# Patient Record
Sex: FEMALE | Race: WHITE | Employment: UNEMPLOYED | URBAN - METROPOLITAN AREA
[De-identification: names, ages, dates, MRNs, and addresses within clinical notes are randomized per-mention and may not be internally consistent; named-entity substitution may affect disease eponyms.]

---

## 2017-04-14 ENCOUNTER — ANESTHESIA EVENT (OUTPATIENT)
Dept: INTERVENTIONAL RADIOLOGY/VASCULAR | Age: 57
DRG: 004 | End: 2017-04-14
Payer: MEDICARE

## 2017-04-14 ENCOUNTER — ANESTHESIA EVENT (OUTPATIENT)
Dept: OPERATING ROOM | Age: 57
DRG: 004 | End: 2017-04-14
Payer: MEDICARE

## 2017-04-14 ENCOUNTER — APPOINTMENT (OUTPATIENT)
Dept: INTERVENTIONAL RADIOLOGY/VASCULAR | Age: 57
DRG: 004 | End: 2017-04-14
Attending: NEUROLOGICAL SURGERY
Payer: MEDICARE

## 2017-04-14 ENCOUNTER — HOSPITAL ENCOUNTER (INPATIENT)
Age: 57
LOS: 24 days | Discharge: NURSING, REHAB OR LONG-TERM CARE | DRG: 004 | End: 2017-05-08
Attending: NEUROLOGICAL SURGERY | Admitting: NEUROLOGICAL SURGERY
Payer: MEDICARE

## 2017-04-14 ENCOUNTER — APPOINTMENT (OUTPATIENT)
Dept: GENERAL RADIOLOGY | Age: 57
DRG: 004 | End: 2017-04-14
Attending: NEUROLOGICAL SURGERY
Payer: MEDICARE

## 2017-04-14 ENCOUNTER — APPOINTMENT (OUTPATIENT)
Dept: CT IMAGING | Age: 57
DRG: 004 | End: 2017-04-14
Attending: NEUROLOGICAL SURGERY
Payer: MEDICARE

## 2017-04-14 ENCOUNTER — ANESTHESIA (OUTPATIENT)
Dept: OPERATING ROOM | Age: 57
DRG: 004 | End: 2017-04-14
Payer: MEDICARE

## 2017-04-14 ENCOUNTER — ANESTHESIA (OUTPATIENT)
Dept: INTERVENTIONAL RADIOLOGY/VASCULAR | Age: 57
DRG: 004 | End: 2017-04-14
Payer: MEDICARE

## 2017-04-14 VITALS — OXYGEN SATURATION: 100 % | TEMPERATURE: 93.7 F | DIASTOLIC BLOOD PRESSURE: 62 MMHG | SYSTOLIC BLOOD PRESSURE: 100 MMHG

## 2017-04-14 DIAGNOSIS — I60.2: Primary | ICD-10-CM

## 2017-04-14 PROBLEM — I60.9 SAH (SUBARACHNOID HEMORRHAGE) (HCC): Status: ACTIVE | Noted: 2017-04-14

## 2017-04-14 PROBLEM — J96.00 ACUTE RESPIRATORY FAILURE WITHOUT HYPERCAPNIA (HCC): Status: ACTIVE | Noted: 2017-04-14

## 2017-04-14 LAB
ABSOLUTE EOS #: 0 K/UL (ref 0–0.4)
ABSOLUTE LYMPH #: 1.2 K/UL (ref 1–4.8)
ABSOLUTE MONO #: 1 K/UL (ref 0.1–1.2)
ANION GAP SERPL CALCULATED.3IONS-SCNC: 18 MMOL/L (ref 9–17)
BASOPHILS # BLD: 1 % (ref 0–2)
BASOPHILS ABSOLUTE: 0.1 K/UL (ref 0–0.2)
BUN BLDV-MCNC: 8 MG/DL (ref 6–20)
BUN/CREAT BLD: ABNORMAL (ref 9–20)
CALCIUM SERPL-MCNC: 7.8 MG/DL (ref 8.6–10.4)
CHLORIDE BLD-SCNC: 101 MMOL/L (ref 98–107)
CO2: 20 MMOL/L (ref 20–31)
COLLAGEN ADENOSINE-5'-DIPHOSPHATE (ADP) TIME: 63 SEC (ref 67–112)
COLLAGEN EPINEPHRINE TIME: 104 SEC (ref 85–172)
CREAT SERPL-MCNC: 0.4 MG/DL (ref 0.5–0.9)
DIFFERENTIAL TYPE: ABNORMAL
EOSINOPHILS RELATIVE PERCENT: 0 % (ref 1–4)
FIO2: 4
FIO2: 40
GFR AFRICAN AMERICAN: >60 ML/MIN
GFR NON-AFRICAN AMERICAN: >60 ML/MIN
GFR SERPL CREATININE-BSD FRML MDRD: ABNORMAL ML/MIN/{1.73_M2}
GFR SERPL CREATININE-BSD FRML MDRD: ABNORMAL ML/MIN/{1.73_M2}
GLUCOSE BLD-MCNC: 218 MG/DL (ref 70–99)
HCT VFR BLD CALC: 36.3 % (ref 36–46)
HCT VFR BLD CALC: 42.4 % (ref 36–46)
HEMOGLOBIN: 12.4 G/DL (ref 12–16)
HEMOGLOBIN: 14.2 G/DL (ref 12–16)
INR BLD: 1
LYMPHOCYTES # BLD: 7 % (ref 24–44)
MCH RBC QN AUTO: 31.8 PG (ref 26–34)
MCH RBC QN AUTO: 31.9 PG (ref 26–34)
MCHC RBC AUTO-ENTMCNC: 33.5 G/DL (ref 31–37)
MCHC RBC AUTO-ENTMCNC: 34.3 G/DL (ref 31–37)
MCV RBC AUTO: 92.9 FL (ref 80–100)
MCV RBC AUTO: 95.2 FL (ref 80–100)
MONOCYTES # BLD: 6 % (ref 2–11)
MRSA, DNA, NASAL: NORMAL
NEGATIVE BASE EXCESS, ART: 3 (ref 0–2)
NEGATIVE BASE EXCESS, ART: 4 (ref 0–2)
O2 DEVICE/FLOW/%: ABNORMAL
O2 DEVICE/FLOW/%: ABNORMAL
PARTIAL THROMBOPLASTIN TIME: 20.8 SEC (ref 21.3–31.3)
PATIENT TEMP: ABNORMAL
PATIENT TEMP: ABNORMAL
PDW BLD-RTO: 13.8 % (ref 12.5–15.4)
PDW BLD-RTO: 14.2 % (ref 12.5–15.4)
PLATELET # BLD: 227 K/UL (ref 140–450)
PLATELET # BLD: 317 K/UL (ref 140–450)
PLATELET ESTIMATE: ABNORMAL
PLATELET FUNCTION INTERP: ABNORMAL
PMV BLD AUTO: 7 FL (ref 6–12)
PMV BLD AUTO: 7.4 FL (ref 6–12)
POC HCO3: 22.7 MMOL/L (ref 22–27)
POC HCO3: 24.1 MMOL/L (ref 22–27)
POC O2 SATURATION: 96 %
POC O2 SATURATION: 97 %
POC PCO2 TEMP: ABNORMAL MM HG
POC PCO2 TEMP: ABNORMAL MM HG
POC PCO2: 46 MM HG (ref 32–45)
POC PCO2: 52 MM HG (ref 32–45)
POC PH TEMP: ABNORMAL
POC PH TEMP: ABNORMAL
POC PH: 7.28 (ref 7.35–7.45)
POC PH: 7.3 (ref 7.35–7.45)
POC PO2 TEMP: ABNORMAL MM HG
POC PO2 TEMP: ABNORMAL MM HG
POC PO2: 100 MM HG (ref 75–95)
POC PO2: 95 MM HG (ref 75–95)
POSITIVE BASE EXCESS, ART: ABNORMAL (ref 0–2)
POSITIVE BASE EXCESS, ART: ABNORMAL (ref 0–2)
POTASSIUM SERPL-SCNC: 3.7 MMOL/L (ref 3.7–5.3)
PROTHROMBIN TIME: 10.7 SEC (ref 9.4–12.6)
RBC # BLD: 3.91 M/UL (ref 4–5.2)
RBC # BLD: 4.45 M/UL (ref 4–5.2)
RBC # BLD: ABNORMAL 10*6/UL
SEG NEUTROPHILS: 86 % (ref 36–66)
SEGMENTED NEUTROPHILS ABSOLUTE COUNT: 15.2 K/UL (ref 1.8–7.7)
SODIUM BLD-SCNC: 139 MMOL/L (ref 135–144)
SPECIMEN DESCRIPTION: NORMAL
TCO2 (CALC), ART: 24 MM HG (ref 23–28)
TCO2 (CALC), ART: 26 MM HG (ref 23–28)
WBC # BLD: 10.2 K/UL (ref 3.5–11)
WBC # BLD: 17.5 K/UL (ref 3.5–11)
WBC # BLD: ABNORMAL 10*3/UL

## 2017-04-14 PROCEDURE — 82803 BLOOD GASES ANY COMBINATION: CPT

## 2017-04-14 PROCEDURE — 2500000003 HC RX 250 WO HCPCS: Performed by: EMERGENCY MEDICINE

## 2017-04-14 PROCEDURE — B41FYZZ FLUOROSCOPY OF RIGHT LOWER EXTREMITY ARTERIES USING OTHER CONTRAST: ICD-10-PCS | Performed by: PSYCHIATRY & NEUROLOGY

## 2017-04-14 PROCEDURE — 6360000002 HC RX W HCPCS: Performed by: EMERGENCY MEDICINE

## 2017-04-14 PROCEDURE — 94770 HC ETCO2 MONITOR DAILY: CPT

## 2017-04-14 PROCEDURE — 80048 BASIC METABOLIC PNL TOTAL CA: CPT

## 2017-04-14 PROCEDURE — 2580000003 HC RX 258: Performed by: EMERGENCY MEDICINE

## 2017-04-14 PROCEDURE — B314YZZ FLUOROSCOPY OF LEFT COMMON CAROTID ARTERY USING OTHER CONTRAST: ICD-10-PCS | Performed by: PSYCHIATRY & NEUROLOGY

## 2017-04-14 PROCEDURE — 2580000003 HC RX 258

## 2017-04-14 PROCEDURE — 36224 PLACE CATH CAROTD ART: CPT | Performed by: PSYCHIATRY & NEUROLOGY

## 2017-04-14 PROCEDURE — 75894 X-RAYS TRANSCATH THERAPY: CPT | Performed by: PSYCHIATRY & NEUROLOGY

## 2017-04-14 PROCEDURE — 2500000003 HC RX 250 WO HCPCS: Performed by: NURSE ANESTHETIST, CERTIFIED REGISTERED

## 2017-04-14 PROCEDURE — 61624 TCAT PERM OCCLS/EMBOLJ CNS: CPT | Performed by: PSYCHIATRY & NEUROLOGY

## 2017-04-14 PROCEDURE — 36415 COLL VENOUS BLD VENIPUNCTURE: CPT

## 2017-04-14 PROCEDURE — 6360000002 HC RX W HCPCS

## 2017-04-14 PROCEDURE — 6360000004 HC RX CONTRAST MEDICATION: Performed by: NEUROLOGICAL SURGERY

## 2017-04-14 PROCEDURE — 85025 COMPLETE CBC W/AUTO DIFF WBC: CPT

## 2017-04-14 PROCEDURE — 3700000001 HC ADD 15 MINUTES (ANESTHESIA)

## 2017-04-14 PROCEDURE — 94762 N-INVAS EAR/PLS OXIMTRY CONT: CPT

## 2017-04-14 PROCEDURE — C1887 CATHETER, GUIDING: HCPCS

## 2017-04-14 PROCEDURE — 85027 COMPLETE CBC AUTOMATED: CPT

## 2017-04-14 PROCEDURE — 83735 ASSAY OF MAGNESIUM: CPT

## 2017-04-14 PROCEDURE — 2500000003 HC RX 250 WO HCPCS

## 2017-04-14 PROCEDURE — 84100 ASSAY OF PHOSPHORUS: CPT

## 2017-04-14 PROCEDURE — B317YZZ FLUOROSCOPY OF LEFT INTERNAL CAROTID ARTERY USING OTHER CONTRAST: ICD-10-PCS | Performed by: PSYCHIATRY & NEUROLOGY

## 2017-04-14 PROCEDURE — 2000000003 HC NEURO ICU R&B

## 2017-04-14 PROCEDURE — 76377 3D RENDER W/INTRP POSTPROCES: CPT | Performed by: PSYCHIATRY & NEUROLOGY

## 2017-04-14 PROCEDURE — 80076 HEPATIC FUNCTION PANEL: CPT

## 2017-04-14 PROCEDURE — 6370000000 HC RX 637 (ALT 250 FOR IP): Performed by: EMERGENCY MEDICINE

## 2017-04-14 PROCEDURE — A4216 STERILE WATER/SALINE, 10 ML: HCPCS

## 2017-04-14 PROCEDURE — 5A1955Z RESPIRATORY VENTILATION, GREATER THAN 96 CONSECUTIVE HOURS: ICD-10-PCS | Performed by: INTERNAL MEDICINE

## 2017-04-14 PROCEDURE — 2580000003 HC RX 258: Performed by: PSYCHIATRY & NEUROLOGY

## 2017-04-14 PROCEDURE — 87086 URINE CULTURE/COLONY COUNT: CPT

## 2017-04-14 PROCEDURE — 85576 BLOOD PLATELET AGGREGATION: CPT

## 2017-04-14 PROCEDURE — 70498 CT ANGIOGRAPHY NECK: CPT

## 2017-04-14 PROCEDURE — 94002 VENT MGMT INPAT INIT DAY: CPT

## 2017-04-14 PROCEDURE — 85610 PROTHROMBIN TIME: CPT

## 2017-04-14 PROCEDURE — 99291 CRITICAL CARE FIRST HOUR: CPT | Performed by: PSYCHIATRY & NEUROLOGY

## 2017-04-14 PROCEDURE — 87641 MR-STAPH DNA AMP PROBE: CPT

## 2017-04-14 PROCEDURE — 75898 FOLLOW-UP ANGIOGRAPHY: CPT | Performed by: PSYCHIATRY & NEUROLOGY

## 2017-04-14 PROCEDURE — 03VG3DZ RESTRICTION OF INTRACRANIAL ARTERY WITH INTRALUMINAL DEVICE, PERCUTANEOUS APPROACH: ICD-10-PCS | Performed by: PSYCHIATRY & NEUROLOGY

## 2017-04-14 PROCEDURE — 3700000000 HC ANESTHESIA ATTENDED CARE

## 2017-04-14 PROCEDURE — 93005 ELECTROCARDIOGRAM TRACING: CPT

## 2017-04-14 PROCEDURE — 36228 PLACE CATH INTRACRANIAL ART: CPT | Performed by: PSYCHIATRY & NEUROLOGY

## 2017-04-14 PROCEDURE — 85730 THROMBOPLASTIN TIME PARTIAL: CPT

## 2017-04-14 PROCEDURE — 36600 WITHDRAWAL OF ARTERIAL BLOOD: CPT

## 2017-04-14 PROCEDURE — 70496 CT ANGIOGRAPHY HEAD: CPT

## 2017-04-14 PROCEDURE — 71010 XR CHEST PORTABLE: CPT

## 2017-04-14 PROCEDURE — 6360000002 HC RX W HCPCS: Performed by: NURSE ANESTHETIST, CERTIFIED REGISTERED

## 2017-04-14 RX ORDER — ACETAMINOPHEN 325 MG/1
650 TABLET ORAL EVERY 4 HOURS PRN
Status: DISCONTINUED | OUTPATIENT
Start: 2017-04-14 | End: 2017-05-02

## 2017-04-14 RX ORDER — SODIUM CHLORIDE 9 MG/ML
INJECTION, SOLUTION INTRAVENOUS CONTINUOUS
Status: DISCONTINUED | OUTPATIENT
Start: 2017-04-14 | End: 2017-04-15

## 2017-04-14 RX ORDER — ROCURONIUM BROMIDE 10 MG/ML
INJECTION, SOLUTION INTRAVENOUS PRN
Status: DISCONTINUED | OUTPATIENT
Start: 2017-04-14 | End: 2017-04-14 | Stop reason: SDUPTHER

## 2017-04-14 RX ORDER — FENTANYL CITRATE 50 UG/ML
50 INJECTION, SOLUTION INTRAMUSCULAR; INTRAVENOUS
Status: DISCONTINUED | OUTPATIENT
Start: 2017-04-14 | End: 2017-04-30

## 2017-04-14 RX ORDER — CALCIUM CARBONATE 500(1250)
500 TABLET ORAL DAILY
COMMUNITY
End: 2017-06-01 | Stop reason: ALTCHOICE

## 2017-04-14 RX ORDER — ONDANSETRON 2 MG/ML
4 INJECTION INTRAMUSCULAR; INTRAVENOUS EVERY 6 HOURS PRN
Status: DISCONTINUED | OUTPATIENT
Start: 2017-04-14 | End: 2017-05-08 | Stop reason: HOSPADM

## 2017-04-14 RX ORDER — PREDNISONE 1 MG/1
5 TABLET ORAL 2 TIMES DAILY
Status: ON HOLD | COMMUNITY
End: 2017-05-08 | Stop reason: HOSPADM

## 2017-04-14 RX ORDER — FENTANYL CITRATE 50 UG/ML
25 INJECTION, SOLUTION INTRAMUSCULAR; INTRAVENOUS
Status: DISCONTINUED | OUTPATIENT
Start: 2017-04-14 | End: 2017-04-30

## 2017-04-14 RX ORDER — IODIXANOL 320 MG/ML
200 INJECTION, SOLUTION INTRAVASCULAR
Status: COMPLETED | OUTPATIENT
Start: 2017-04-14 | End: 2017-04-14

## 2017-04-14 RX ORDER — FENTANYL CITRATE 50 UG/ML
INJECTION, SOLUTION INTRAMUSCULAR; INTRAVENOUS
Status: COMPLETED
Start: 2017-04-14 | End: 2017-04-14

## 2017-04-14 RX ORDER — FENTANYL CITRATE 50 UG/ML
INJECTION, SOLUTION INTRAMUSCULAR; INTRAVENOUS PRN
Status: DISCONTINUED | OUTPATIENT
Start: 2017-04-14 | End: 2017-04-14 | Stop reason: SDUPTHER

## 2017-04-14 RX ORDER — SODIUM CHLORIDE 9 MG/ML
INJECTION, SOLUTION INTRAVENOUS CONTINUOUS
Status: DISCONTINUED | OUTPATIENT
Start: 2017-04-14 | End: 2017-04-17

## 2017-04-14 RX ORDER — PROPOFOL 10 MG/ML
10 INJECTION, EMULSION INTRAVENOUS ONCE
Status: DISCONTINUED | OUTPATIENT
Start: 2017-04-14 | End: 2017-04-14

## 2017-04-14 RX ORDER — FERROUS SULFATE 325(65) MG
325 TABLET ORAL
COMMUNITY

## 2017-04-14 RX ORDER — PROPOFOL 10 MG/ML
10 INJECTION, EMULSION INTRAVENOUS
Status: DISCONTINUED | OUTPATIENT
Start: 2017-04-14 | End: 2017-04-17

## 2017-04-14 RX ORDER — FOLIC ACID 1 MG/1
1 TABLET ORAL DAILY
COMMUNITY
End: 2017-07-03 | Stop reason: ALTCHOICE

## 2017-04-14 RX ORDER — MELOXICAM 15 MG/1
15 TABLET ORAL DAILY
Status: ON HOLD | COMMUNITY
End: 2017-05-08 | Stop reason: HOSPADM

## 2017-04-14 RX ORDER — SODIUM CHLORIDE 0.9 % (FLUSH) 0.9 %
10 SYRINGE (ML) INJECTION PRN
Status: DISCONTINUED | OUTPATIENT
Start: 2017-04-14 | End: 2017-04-24

## 2017-04-14 RX ORDER — HEPARIN SODIUM 1000 [USP'U]/ML
INJECTION, SOLUTION INTRAVENOUS; SUBCUTANEOUS PRN
Status: DISCONTINUED | OUTPATIENT
Start: 2017-04-14 | End: 2017-04-14 | Stop reason: SDUPTHER

## 2017-04-14 RX ORDER — FAMOTIDINE 20 MG/1
20 TABLET, FILM COATED ORAL DAILY
Status: DISCONTINUED | OUTPATIENT
Start: 2017-04-15 | End: 2017-04-15

## 2017-04-14 RX ORDER — CLINDAMYCIN PHOSPHATE 150 MG/ML
INJECTION, SOLUTION INTRAVENOUS PRN
Status: DISCONTINUED | OUTPATIENT
Start: 2017-04-14 | End: 2017-04-14 | Stop reason: SDUPTHER

## 2017-04-14 RX ORDER — SODIUM CHLORIDE 0.9 % (FLUSH) 0.9 %
10 SYRINGE (ML) INJECTION EVERY 12 HOURS SCHEDULED
Status: DISCONTINUED | OUTPATIENT
Start: 2017-04-14 | End: 2017-05-08 | Stop reason: HOSPADM

## 2017-04-14 RX ORDER — PROMETHAZINE HYDROCHLORIDE 25 MG/ML
12.5 INJECTION, SOLUTION INTRAMUSCULAR; INTRAVENOUS ONCE
Status: COMPLETED | OUTPATIENT
Start: 2017-04-15 | End: 2017-04-14

## 2017-04-14 RX ORDER — ASCORBIC ACID 500 MG
500 TABLET ORAL DAILY
COMMUNITY
End: 2017-07-03 | Stop reason: ALTCHOICE

## 2017-04-14 RX ORDER — ALBUTEROL SULFATE 2.5 MG/3ML
2.5 SOLUTION RESPIRATORY (INHALATION) EVERY 4 HOURS PRN
Status: DISCONTINUED | OUTPATIENT
Start: 2017-04-14 | End: 2017-04-15

## 2017-04-14 RX ORDER — CHLORAL HYDRATE 500 MG
1000 CAPSULE ORAL DAILY
COMMUNITY
End: 2017-06-01 | Stop reason: ALTCHOICE

## 2017-04-14 RX ORDER — LEFLUNOMIDE 20 MG/1
20 TABLET ORAL DAILY
Status: ON HOLD | COMMUNITY
End: 2017-05-08 | Stop reason: HOSPADM

## 2017-04-14 RX ORDER — DIPHENHYDRAMINE HYDROCHLORIDE 50 MG/ML
25 INJECTION INTRAMUSCULAR; INTRAVENOUS ONCE
Status: DISCONTINUED | OUTPATIENT
Start: 2017-04-14 | End: 2017-04-14

## 2017-04-14 RX ORDER — SIMVASTATIN 40 MG
80 TABLET ORAL NIGHTLY
Status: COMPLETED | OUTPATIENT
Start: 2017-04-14 | End: 2017-05-05

## 2017-04-14 RX ORDER — CHLORHEXIDINE GLUCONATE 0.12 MG/ML
15 RINSE ORAL 2 TIMES DAILY
Status: DISCONTINUED | OUTPATIENT
Start: 2017-04-14 | End: 2017-04-14

## 2017-04-14 RX ORDER — PROPOFOL 10 MG/ML
INJECTION, EMULSION INTRAVENOUS
Status: COMPLETED
Start: 2017-04-14 | End: 2017-04-14

## 2017-04-14 RX ORDER — M-VIT,TX,IRON,MINS/CALC/FOLIC 27MG-0.4MG
1 TABLET ORAL DAILY
COMMUNITY

## 2017-04-14 RX ADMIN — FENTANYL CITRATE 50 MCG: 50 INJECTION, SOLUTION INTRAMUSCULAR; INTRAVENOUS at 23:53

## 2017-04-14 RX ADMIN — SODIUM CHLORIDE: 9 INJECTION, SOLUTION INTRAVENOUS at 19:54

## 2017-04-14 RX ADMIN — SODIUM CHLORIDE: 9 INJECTION, SOLUTION INTRAVENOUS at 16:51

## 2017-04-14 RX ADMIN — NIMODIPINE 60 MG: 60 SOLUTION ORAL at 14:43

## 2017-04-14 RX ADMIN — NIMODIPINE 60 MG: 60 SOLUTION ORAL at 19:55

## 2017-04-14 RX ADMIN — HEPARIN SODIUM 2000 UNITS: 1000 INJECTION, SOLUTION INTRAVENOUS; SUBCUTANEOUS at 17:15

## 2017-04-14 RX ADMIN — ROCURONIUM BROMIDE 30 MG: 10 INJECTION INTRAVENOUS at 16:50

## 2017-04-14 RX ADMIN — FENTANYL CITRATE 100 MCG: 50 INJECTION INTRAMUSCULAR; INTRAVENOUS at 17:26

## 2017-04-14 RX ADMIN — CLINDAMYCIN PHOSPHATE 900 MG: 150 INJECTION, SOLUTION INTRAMUSCULAR; INTRAVENOUS at 16:15

## 2017-04-14 RX ADMIN — SIMVASTATIN 80 MG: 40 TABLET, FILM COATED ORAL at 20:57

## 2017-04-14 RX ADMIN — NIMODIPINE 60 MG: 60 SOLUTION ORAL at 20:57

## 2017-04-14 RX ADMIN — LEVETIRACETAM 1000 MG: 500 INJECTION, SOLUTION, CONCENTRATE INTRAVENOUS at 21:05

## 2017-04-14 RX ADMIN — PROPOFOL 30 MCG/KG/MIN: 10 INJECTION, EMULSION INTRAVENOUS at 14:44

## 2017-04-14 RX ADMIN — FENTANYL CITRATE 50 MCG: 50 INJECTION INTRAMUSCULAR; INTRAVENOUS at 23:53

## 2017-04-14 RX ADMIN — ROCURONIUM BROMIDE 50 MG: 10 INJECTION INTRAVENOUS at 15:50

## 2017-04-14 RX ADMIN — IOVERSOL 90 ML: 741 INJECTION INTRA-ARTERIAL; INTRAVENOUS at 14:10

## 2017-04-14 RX ADMIN — PROPOFOL 50 MCG/KG/MIN: 10 INJECTION, EMULSION INTRAVENOUS at 20:32

## 2017-04-14 RX ADMIN — PROMETHAZINE HYDROCHLORIDE 12.5 MG: 25 INJECTION, SOLUTION INTRAMUSCULAR; INTRAVENOUS at 23:59

## 2017-04-14 RX ADMIN — ROCURONIUM BROMIDE 20 MG: 10 INJECTION INTRAVENOUS at 17:52

## 2017-04-14 RX ADMIN — IODIXANOL 135 ML: 320 INJECTION, SOLUTION INTRAVASCULAR at 18:31

## 2017-04-14 RX ADMIN — SODIUM CHLORIDE: 9 INJECTION, SOLUTION INTRAVENOUS at 14:45

## 2017-04-14 RX ADMIN — NICARDIPINE HYDROCHLORIDE 7.5 MG/HR: 0.1 INJECTION, SOLUTION INTRAVENOUS at 14:00

## 2017-04-14 ASSESSMENT — PULMONARY FUNCTION TESTS
PIF_VALUE: 17
PIF_VALUE: 21
PIF_VALUE: 13
PIF_VALUE: 10
PIF_VALUE: 12
PIF_VALUE: 17
PIF_VALUE: 19

## 2017-04-14 ASSESSMENT — PAIN DESCRIPTION - LOCATION: LOCATION: OTHER (COMMENT)

## 2017-04-14 ASSESSMENT — PAIN SCALES - WONG BAKER
WONGBAKER_NUMERICALRESPONSE: 2
WONGBAKER_NUMERICALRESPONSE: 8

## 2017-04-15 ENCOUNTER — APPOINTMENT (OUTPATIENT)
Dept: CT IMAGING | Age: 57
DRG: 004 | End: 2017-04-15
Attending: NEUROLOGICAL SURGERY
Payer: MEDICARE

## 2017-04-15 LAB
ALBUMIN SERPL-MCNC: 3.7 G/DL (ref 3.5–5.2)
ALBUMIN/GLOBULIN RATIO: 1.4 (ref 1–2.5)
ALP BLD-CCNC: 87 U/L (ref 35–104)
ALT SERPL-CCNC: 29 U/L (ref 5–33)
ANION GAP SERPL CALCULATED.3IONS-SCNC: 14 MMOL/L (ref 9–17)
AST SERPL-CCNC: 44 U/L
BILIRUB SERPL-MCNC: 0.66 MG/DL (ref 0.3–1.2)
BILIRUBIN DIRECT: 0.15 MG/DL
BILIRUBIN, INDIRECT: 0.51 MG/DL (ref 0–1)
BUN BLDV-MCNC: 5 MG/DL (ref 6–20)
BUN/CREAT BLD: ABNORMAL (ref 9–20)
CALCIUM IONIZED: 1.05 MMOL/L (ref 1.13–1.33)
CALCIUM SERPL-MCNC: 7.8 MG/DL (ref 8.6–10.4)
CHLORIDE BLD-SCNC: 103 MMOL/L (ref 98–107)
CO2: 22 MMOL/L (ref 20–31)
CREAT SERPL-MCNC: 0.38 MG/DL (ref 0.5–0.9)
CULTURE: NO GROWTH
CULTURE: NORMAL
FIO2: 30
GFR AFRICAN AMERICAN: >60 ML/MIN
GFR NON-AFRICAN AMERICAN: >60 ML/MIN
GFR SERPL CREATININE-BSD FRML MDRD: ABNORMAL ML/MIN/{1.73_M2}
GFR SERPL CREATININE-BSD FRML MDRD: ABNORMAL ML/MIN/{1.73_M2}
GLOBULIN: ABNORMAL G/DL (ref 1.5–3.8)
GLUCOSE BLD-MCNC: 134 MG/DL (ref 70–99)
Lab: NORMAL
MAGNESIUM: 1.4 MG/DL (ref 1.6–2.6)
MAGNESIUM: 2 MG/DL (ref 1.6–2.6)
NEGATIVE BASE EXCESS, ART: ABNORMAL (ref 0–2)
O2 DEVICE/FLOW/%: ABNORMAL
PATIENT TEMP: ABNORMAL
PHOSPHORUS: 2.7 MG/DL (ref 2.6–4.5)
POC HCO3: 24.6 MMOL/L (ref 22–27)
POC O2 SATURATION: 98 %
POC PCO2 TEMP: ABNORMAL MM HG
POC PCO2: 38 MM HG (ref 32–45)
POC PH TEMP: ABNORMAL
POC PH: 7.43 (ref 7.35–7.45)
POC PO2 TEMP: ABNORMAL MM HG
POC PO2: 107 MM HG (ref 75–95)
POSITIVE BASE EXCESS, ART: 0 (ref 0–2)
POTASSIUM SERPL-SCNC: 3.6 MMOL/L (ref 3.7–5.3)
SODIUM BLD-SCNC: 139 MMOL/L (ref 135–144)
SPECIMEN DESCRIPTION: NORMAL
STATUS: NORMAL
TCO2 (CALC), ART: 26 MM HG (ref 23–28)
TOTAL PROTEIN: 6.4 G/DL (ref 6.4–8.3)

## 2017-04-15 PROCEDURE — 94762 N-INVAS EAR/PLS OXIMTRY CONT: CPT

## 2017-04-15 PROCEDURE — 94003 VENT MGMT INPAT SUBQ DAY: CPT

## 2017-04-15 PROCEDURE — 93306 TTE W/DOPPLER COMPLETE: CPT

## 2017-04-15 PROCEDURE — 94770 HC ETCO2 MONITOR DAILY: CPT

## 2017-04-15 PROCEDURE — 99024 POSTOP FOLLOW-UP VISIT: CPT | Performed by: PSYCHIATRY & NEUROLOGY

## 2017-04-15 PROCEDURE — 82803 BLOOD GASES ANY COMBINATION: CPT

## 2017-04-15 PROCEDURE — 82330 ASSAY OF CALCIUM: CPT

## 2017-04-15 PROCEDURE — 70450 CT HEAD/BRAIN W/O DYE: CPT

## 2017-04-15 PROCEDURE — S0028 INJECTION, FAMOTIDINE, 20 MG: HCPCS | Performed by: EMERGENCY MEDICINE

## 2017-04-15 PROCEDURE — 2580000003 HC RX 258: Performed by: PSYCHIATRY & NEUROLOGY

## 2017-04-15 PROCEDURE — 99253 IP/OBS CNSLTJ NEW/EST LOW 45: CPT

## 2017-04-15 PROCEDURE — 6370000000 HC RX 637 (ALT 250 FOR IP): Performed by: EMERGENCY MEDICINE

## 2017-04-15 PROCEDURE — 99221 1ST HOSP IP/OBS SF/LOW 40: CPT | Performed by: NEUROLOGICAL SURGERY

## 2017-04-15 PROCEDURE — 99291 CRITICAL CARE FIRST HOUR: CPT | Performed by: INTERNAL MEDICINE

## 2017-04-15 PROCEDURE — 2580000003 HC RX 258: Performed by: EMERGENCY MEDICINE

## 2017-04-15 PROCEDURE — 93886 INTRACRANIAL COMPLETE STUDY: CPT

## 2017-04-15 PROCEDURE — 6360000002 HC RX W HCPCS: Performed by: EMERGENCY MEDICINE

## 2017-04-15 PROCEDURE — 83735 ASSAY OF MAGNESIUM: CPT

## 2017-04-15 PROCEDURE — 2500000003 HC RX 250 WO HCPCS: Performed by: EMERGENCY MEDICINE

## 2017-04-15 PROCEDURE — 2000000003 HC NEURO ICU R&B

## 2017-04-15 RX ORDER — MAGNESIUM SULFATE 1 G/100ML
1 INJECTION INTRAVENOUS PRN
Status: DISCONTINUED | OUTPATIENT
Start: 2017-04-15 | End: 2017-04-26

## 2017-04-15 RX ORDER — POTASSIUM CHLORIDE 20 MEQ/1
40 TABLET, EXTENDED RELEASE ORAL PRN
Status: DISCONTINUED | OUTPATIENT
Start: 2017-04-15 | End: 2017-05-08 | Stop reason: HOSPADM

## 2017-04-15 RX ORDER — POTASSIUM CHLORIDE 20MEQ/15ML
40 LIQUID (ML) ORAL PRN
Status: DISCONTINUED | OUTPATIENT
Start: 2017-04-15 | End: 2017-05-08 | Stop reason: HOSPADM

## 2017-04-15 RX ORDER — MAGNESIUM SULFATE 1 G/100ML
1 INJECTION INTRAVENOUS ONCE
Status: COMPLETED | OUTPATIENT
Start: 2017-04-15 | End: 2017-04-15

## 2017-04-15 RX ORDER — POTASSIUM CHLORIDE 7.45 MG/ML
10 INJECTION INTRAVENOUS PRN
Status: DISCONTINUED | OUTPATIENT
Start: 2017-04-15 | End: 2017-05-08 | Stop reason: HOSPADM

## 2017-04-15 RX ADMIN — FAMOTIDINE 20 MG: 10 INJECTION, SOLUTION INTRAVENOUS at 09:03

## 2017-04-15 RX ADMIN — NIMODIPINE 60 MG: 60 SOLUTION ORAL at 20:16

## 2017-04-15 RX ADMIN — LEVETIRACETAM 1000 MG: 500 INJECTION, SOLUTION, CONCENTRATE INTRAVENOUS at 09:03

## 2017-04-15 RX ADMIN — PROPOFOL 50 MCG/KG/MIN: 10 INJECTION, EMULSION INTRAVENOUS at 06:40

## 2017-04-15 RX ADMIN — NIMODIPINE 60 MG: 60 SOLUTION ORAL at 09:03

## 2017-04-15 RX ADMIN — ONDANSETRON 4 MG: 2 INJECTION, SOLUTION INTRAMUSCULAR; INTRAVENOUS at 09:19

## 2017-04-15 RX ADMIN — FENTANYL CITRATE 50 MCG: 50 INJECTION INTRAMUSCULAR; INTRAVENOUS at 19:51

## 2017-04-15 RX ADMIN — MAGNESIUM SULFATE IN DEXTROSE 1 G: 10 INJECTION, SOLUTION INTRAVENOUS at 03:45

## 2017-04-15 RX ADMIN — FENTANYL CITRATE 50 MCG: 50 INJECTION INTRAMUSCULAR; INTRAVENOUS at 06:07

## 2017-04-15 RX ADMIN — NIMODIPINE 60 MG: 60 SOLUTION ORAL at 16:40

## 2017-04-15 RX ADMIN — PROPOFOL 50 MCG/KG/MIN: 10 INJECTION, EMULSION INTRAVENOUS at 00:53

## 2017-04-15 RX ADMIN — ONDANSETRON 4 MG: 2 INJECTION, SOLUTION INTRAMUSCULAR; INTRAVENOUS at 03:24

## 2017-04-15 RX ADMIN — NIMODIPINE 60 MG: 60 SOLUTION ORAL at 04:44

## 2017-04-15 RX ADMIN — ONDANSETRON 4 MG: 2 INJECTION, SOLUTION INTRAMUSCULAR; INTRAVENOUS at 16:40

## 2017-04-15 RX ADMIN — NIMODIPINE 60 MG: 60 SOLUTION ORAL at 00:51

## 2017-04-15 RX ADMIN — CALCIUM GLUCONATE 1 G: 94 INJECTION, SOLUTION INTRAVENOUS at 20:47

## 2017-04-15 RX ADMIN — NIMODIPINE 60 MG: 60 SOLUTION ORAL at 11:53

## 2017-04-15 RX ADMIN — LEVETIRACETAM 1000 MG: 500 INJECTION, SOLUTION, CONCENTRATE INTRAVENOUS at 20:16

## 2017-04-15 RX ADMIN — FAMOTIDINE 20 MG: 10 INJECTION, SOLUTION INTRAVENOUS at 20:26

## 2017-04-15 RX ADMIN — SIMVASTATIN 80 MG: 40 TABLET, FILM COATED ORAL at 20:16

## 2017-04-15 RX ADMIN — PROPOFOL 50 MCG/KG/MIN: 10 INJECTION, EMULSION INTRAVENOUS at 16:40

## 2017-04-15 RX ADMIN — SODIUM CHLORIDE: 9 INJECTION, SOLUTION INTRAVENOUS at 00:53

## 2017-04-15 RX ADMIN — Medication 10 ML: at 20:16

## 2017-04-15 ASSESSMENT — PULMONARY FUNCTION TESTS
PIF_VALUE: 17
PIF_VALUE: 16
PIF_VALUE: 11
PIF_VALUE: 13
PIF_VALUE: 7
PIF_VALUE: 17
PIF_VALUE: 21
PIF_VALUE: 16

## 2017-04-15 ASSESSMENT — PAIN SCALES - WONG BAKER: WONGBAKER_NUMERICALRESPONSE: 0

## 2017-04-15 ASSESSMENT — PAIN DESCRIPTION - DESCRIPTORS: DESCRIPTORS: OTHER (COMMENT)

## 2017-04-16 ENCOUNTER — ANESTHESIA (OUTPATIENT)
Dept: OPERATING ROOM | Age: 57
DRG: 004 | End: 2017-04-16
Payer: MEDICARE

## 2017-04-16 ENCOUNTER — APPOINTMENT (OUTPATIENT)
Dept: GENERAL RADIOLOGY | Age: 57
DRG: 004 | End: 2017-04-16
Attending: NEUROLOGICAL SURGERY
Payer: MEDICARE

## 2017-04-16 ENCOUNTER — ANESTHESIA EVENT (OUTPATIENT)
Dept: OPERATING ROOM | Age: 57
DRG: 004 | End: 2017-04-16
Payer: MEDICARE

## 2017-04-16 ENCOUNTER — APPOINTMENT (OUTPATIENT)
Dept: CT IMAGING | Age: 57
DRG: 004 | End: 2017-04-16
Attending: NEUROLOGICAL SURGERY
Payer: MEDICARE

## 2017-04-16 VITALS — SYSTOLIC BLOOD PRESSURE: 88 MMHG | TEMPERATURE: 98.2 F | OXYGEN SATURATION: 98 % | DIASTOLIC BLOOD PRESSURE: 38 MMHG

## 2017-04-16 LAB
ABSOLUTE EOS #: 0 K/UL (ref 0–0.4)
ABSOLUTE LYMPH #: 1 K/UL (ref 1–4.8)
ABSOLUTE MONO #: 1.2 K/UL (ref 0.1–1.2)
ANION GAP SERPL CALCULATED.3IONS-SCNC: 14 MMOL/L (ref 9–17)
APPEARANCE CSF: ABNORMAL
APPEARANCE CSF: ABNORMAL
BASOPHILS # BLD: 0 % (ref 0–2)
BASOPHILS ABSOLUTE: 0 K/UL (ref 0–0.2)
BUN BLDV-MCNC: 4 MG/DL (ref 6–20)
BUN/CREAT BLD: ABNORMAL (ref 9–20)
CALCIUM IONIZED: 1.16 MMOL/L (ref 1.13–1.33)
CALCIUM SERPL-MCNC: 8.5 MG/DL (ref 8.6–10.4)
CHLORIDE BLD-SCNC: 107 MMOL/L (ref 98–107)
CO2: 25 MMOL/L (ref 20–31)
CREAT SERPL-MCNC: 0.37 MG/DL (ref 0.5–0.9)
CULTURE: NORMAL
DIFFERENTIAL TYPE: ABNORMAL
DIRECT EXAM: NORMAL
EOSINOPHILS RELATIVE PERCENT: 0 % (ref 1–4)
FIO2: 30
GFR AFRICAN AMERICAN: >60 ML/MIN
GFR NON-AFRICAN AMERICAN: >60 ML/MIN
GFR SERPL CREATININE-BSD FRML MDRD: ABNORMAL ML/MIN/{1.73_M2}
GFR SERPL CREATININE-BSD FRML MDRD: ABNORMAL ML/MIN/{1.73_M2}
GLUCOSE BLD-MCNC: 113 MG/DL (ref 70–99)
GLUCOSE, CSF: 75 MG/DL (ref 40–70)
GLUCOSE, CSF: 86 MG/DL (ref 40–70)
HCT VFR BLD CALC: 34.7 % (ref 36–46)
HEMOGLOBIN: 11.7 G/DL (ref 12–16)
LYMPHOCYTES # BLD: 10 % (ref 24–44)
Lab: NORMAL
Lab: NORMAL
MAGNESIUM: 2 MG/DL (ref 1.6–2.6)
MCH RBC QN AUTO: 32 PG (ref 26–34)
MCHC RBC AUTO-ENTMCNC: 33.8 G/DL (ref 31–37)
MCV RBC AUTO: 94.4 FL (ref 80–100)
MONOCYTES # BLD: 12 % (ref 2–11)
NEGATIVE BASE EXCESS, ART: ABNORMAL (ref 0–2)
O2 DEVICE/FLOW/%: ABNORMAL
PATIENT TEMP: 99.1
PDW BLD-RTO: 13.8 % (ref 12.5–15.4)
PLATELET # BLD: 204 K/UL (ref 140–450)
PLATELET ESTIMATE: ABNORMAL
PMV BLD AUTO: 7.5 FL (ref 6–12)
POC HCO3: 27.1 MMOL/L (ref 22–27)
POC O2 SATURATION: 97 %
POC PCO2 TEMP: ABNORMAL MM HG
POC PCO2: 47 MM HG (ref 32–45)
POC PH TEMP: ABNORMAL
POC PH: 7.37 (ref 7.35–7.45)
POC PO2 TEMP: ABNORMAL MM HG
POC PO2: 99 MM HG (ref 75–95)
POSITIVE BASE EXCESS, ART: 2 (ref 0–2)
POTASSIUM SERPL-SCNC: 3.4 MMOL/L (ref 3.7–5.3)
PROTEIN CSF: 25.3 MG/DL (ref 15–45)
PROTEIN CSF: 39.8 MG/DL (ref 15–45)
RBC # BLD: 3.67 M/UL (ref 4–5.2)
RBC # BLD: ABNORMAL 10*6/UL
RBC CSF: ABNORMAL /MM3
RBC CSF: ABNORMAL /MM3
SEG NEUTROPHILS: 78 % (ref 36–66)
SEGMENTED NEUTROPHILS ABSOLUTE COUNT: 8 K/UL (ref 1.8–7.7)
SODIUM BLD-SCNC: 146 MMOL/L (ref 135–144)
SPECIMEN DESCRIPTION: NORMAL
SPECIMEN DESCRIPTION: NORMAL
STATUS: NORMAL
STATUS: NORMAL
SUPERNAT COLOR CSF: ABNORMAL
SUPERNAT COLOR CSF: ABNORMAL
TCO2 (CALC), ART: 29 MM HG (ref 23–28)
TUBE NUMBER CSF: 1
TUBE NUMBER CSF: ABNORMAL
VOLUME CSF: 1
VOLUME CSF: 3.5
WBC # BLD: 10.2 K/UL (ref 3.5–11)
WBC # BLD: ABNORMAL 10*3/UL
WBC CSF: 2 /MM3
WBC CSF: 590 /MM3
XANTHOCHROMIA: ABNORMAL
XANTHOCHROMIA: PRESENT

## 2017-04-16 PROCEDURE — 87070 CULTURE OTHR SPECIMN AEROBIC: CPT

## 2017-04-16 PROCEDURE — S0028 INJECTION, FAMOTIDINE, 20 MG: HCPCS | Performed by: EMERGENCY MEDICINE

## 2017-04-16 PROCEDURE — 2580000003 HC RX 258: Performed by: NEUROLOGICAL SURGERY

## 2017-04-16 PROCEDURE — 84157 ASSAY OF PROTEIN OTHER: CPT

## 2017-04-16 PROCEDURE — C1729 CATH, DRAINAGE: HCPCS | Performed by: NEUROLOGICAL SURGERY

## 2017-04-16 PROCEDURE — 2500000003 HC RX 250 WO HCPCS: Performed by: NEUROLOGICAL SURGERY

## 2017-04-16 PROCEDURE — 36600 WITHDRAWAL OF ARTERIAL BLOOD: CPT

## 2017-04-16 PROCEDURE — 6370000000 HC RX 637 (ALT 250 FOR IP): Performed by: NEUROLOGICAL SURGERY

## 2017-04-16 PROCEDURE — 80048 BASIC METABOLIC PNL TOTAL CA: CPT

## 2017-04-16 PROCEDURE — 87205 SMEAR GRAM STAIN: CPT

## 2017-04-16 PROCEDURE — 87075 CULTR BACTERIA EXCEPT BLOOD: CPT

## 2017-04-16 PROCEDURE — 6370000000 HC RX 637 (ALT 250 FOR IP): Performed by: EMERGENCY MEDICINE

## 2017-04-16 PROCEDURE — 2580000003 HC RX 258: Performed by: PSYCHIATRY & NEUROLOGY

## 2017-04-16 PROCEDURE — 82945 GLUCOSE OTHER FLUID: CPT

## 2017-04-16 PROCEDURE — 6360000002 HC RX W HCPCS: Performed by: EMERGENCY MEDICINE

## 2017-04-16 PROCEDURE — 99231 SBSQ HOSP IP/OBS SF/LOW 25: CPT

## 2017-04-16 PROCEDURE — 70450 CT HEAD/BRAIN W/O DYE: CPT

## 2017-04-16 PROCEDURE — 82803 BLOOD GASES ANY COMBINATION: CPT

## 2017-04-16 PROCEDURE — 2580000003 HC RX 258: Performed by: EMERGENCY MEDICINE

## 2017-04-16 PROCEDURE — 89051 BODY FLUID CELL COUNT: CPT

## 2017-04-16 PROCEDURE — 93886 INTRACRANIAL COMPLETE STUDY: CPT

## 2017-04-16 PROCEDURE — 95822 EEG COMA OR SLEEP ONLY: CPT

## 2017-04-16 PROCEDURE — 83735 ASSAY OF MAGNESIUM: CPT

## 2017-04-16 PROCEDURE — 94770 HC ETCO2 MONITOR DAILY: CPT

## 2017-04-16 PROCEDURE — 3700000001 HC ADD 15 MINUTES (ANESTHESIA): Performed by: NEUROLOGICAL SURGERY

## 2017-04-16 PROCEDURE — 71010 XR CHEST PORTABLE: CPT

## 2017-04-16 PROCEDURE — 82330 ASSAY OF CALCIUM: CPT

## 2017-04-16 PROCEDURE — 95819 EEG AWAKE AND ASLEEP: CPT

## 2017-04-16 PROCEDURE — 61210 BURR HOLE IMPLT VENTR CATH: CPT | Performed by: NEUROLOGICAL SURGERY

## 2017-04-16 PROCEDURE — 85025 COMPLETE CBC W/AUTO DIFF WBC: CPT

## 2017-04-16 PROCEDURE — 87015 SPECIMEN INFECT AGNT CONCNTJ: CPT

## 2017-04-16 PROCEDURE — 89050 BODY FLUID CELL COUNT: CPT

## 2017-04-16 PROCEDURE — 2000000003 HC NEURO ICU R&B

## 2017-04-16 PROCEDURE — 2500000003 HC RX 250 WO HCPCS: Performed by: EMERGENCY MEDICINE

## 2017-04-16 PROCEDURE — 6360000002 HC RX W HCPCS: Performed by: ANESTHESIOLOGY

## 2017-04-16 PROCEDURE — 6360000002 HC RX W HCPCS: Performed by: NURSE ANESTHETIST, CERTIFIED REGISTERED

## 2017-04-16 PROCEDURE — 94003 VENT MGMT INPAT SUBQ DAY: CPT

## 2017-04-16 PROCEDURE — 2580000003 HC RX 258: Performed by: NURSE ANESTHETIST, CERTIFIED REGISTERED

## 2017-04-16 PROCEDURE — 3600000003 HC SURGERY LEVEL 3 BASE: Performed by: NEUROLOGICAL SURGERY

## 2017-04-16 PROCEDURE — 99291 CRITICAL CARE FIRST HOUR: CPT | Performed by: INTERNAL MEDICINE

## 2017-04-16 PROCEDURE — 009600Z DRAINAGE OF CEREBRAL VENTRICLE WITH DRAINAGE DEVICE, OPEN APPROACH: ICD-10-PCS | Performed by: NEUROLOGICAL SURGERY

## 2017-04-16 PROCEDURE — 94762 N-INVAS EAR/PLS OXIMTRY CONT: CPT

## 2017-04-16 PROCEDURE — 36415 COLL VENOUS BLD VENIPUNCTURE: CPT

## 2017-04-16 PROCEDURE — 3700000000 HC ANESTHESIA ATTENDED CARE: Performed by: NEUROLOGICAL SURGERY

## 2017-04-16 PROCEDURE — 2500000003 HC RX 250 WO HCPCS: Performed by: NURSE ANESTHETIST, CERTIFIED REGISTERED

## 2017-04-16 PROCEDURE — 6360000002 HC RX W HCPCS: Performed by: NEUROLOGICAL SURGERY

## 2017-04-16 PROCEDURE — 3600000013 HC SURGERY LEVEL 3 ADDTL 15MIN: Performed by: NEUROLOGICAL SURGERY

## 2017-04-16 RX ORDER — OXYCODONE HYDROCHLORIDE AND ACETAMINOPHEN 5; 325 MG/1; MG/1
1 TABLET ORAL EVERY 4 HOURS PRN
Status: DISCONTINUED | OUTPATIENT
Start: 2017-04-16 | End: 2017-04-30

## 2017-04-16 RX ORDER — SODIUM CHLORIDE 0.9 % (FLUSH) 0.9 %
10 SYRINGE (ML) INJECTION PRN
Status: DISCONTINUED | OUTPATIENT
Start: 2017-04-16 | End: 2017-05-08 | Stop reason: HOSPADM

## 2017-04-16 RX ORDER — BISACODYL 10 MG
10 SUPPOSITORY, RECTAL RECTAL DAILY PRN
Status: DISCONTINUED | OUTPATIENT
Start: 2017-04-16 | End: 2017-05-08 | Stop reason: HOSPADM

## 2017-04-16 RX ORDER — SODIUM CHLORIDE 9 MG/ML
INJECTION, SOLUTION INTRAVENOUS CONTINUOUS PRN
Status: DISCONTINUED | OUTPATIENT
Start: 2017-04-16 | End: 2017-04-16 | Stop reason: SDUPTHER

## 2017-04-16 RX ORDER — ROCURONIUM BROMIDE 10 MG/ML
INJECTION, SOLUTION INTRAVENOUS PRN
Status: DISCONTINUED | OUTPATIENT
Start: 2017-04-16 | End: 2017-04-16 | Stop reason: SDUPTHER

## 2017-04-16 RX ORDER — LIDOCAINE HYDROCHLORIDE AND EPINEPHRINE 10; 10 MG/ML; UG/ML
INJECTION, SOLUTION INFILTRATION; PERINEURAL PRN
Status: DISCONTINUED | OUTPATIENT
Start: 2017-04-16 | End: 2017-04-16 | Stop reason: HOSPADM

## 2017-04-16 RX ORDER — ACETAMINOPHEN 325 MG/1
650 TABLET ORAL EVERY 4 HOURS PRN
Status: DISCONTINUED | OUTPATIENT
Start: 2017-04-16 | End: 2017-05-08 | Stop reason: HOSPADM

## 2017-04-16 RX ORDER — DOCUSATE SODIUM 100 MG/1
100 CAPSULE, LIQUID FILLED ORAL 2 TIMES DAILY
Status: DISCONTINUED | OUTPATIENT
Start: 2017-04-16 | End: 2017-04-16

## 2017-04-16 RX ORDER — SODIUM CHLORIDE 9 MG/ML
INJECTION, SOLUTION INTRAVENOUS CONTINUOUS
Status: DISCONTINUED | OUTPATIENT
Start: 2017-04-16 | End: 2017-04-17

## 2017-04-16 RX ORDER — SODIUM CHLORIDE 0.9 % (FLUSH) 0.9 %
10 SYRINGE (ML) INJECTION EVERY 12 HOURS SCHEDULED
Status: DISCONTINUED | OUTPATIENT
Start: 2017-04-16 | End: 2017-05-08 | Stop reason: HOSPADM

## 2017-04-16 RX ORDER — OXYCODONE HYDROCHLORIDE AND ACETAMINOPHEN 5; 325 MG/1; MG/1
2 TABLET ORAL EVERY 4 HOURS PRN
Status: DISCONTINUED | OUTPATIENT
Start: 2017-04-16 | End: 2017-04-30

## 2017-04-16 RX ORDER — CEFAZOLIN SODIUM 1 G/3ML
INJECTION, POWDER, FOR SOLUTION INTRAMUSCULAR; INTRAVENOUS PRN
Status: DISCONTINUED | OUTPATIENT
Start: 2017-04-16 | End: 2017-04-16 | Stop reason: SDUPTHER

## 2017-04-16 RX ORDER — GINSENG 100 MG
CAPSULE ORAL PRN
Status: DISCONTINUED | OUTPATIENT
Start: 2017-04-16 | End: 2017-04-16 | Stop reason: HOSPADM

## 2017-04-16 RX ORDER — FENTANYL CITRATE 50 UG/ML
INJECTION, SOLUTION INTRAMUSCULAR; INTRAVENOUS PRN
Status: DISCONTINUED | OUTPATIENT
Start: 2017-04-16 | End: 2017-04-16 | Stop reason: SDUPTHER

## 2017-04-16 RX ADMIN — NIMODIPINE 60 MG: 60 SOLUTION ORAL at 08:34

## 2017-04-16 RX ADMIN — PROPOFOL 50 MCG/KG/MIN: 10 INJECTION, EMULSION INTRAVENOUS at 23:54

## 2017-04-16 RX ADMIN — HYDROCORTISONE SODIUM SUCCINATE 100 MG: 100 INJECTION, POWDER, FOR SOLUTION INTRAMUSCULAR; INTRAVENOUS at 10:47

## 2017-04-16 RX ADMIN — LEVETIRACETAM 1000 MG: 500 INJECTION, SOLUTION, CONCENTRATE INTRAVENOUS at 08:14

## 2017-04-16 RX ADMIN — SODIUM CHLORIDE: 9 INJECTION, SOLUTION INTRAVENOUS at 00:23

## 2017-04-16 RX ADMIN — SODIUM CHLORIDE: 9 INJECTION, SOLUTION INTRAVENOUS at 08:05

## 2017-04-16 RX ADMIN — ROCURONIUM BROMIDE 40 MG: 10 INJECTION INTRAVENOUS at 10:19

## 2017-04-16 RX ADMIN — PROPOFOL 50 MCG/KG/MIN: 10 INJECTION, EMULSION INTRAVENOUS at 02:54

## 2017-04-16 RX ADMIN — FENTANYL CITRATE 50 MCG: 50 INJECTION INTRAMUSCULAR; INTRAVENOUS at 12:19

## 2017-04-16 RX ADMIN — HYDROMORPHONE HYDROCHLORIDE 0.5 MG: 1 INJECTION, SOLUTION INTRAMUSCULAR; INTRAVENOUS; SUBCUTANEOUS at 12:02

## 2017-04-16 RX ADMIN — FENTANYL CITRATE 50 MCG: 50 INJECTION INTRAMUSCULAR; INTRAVENOUS at 04:54

## 2017-04-16 RX ADMIN — SODIUM CHLORIDE: 9 INJECTION, SOLUTION INTRAVENOUS at 10:17

## 2017-04-16 RX ADMIN — Medication 10 ML: at 21:36

## 2017-04-16 RX ADMIN — NIMODIPINE 60 MG: 60 SOLUTION ORAL at 04:03

## 2017-04-16 RX ADMIN — FENTANYL CITRATE 100 MCG: 50 INJECTION INTRAMUSCULAR; INTRAVENOUS at 10:19

## 2017-04-16 RX ADMIN — PROPOFOL 50 MCG/KG/MIN: 10 INJECTION, EMULSION INTRAVENOUS at 07:24

## 2017-04-16 RX ADMIN — NIMODIPINE 60 MG: 60 SOLUTION ORAL at 16:22

## 2017-04-16 RX ADMIN — POTASSIUM CHLORIDE 40 MEQ: 40 SOLUTION ORAL at 16:22

## 2017-04-16 RX ADMIN — LEVETIRACETAM 1000 MG: 500 INJECTION, SOLUTION, CONCENTRATE INTRAVENOUS at 20:50

## 2017-04-16 RX ADMIN — CEFAZOLIN SODIUM 2000 MG: 1 INJECTION, POWDER, FOR SOLUTION INTRAMUSCULAR; INTRAVENOUS at 10:32

## 2017-04-16 RX ADMIN — SIMVASTATIN 80 MG: 40 TABLET, FILM COATED ORAL at 20:50

## 2017-04-16 RX ADMIN — NIMODIPINE 60 MG: 60 SOLUTION ORAL at 00:20

## 2017-04-16 RX ADMIN — NIMODIPINE 60 MG: 60 SOLUTION ORAL at 23:58

## 2017-04-16 RX ADMIN — NIMODIPINE 60 MG: 60 SOLUTION ORAL at 20:50

## 2017-04-16 RX ADMIN — FAMOTIDINE 20 MG: 10 INJECTION, SOLUTION INTRAVENOUS at 09:04

## 2017-04-16 RX ADMIN — Medication 2 G: at 18:47

## 2017-04-16 RX ADMIN — DOCUSATE SODIUM 100 MG: 50 LIQUID ORAL at 23:55

## 2017-04-16 RX ADMIN — PROPOFOL 50 MCG/KG/MIN: 10 INJECTION, EMULSION INTRAVENOUS at 18:57

## 2017-04-16 ASSESSMENT — PULMONARY FUNCTION TESTS
PIF_VALUE: 13
PIF_VALUE: 17
PIF_VALUE: 17
PIF_VALUE: 14
PIF_VALUE: 16
PIF_VALUE: 12
PIF_VALUE: 15
PIF_VALUE: 15
PIF_VALUE: 17

## 2017-04-16 ASSESSMENT — PAIN SCALES - WONG BAKER
WONGBAKER_NUMERICALRESPONSE: 4
WONGBAKER_NUMERICALRESPONSE: 4

## 2017-04-17 ENCOUNTER — APPOINTMENT (OUTPATIENT)
Dept: CT IMAGING | Age: 57
DRG: 004 | End: 2017-04-17
Attending: NEUROLOGICAL SURGERY
Payer: MEDICARE

## 2017-04-17 LAB
ABSOLUTE EOS #: 0 K/UL (ref 0–0.4)
ABSOLUTE LYMPH #: 1.4 K/UL (ref 1–4.8)
ABSOLUTE MONO #: 1.1 K/UL (ref 0.1–1.2)
ANION GAP SERPL CALCULATED.3IONS-SCNC: 10 MMOL/L (ref 9–17)
BANDS, CSF: NORMAL %
BASO CSF: NORMAL %
BASOPHILS # BLD: 0 % (ref 0–2)
BASOPHILS ABSOLUTE: 0 K/UL (ref 0–0.2)
BLAST CSF: NORMAL %
BUN BLDV-MCNC: 7 MG/DL (ref 6–20)
BUN/CREAT BLD: ABNORMAL (ref 9–20)
CALCIUM SERPL-MCNC: 7.9 MG/DL (ref 8.6–10.4)
CHLORIDE BLD-SCNC: 109 MMOL/L (ref 98–107)
CO2: 25 MMOL/L (ref 20–31)
CREAT SERPL-MCNC: 0.37 MG/DL (ref 0.5–0.9)
DIFFERENTIAL TYPE: ABNORMAL
EOS CSF: NORMAL %
EOSINOPHILS RELATIVE PERCENT: 1 % (ref 1–4)
FIO2: 30
FLUID DIFF COMMENT: NORMAL
GFR AFRICAN AMERICAN: >60 ML/MIN
GFR NON-AFRICAN AMERICAN: >60 ML/MIN
GFR SERPL CREATININE-BSD FRML MDRD: ABNORMAL ML/MIN/{1.73_M2}
GFR SERPL CREATININE-BSD FRML MDRD: ABNORMAL ML/MIN/{1.73_M2}
GLUCOSE BLD-MCNC: 95 MG/DL (ref 70–99)
HCT VFR BLD CALC: 33.5 % (ref 36–46)
HEMOGLOBIN: 11.1 G/DL (ref 12–16)
LYMPHOCYTES # BLD: 16 % (ref 24–44)
LYMPHS CSF: 56 %
MCH RBC QN AUTO: 31.5 PG (ref 26–34)
MCHC RBC AUTO-ENTMCNC: 33 G/DL (ref 31–37)
MCV RBC AUTO: 95.6 FL (ref 80–100)
METAYELO CSF: NORMAL %
MONO/MACROPHAGE CSF (MANUAL): NORMAL %
MONOCYTES # BLD: 12 % (ref 2–11)
MYELOCYTE CSF: NORMAL %
NEGATIVE BASE EXCESS, ART: ABNORMAL (ref 0–2)
NEUTROPHILS, CSF: 26 %
O2 DEVICE/FLOW/%: ABNORMAL
OTHER CELLS FLUID: NORMAL %
PATIENT TEMP: 37.3
PDW BLD-RTO: 14.4 % (ref 12.5–15.4)
PLATELET # BLD: 219 K/UL (ref 140–450)
PLATELET ESTIMATE: ABNORMAL
PMV BLD AUTO: 7.3 FL (ref 6–12)
POC HCO3: 28.2 MMOL/L (ref 22–27)
POC O2 SATURATION: 98 %
POC PCO2 TEMP: ABNORMAL MM HG
POC PCO2: 44 MM HG (ref 32–45)
POC PH TEMP: ABNORMAL
POC PH: 7.42 (ref 7.35–7.45)
POC PO2 TEMP: ABNORMAL MM HG
POC PO2: 101 MM HG (ref 75–95)
POSITIVE BASE EXCESS, ART: 4 (ref 0–2)
POTASSIUM SERPL-SCNC: 3.3 MMOL/L (ref 3.7–5.3)
RBC # BLD: 3.51 M/UL (ref 4–5.2)
RBC # BLD: ABNORMAL 10*6/UL
SEG NEUTROPHILS: 71 % (ref 36–66)
SEGMENTED NEUTROPHILS ABSOLUTE COUNT: 6.3 K/UL (ref 1.8–7.7)
SODIUM BLD-SCNC: 144 MMOL/L (ref 135–144)
TCO2 (CALC), ART: 29 MM HG (ref 23–28)
WBC # BLD: 8.8 K/UL (ref 3.5–11)
WBC # BLD: ABNORMAL 10*3/UL

## 2017-04-17 PROCEDURE — 6360000002 HC RX W HCPCS: Performed by: EMERGENCY MEDICINE

## 2017-04-17 PROCEDURE — 2580000003 HC RX 258: Performed by: EMERGENCY MEDICINE

## 2017-04-17 PROCEDURE — C1751 CATH, INF, PER/CENT/MIDLINE: HCPCS

## 2017-04-17 PROCEDURE — 6370000000 HC RX 637 (ALT 250 FOR IP): Performed by: EMERGENCY MEDICINE

## 2017-04-17 PROCEDURE — 97162 PT EVAL MOD COMPLEX 30 MIN: CPT

## 2017-04-17 PROCEDURE — 82803 BLOOD GASES ANY COMBINATION: CPT

## 2017-04-17 PROCEDURE — 85025 COMPLETE CBC W/AUTO DIFF WBC: CPT

## 2017-04-17 PROCEDURE — 2500000003 HC RX 250 WO HCPCS: Performed by: EMERGENCY MEDICINE

## 2017-04-17 PROCEDURE — 70496 CT ANGIOGRAPHY HEAD: CPT

## 2017-04-17 PROCEDURE — 97110 THERAPEUTIC EXERCISES: CPT

## 2017-04-17 PROCEDURE — 94762 N-INVAS EAR/PLS OXIMTRY CONT: CPT

## 2017-04-17 PROCEDURE — 0042T CT BRAIN PERFUSION: CPT

## 2017-04-17 PROCEDURE — 2000000003 HC NEURO ICU R&B

## 2017-04-17 PROCEDURE — 6370000000 HC RX 637 (ALT 250 FOR IP): Performed by: NEUROLOGICAL SURGERY

## 2017-04-17 PROCEDURE — 6360000004 HC RX CONTRAST MEDICATION: Performed by: NEUROLOGICAL SURGERY

## 2017-04-17 PROCEDURE — 70450 CT HEAD/BRAIN W/O DYE: CPT

## 2017-04-17 PROCEDURE — S0028 INJECTION, FAMOTIDINE, 20 MG: HCPCS | Performed by: EMERGENCY MEDICINE

## 2017-04-17 PROCEDURE — G8978 MOBILITY CURRENT STATUS: HCPCS

## 2017-04-17 PROCEDURE — 2580000003 HC RX 258: Performed by: NEUROLOGICAL SURGERY

## 2017-04-17 PROCEDURE — 6370000000 HC RX 637 (ALT 250 FOR IP): Performed by: PSYCHIATRY & NEUROLOGY

## 2017-04-17 PROCEDURE — 99233 SBSQ HOSP IP/OBS HIGH 50: CPT | Performed by: PSYCHIATRY & NEUROLOGY

## 2017-04-17 PROCEDURE — 36569 INSJ PICC 5 YR+ W/O IMAGING: CPT

## 2017-04-17 PROCEDURE — 36415 COLL VENOUS BLD VENIPUNCTURE: CPT

## 2017-04-17 PROCEDURE — G8979 MOBILITY GOAL STATUS: HCPCS

## 2017-04-17 PROCEDURE — 94003 VENT MGMT INPAT SUBQ DAY: CPT

## 2017-04-17 PROCEDURE — 76937 US GUIDE VASCULAR ACCESS: CPT

## 2017-04-17 PROCEDURE — 93886 INTRACRANIAL COMPLETE STUDY: CPT

## 2017-04-17 PROCEDURE — 6360000002 HC RX W HCPCS: Performed by: NEUROLOGICAL SURGERY

## 2017-04-17 PROCEDURE — 99291 CRITICAL CARE FIRST HOUR: CPT | Performed by: INTERNAL MEDICINE

## 2017-04-17 PROCEDURE — 6360000002 HC RX W HCPCS

## 2017-04-17 PROCEDURE — 80048 BASIC METABOLIC PNL TOTAL CA: CPT

## 2017-04-17 PROCEDURE — 94770 HC ETCO2 MONITOR DAILY: CPT

## 2017-04-17 PROCEDURE — 36620 INSERTION CATHETER ARTERY: CPT

## 2017-04-17 RX ORDER — MIDAZOLAM HYDROCHLORIDE 1 MG/ML
INJECTION INTRAMUSCULAR; INTRAVENOUS
Status: COMPLETED
Start: 2017-04-17 | End: 2017-04-17

## 2017-04-17 RX ORDER — 0.9 % SODIUM CHLORIDE 0.9 %
500 INTRAVENOUS SOLUTION INTRAVENOUS ONCE
Status: COMPLETED | OUTPATIENT
Start: 2017-04-17 | End: 2017-04-17

## 2017-04-17 RX ORDER — MIDAZOLAM HYDROCHLORIDE 1 MG/ML
2 INJECTION INTRAMUSCULAR; INTRAVENOUS ONCE
Status: COMPLETED | OUTPATIENT
Start: 2017-04-17 | End: 2017-04-17

## 2017-04-17 RX ORDER — ASPIRIN 81 MG/1
81 TABLET, CHEWABLE ORAL DAILY
Status: DISCONTINUED | OUTPATIENT
Start: 2017-04-17 | End: 2017-05-08 | Stop reason: HOSPADM

## 2017-04-17 RX ADMIN — FAMOTIDINE 20 MG: 10 INJECTION, SOLUTION INTRAVENOUS at 09:14

## 2017-04-17 RX ADMIN — PHENYLEPHRINE HYDROCHLORIDE 100 MCG/MIN: 10 INJECTION INTRAVENOUS at 15:07

## 2017-04-17 RX ADMIN — Medication 10 ML: at 09:26

## 2017-04-17 RX ADMIN — FENTANYL CITRATE 50 MCG: 50 INJECTION INTRAMUSCULAR; INTRAVENOUS at 21:35

## 2017-04-17 RX ADMIN — MAGNESIUM SULFATE HEPTAHYDRATE: 500 INJECTION, SOLUTION INTRAMUSCULAR; INTRAVENOUS at 15:06

## 2017-04-17 RX ADMIN — MIDAZOLAM HYDROCHLORIDE 2 MG: 1 INJECTION, SOLUTION INTRAMUSCULAR; INTRAVENOUS at 13:16

## 2017-04-17 RX ADMIN — SODIUM CHLORIDE 500 ML: 9 INJECTION, SOLUTION INTRAVENOUS at 12:14

## 2017-04-17 RX ADMIN — PROPOFOL 50 MCG/KG/MIN: 10 INJECTION, EMULSION INTRAVENOUS at 03:36

## 2017-04-17 RX ADMIN — NIMODIPINE 60 MG: 60 SOLUTION ORAL at 13:13

## 2017-04-17 RX ADMIN — NIMODIPINE 60 MG: 60 SOLUTION ORAL at 03:51

## 2017-04-17 RX ADMIN — FENTANYL CITRATE 50 MCG: 50 INJECTION INTRAMUSCULAR; INTRAVENOUS at 00:07

## 2017-04-17 RX ADMIN — SODIUM CHLORIDE: 9 INJECTION, SOLUTION INTRAVENOUS at 12:13

## 2017-04-17 RX ADMIN — Medication 10 ML: at 09:30

## 2017-04-17 RX ADMIN — HYDROMORPHONE HYDROCHLORIDE 0.5 MG: 1 INJECTION, SOLUTION INTRAMUSCULAR; INTRAVENOUS; SUBCUTANEOUS at 04:05

## 2017-04-17 RX ADMIN — ASPIRIN 81 MG: 81 TABLET, CHEWABLE ORAL at 22:33

## 2017-04-17 RX ADMIN — DOCUSATE SODIUM 100 MG: 50 LIQUID ORAL at 09:14

## 2017-04-17 RX ADMIN — MIDAZOLAM HYDROCHLORIDE 2 MG: 1 INJECTION INTRAMUSCULAR; INTRAVENOUS at 13:16

## 2017-04-17 RX ADMIN — IOVERSOL 40 ML: 741 INJECTION INTRA-ARTERIAL; INTRAVENOUS at 10:07

## 2017-04-17 RX ADMIN — IOVERSOL 90 ML: 741 INJECTION INTRA-ARTERIAL; INTRAVENOUS at 10:07

## 2017-04-17 RX ADMIN — NIMODIPINE 60 MG: 60 SOLUTION ORAL at 09:14

## 2017-04-17 RX ADMIN — SIMVASTATIN 80 MG: 40 TABLET, FILM COATED ORAL at 22:33

## 2017-04-17 RX ADMIN — FAMOTIDINE 20 MG: 10 INJECTION, SOLUTION INTRAVENOUS at 00:00

## 2017-04-17 RX ADMIN — MAGNESIUM SULFATE HEPTAHYDRATE: 500 INJECTION, SOLUTION INTRAMUSCULAR; INTRAVENOUS at 22:28

## 2017-04-17 RX ADMIN — NIMODIPINE 60 MG: 60 SOLUTION ORAL at 20:30

## 2017-04-17 RX ADMIN — NIMODIPINE 60 MG: 60 SOLUTION ORAL at 23:57

## 2017-04-17 RX ADMIN — NIMODIPINE 60 MG: 60 SOLUTION ORAL at 17:13

## 2017-04-17 RX ADMIN — LEVETIRACETAM 1000 MG: 500 INJECTION, SOLUTION, CONCENTRATE INTRAVENOUS at 21:24

## 2017-04-17 RX ADMIN — FAMOTIDINE 20 MG: 10 INJECTION, SOLUTION INTRAVENOUS at 22:33

## 2017-04-17 RX ADMIN — POTASSIUM CHLORIDE 40 MEQ: 40 SOLUTION ORAL at 13:14

## 2017-04-17 RX ADMIN — Medication 4 MG/HR: at 15:08

## 2017-04-17 RX ADMIN — LEVETIRACETAM 1000 MG: 500 INJECTION, SOLUTION, CONCENTRATE INTRAVENOUS at 09:14

## 2017-04-17 RX ADMIN — DOCUSATE SODIUM 100 MG: 50 LIQUID ORAL at 22:33

## 2017-04-17 RX ADMIN — Medication 10 ML: at 22:32

## 2017-04-17 RX ADMIN — Medication 2 G: at 02:18

## 2017-04-17 ASSESSMENT — PULMONARY FUNCTION TESTS
PIF_VALUE: 15
PIF_VALUE: 14
PIF_VALUE: 15
PIF_VALUE: 14
PIF_VALUE: 17
PIF_VALUE: 15
PIF_VALUE: 14
PIF_VALUE: 14
PIF_VALUE: 17
PIF_VALUE: 19
PIF_VALUE: 1
PIF_VALUE: 15
PIF_VALUE: 14

## 2017-04-18 ENCOUNTER — APPOINTMENT (OUTPATIENT)
Dept: CT IMAGING | Age: 57
DRG: 004 | End: 2017-04-18
Attending: NEUROLOGICAL SURGERY
Payer: MEDICARE

## 2017-04-18 LAB
ANION GAP SERPL CALCULATED.3IONS-SCNC: 11 MMOL/L (ref 9–17)
BUN BLDV-MCNC: 4 MG/DL (ref 6–20)
BUN/CREAT BLD: ABNORMAL (ref 9–20)
CALCIUM SERPL-MCNC: 7.7 MG/DL (ref 8.6–10.4)
CHLORIDE BLD-SCNC: 102 MMOL/L (ref 98–107)
CO2: 26 MMOL/L (ref 20–31)
CREAT SERPL-MCNC: 0.2 MG/DL (ref 0.5–0.9)
FIO2: 30
GFR AFRICAN AMERICAN: >60 ML/MIN
GFR NON-AFRICAN AMERICAN: >60 ML/MIN
GFR SERPL CREATININE-BSD FRML MDRD: ABNORMAL ML/MIN/{1.73_M2}
GFR SERPL CREATININE-BSD FRML MDRD: ABNORMAL ML/MIN/{1.73_M2}
GLUCOSE BLD-MCNC: 101 MG/DL (ref 70–99)
MAGNESIUM: 2.5 MG/DL (ref 1.6–2.6)
NEGATIVE BASE EXCESS, ART: ABNORMAL (ref 0–2)
O2 DEVICE/FLOW/%: ABNORMAL
PATIENT TEMP: 37.4
POC HCO3: 31.3 MMOL/L (ref 22–27)
POC O2 SATURATION: 99 %
POC PCO2 TEMP: ABNORMAL MM HG
POC PCO2: 41 MM HG (ref 32–45)
POC PH TEMP: ABNORMAL
POC PH: 7.49 (ref 7.35–7.45)
POC PO2 TEMP: ABNORMAL MM HG
POC PO2: 117 MM HG (ref 75–95)
POSITIVE BASE EXCESS, ART: 8 (ref 0–2)
POTASSIUM SERPL-SCNC: 3.3 MMOL/L (ref 3.7–5.3)
SODIUM BLD-SCNC: 139 MMOL/L (ref 135–144)
TCO2 (CALC), ART: 33 MM HG (ref 23–28)

## 2017-04-18 PROCEDURE — 70450 CT HEAD/BRAIN W/O DYE: CPT

## 2017-04-18 PROCEDURE — 80048 BASIC METABOLIC PNL TOTAL CA: CPT

## 2017-04-18 PROCEDURE — 82803 BLOOD GASES ANY COMBINATION: CPT

## 2017-04-18 PROCEDURE — 6360000002 HC RX W HCPCS: Performed by: EMERGENCY MEDICINE

## 2017-04-18 PROCEDURE — 2500000003 HC RX 250 WO HCPCS: Performed by: EMERGENCY MEDICINE

## 2017-04-18 PROCEDURE — 6370000000 HC RX 637 (ALT 250 FOR IP): Performed by: EMERGENCY MEDICINE

## 2017-04-18 PROCEDURE — 94770 HC ETCO2 MONITOR DAILY: CPT

## 2017-04-18 PROCEDURE — 99211 OFF/OP EST MAY X REQ PHY/QHP: CPT

## 2017-04-18 PROCEDURE — 99232 SBSQ HOSP IP/OBS MODERATE 35: CPT | Performed by: PSYCHIATRY & NEUROLOGY

## 2017-04-18 PROCEDURE — 99291 CRITICAL CARE FIRST HOUR: CPT | Performed by: INTERNAL MEDICINE

## 2017-04-18 PROCEDURE — 2580000003 HC RX 258: Performed by: EMERGENCY MEDICINE

## 2017-04-18 PROCEDURE — 99291 CRITICAL CARE FIRST HOUR: CPT | Performed by: PSYCHIATRY & NEUROLOGY

## 2017-04-18 PROCEDURE — 83735 ASSAY OF MAGNESIUM: CPT

## 2017-04-18 PROCEDURE — 2580000003 HC RX 258: Performed by: NEUROLOGICAL SURGERY

## 2017-04-18 PROCEDURE — 93886 INTRACRANIAL COMPLETE STUDY: CPT

## 2017-04-18 PROCEDURE — 97110 THERAPEUTIC EXERCISES: CPT

## 2017-04-18 PROCEDURE — 2000000003 HC NEURO ICU R&B

## 2017-04-18 PROCEDURE — 94003 VENT MGMT INPAT SUBQ DAY: CPT

## 2017-04-18 PROCEDURE — S0028 INJECTION, FAMOTIDINE, 20 MG: HCPCS | Performed by: EMERGENCY MEDICINE

## 2017-04-18 PROCEDURE — 6370000000 HC RX 637 (ALT 250 FOR IP): Performed by: NEUROLOGICAL SURGERY

## 2017-04-18 PROCEDURE — 6370000000 HC RX 637 (ALT 250 FOR IP): Performed by: PSYCHIATRY & NEUROLOGY

## 2017-04-18 PROCEDURE — 94762 N-INVAS EAR/PLS OXIMTRY CONT: CPT

## 2017-04-18 RX ADMIN — SIMVASTATIN 80 MG: 40 TABLET, FILM COATED ORAL at 20:32

## 2017-04-18 RX ADMIN — FAMOTIDINE 20 MG: 10 INJECTION, SOLUTION INTRAVENOUS at 20:33

## 2017-04-18 RX ADMIN — ONDANSETRON 4 MG: 2 INJECTION, SOLUTION INTRAMUSCULAR; INTRAVENOUS at 20:50

## 2017-04-18 RX ADMIN — Medication 10 ML: at 20:33

## 2017-04-18 RX ADMIN — NIMODIPINE 60 MG: 60 SOLUTION ORAL at 12:04

## 2017-04-18 RX ADMIN — LEVETIRACETAM 1000 MG: 500 INJECTION, SOLUTION, CONCENTRATE INTRAVENOUS at 12:04

## 2017-04-18 RX ADMIN — ASPIRIN 81 MG: 81 TABLET, CHEWABLE ORAL at 09:01

## 2017-04-18 RX ADMIN — FAMOTIDINE 20 MG: 10 INJECTION, SOLUTION INTRAVENOUS at 09:01

## 2017-04-18 RX ADMIN — FENTANYL CITRATE 50 MCG: 50 INJECTION INTRAMUSCULAR; INTRAVENOUS at 20:50

## 2017-04-18 RX ADMIN — POTASSIUM CHLORIDE 40 MEQ: 40 SOLUTION ORAL at 05:04

## 2017-04-18 RX ADMIN — LEVETIRACETAM 1000 MG: 500 INJECTION, SOLUTION, CONCENTRATE INTRAVENOUS at 20:44

## 2017-04-18 RX ADMIN — MAGNESIUM SULFATE HEPTAHYDRATE: 500 INJECTION, SOLUTION INTRAMUSCULAR; INTRAVENOUS at 20:00

## 2017-04-18 RX ADMIN — NIMODIPINE 60 MG: 60 SOLUTION ORAL at 20:33

## 2017-04-18 RX ADMIN — NIMODIPINE 60 MG: 60 SOLUTION ORAL at 09:01

## 2017-04-18 RX ADMIN — PHENYLEPHRINE HYDROCHLORIDE 250 MCG/MIN: 10 INJECTION INTRAVENOUS at 22:46

## 2017-04-18 RX ADMIN — NIMODIPINE 60 MG: 60 SOLUTION ORAL at 17:00

## 2017-04-18 RX ADMIN — Medication 5 MG/HR: at 04:21

## 2017-04-18 RX ADMIN — NIMODIPINE 60 MG: 60 SOLUTION ORAL at 04:23

## 2017-04-18 RX ADMIN — MAGNESIUM SULFATE HEPTAHYDRATE: 500 INJECTION, SOLUTION INTRAMUSCULAR; INTRAVENOUS at 05:30

## 2017-04-18 RX ADMIN — ACETAMINOPHEN 650 MG: 325 TABLET ORAL at 21:10

## 2017-04-18 RX ADMIN — Medication 10 ML: at 09:02

## 2017-04-18 RX ADMIN — PHENYLEPHRINE HYDROCHLORIDE 40 MCG/MIN: 10 INJECTION INTRAVENOUS at 03:30

## 2017-04-18 ASSESSMENT — PULMONARY FUNCTION TESTS
PIF_VALUE: 17
PIF_VALUE: 17
PIF_VALUE: 15
PIF_VALUE: 17
PIF_VALUE: 18
PIF_VALUE: 17
PIF_VALUE: 17
PIF_VALUE: 16
PIF_VALUE: 16

## 2017-04-19 ENCOUNTER — APPOINTMENT (OUTPATIENT)
Dept: CT IMAGING | Age: 57
DRG: 004 | End: 2017-04-19
Attending: NEUROLOGICAL SURGERY
Payer: MEDICARE

## 2017-04-19 LAB
ANION GAP SERPL CALCULATED.3IONS-SCNC: 13 MMOL/L (ref 9–17)
BUN BLDV-MCNC: 8 MG/DL (ref 6–20)
BUN/CREAT BLD: ABNORMAL (ref 9–20)
CALCIUM SERPL-MCNC: 7.7 MG/DL (ref 8.6–10.4)
CHLORIDE BLD-SCNC: 104 MMOL/L (ref 98–107)
CO2: 26 MMOL/L (ref 20–31)
CREAT SERPL-MCNC: 0.24 MG/DL (ref 0.5–0.9)
EKG ATRIAL RATE: 120 BPM
EKG P AXIS: 44 DEGREES
EKG P-R INTERVAL: 136 MS
EKG Q-T INTERVAL: 342 MS
EKG QRS DURATION: 78 MS
EKG QTC CALCULATION (BAZETT): 483 MS
EKG R AXIS: 47 DEGREES
EKG T AXIS: 71 DEGREES
EKG VENTRICULAR RATE: 120 BPM
FIO2: 30
GFR AFRICAN AMERICAN: >60 ML/MIN
GFR NON-AFRICAN AMERICAN: >60 ML/MIN
GFR SERPL CREATININE-BSD FRML MDRD: ABNORMAL ML/MIN/{1.73_M2}
GFR SERPL CREATININE-BSD FRML MDRD: ABNORMAL ML/MIN/{1.73_M2}
GLUCOSE BLD-MCNC: 136 MG/DL (ref 65–105)
GLUCOSE BLD-MCNC: 177 MG/DL (ref 65–105)
GLUCOSE BLD-MCNC: 186 MG/DL (ref 70–99)
MAGNESIUM: 2.5 MG/DL (ref 1.6–2.6)
MAGNESIUM: 2.5 MG/DL (ref 1.6–2.6)
NEGATIVE BASE EXCESS, ART: ABNORMAL (ref 0–2)
O2 DEVICE/FLOW/%: ABNORMAL
PATIENT TEMP: 37.9
POC HCO3: 32.2 MMOL/L (ref 22–27)
POC O2 SATURATION: 99 %
POC PCO2 TEMP: ABNORMAL MM HG
POC PCO2: 43 MM HG (ref 32–45)
POC PH TEMP: ABNORMAL
POC PH: 7.49 (ref 7.35–7.45)
POC PO2 TEMP: ABNORMAL MM HG
POC PO2: 109 MM HG (ref 75–95)
POSITIVE BASE EXCESS, ART: 9 (ref 0–2)
POTASSIUM SERPL-SCNC: 3.4 MMOL/L (ref 3.7–5.3)
SODIUM BLD-SCNC: 143 MMOL/L (ref 135–144)
TCO2 (CALC), ART: 33 MM HG (ref 23–28)

## 2017-04-19 PROCEDURE — 2580000003 HC RX 258: Performed by: EMERGENCY MEDICINE

## 2017-04-19 PROCEDURE — 6370000000 HC RX 637 (ALT 250 FOR IP): Performed by: EMERGENCY MEDICINE

## 2017-04-19 PROCEDURE — 6360000002 HC RX W HCPCS: Performed by: EMERGENCY MEDICINE

## 2017-04-19 PROCEDURE — 83735 ASSAY OF MAGNESIUM: CPT

## 2017-04-19 PROCEDURE — 6360000004 HC RX CONTRAST MEDICATION: Performed by: NEUROLOGICAL SURGERY

## 2017-04-19 PROCEDURE — 82803 BLOOD GASES ANY COMBINATION: CPT

## 2017-04-19 PROCEDURE — 2500000003 HC RX 250 WO HCPCS: Performed by: EMERGENCY MEDICINE

## 2017-04-19 PROCEDURE — 99291 CRITICAL CARE FIRST HOUR: CPT | Performed by: INTERNAL MEDICINE

## 2017-04-19 PROCEDURE — 87040 BLOOD CULTURE FOR BACTERIA: CPT

## 2017-04-19 PROCEDURE — 87086 URINE CULTURE/COLONY COUNT: CPT

## 2017-04-19 PROCEDURE — 87205 SMEAR GRAM STAIN: CPT

## 2017-04-19 PROCEDURE — 93886 INTRACRANIAL COMPLETE STUDY: CPT

## 2017-04-19 PROCEDURE — 82947 ASSAY GLUCOSE BLOOD QUANT: CPT

## 2017-04-19 PROCEDURE — 99291 CRITICAL CARE FIRST HOUR: CPT | Performed by: PSYCHIATRY & NEUROLOGY

## 2017-04-19 PROCEDURE — 94770 HC ETCO2 MONITOR DAILY: CPT

## 2017-04-19 PROCEDURE — 87186 SC STD MICRODIL/AGAR DIL: CPT

## 2017-04-19 PROCEDURE — 6370000000 HC RX 637 (ALT 250 FOR IP): Performed by: NEUROLOGICAL SURGERY

## 2017-04-19 PROCEDURE — 70496 CT ANGIOGRAPHY HEAD: CPT

## 2017-04-19 PROCEDURE — 2580000003 HC RX 258: Performed by: NEUROLOGICAL SURGERY

## 2017-04-19 PROCEDURE — 6360000002 HC RX W HCPCS: Performed by: NEUROLOGICAL SURGERY

## 2017-04-19 PROCEDURE — 99233 SBSQ HOSP IP/OBS HIGH 50: CPT | Performed by: PSYCHIATRY & NEUROLOGY

## 2017-04-19 PROCEDURE — 6370000000 HC RX 637 (ALT 250 FOR IP): Performed by: PSYCHIATRY & NEUROLOGY

## 2017-04-19 PROCEDURE — 31500 INSERT EMERGENCY AIRWAY: CPT

## 2017-04-19 PROCEDURE — 87088 URINE BACTERIA CULTURE: CPT

## 2017-04-19 PROCEDURE — 2000000003 HC NEURO ICU R&B

## 2017-04-19 PROCEDURE — 94762 N-INVAS EAR/PLS OXIMTRY CONT: CPT

## 2017-04-19 PROCEDURE — 94003 VENT MGMT INPAT SUBQ DAY: CPT

## 2017-04-19 PROCEDURE — 36415 COLL VENOUS BLD VENIPUNCTURE: CPT

## 2017-04-19 PROCEDURE — S0028 INJECTION, FAMOTIDINE, 20 MG: HCPCS | Performed by: EMERGENCY MEDICINE

## 2017-04-19 PROCEDURE — 87070 CULTURE OTHR SPECIMN AEROBIC: CPT

## 2017-04-19 PROCEDURE — 80048 BASIC METABOLIC PNL TOTAL CA: CPT

## 2017-04-19 RX ORDER — LIDOCAINE HYDROCHLORIDE 40 MG/ML
4 INJECTION, SOLUTION RETROBULBAR; TOPICAL
Status: DISCONTINUED | OUTPATIENT
Start: 2017-04-19 | End: 2017-05-08 | Stop reason: HOSPADM

## 2017-04-19 RX ORDER — DEXTROSE MONOHYDRATE 50 MG/ML
100 INJECTION, SOLUTION INTRAVENOUS PRN
Status: DISCONTINUED | OUTPATIENT
Start: 2017-04-19 | End: 2017-05-08 | Stop reason: HOSPADM

## 2017-04-19 RX ORDER — NICOTINE POLACRILEX 4 MG
15 LOZENGE BUCCAL PRN
Status: DISCONTINUED | OUTPATIENT
Start: 2017-04-19 | End: 2017-05-08 | Stop reason: HOSPADM

## 2017-04-19 RX ORDER — DEXTROSE MONOHYDRATE 25 G/50ML
12.5 INJECTION, SOLUTION INTRAVENOUS PRN
Status: DISCONTINUED | OUTPATIENT
Start: 2017-04-19 | End: 2017-05-08 | Stop reason: HOSPADM

## 2017-04-19 RX ORDER — MAGNESIUM SULFATE 1 G/100ML
1 INJECTION INTRAVENOUS PRN
Status: DISCONTINUED | OUTPATIENT
Start: 2017-04-19 | End: 2017-04-20

## 2017-04-19 RX ORDER — SODIUM CHLORIDE 9 MG/ML
INJECTION, SOLUTION INTRAVENOUS CONTINUOUS
Status: DISCONTINUED | OUTPATIENT
Start: 2017-04-19 | End: 2017-05-05

## 2017-04-19 RX ADMIN — NIMODIPINE 30 MG: 60 SOLUTION ORAL at 11:59

## 2017-04-19 RX ADMIN — SODIUM CHLORIDE: 9 INJECTION, SOLUTION INTRAVENOUS at 00:00

## 2017-04-19 RX ADMIN — HYDROMORPHONE HYDROCHLORIDE 0.5 MG: 1 INJECTION, SOLUTION INTRAMUSCULAR; INTRAVENOUS; SUBCUTANEOUS at 02:30

## 2017-04-19 RX ADMIN — ACETAMINOPHEN 650 MG: 325 TABLET ORAL at 08:47

## 2017-04-19 RX ADMIN — Medication 10 MCG/MIN: at 18:34

## 2017-04-19 RX ADMIN — SIMVASTATIN 80 MG: 40 TABLET, FILM COATED ORAL at 20:33

## 2017-04-19 RX ADMIN — LEVETIRACETAM 1000 MG: 500 INJECTION, SOLUTION, CONCENTRATE INTRAVENOUS at 08:48

## 2017-04-19 RX ADMIN — Medication 5 MG/HR: at 18:42

## 2017-04-19 RX ADMIN — NIMODIPINE 30 MG: 60 SOLUTION ORAL at 18:36

## 2017-04-19 RX ADMIN — ASPIRIN 81 MG: 81 TABLET, CHEWABLE ORAL at 08:48

## 2017-04-19 RX ADMIN — NIMODIPINE 30 MG: 60 SOLUTION ORAL at 22:29

## 2017-04-19 RX ADMIN — OXYCODONE HYDROCHLORIDE AND ACETAMINOPHEN 2 TABLET: 5; 325 TABLET ORAL at 20:33

## 2017-04-19 RX ADMIN — PHENYLEPHRINE HYDROCHLORIDE 200 MCG/MIN: 10 INJECTION INTRAVENOUS at 02:28

## 2017-04-19 RX ADMIN — ACETAMINOPHEN 650 MG: 325 TABLET ORAL at 21:12

## 2017-04-19 RX ADMIN — NIMODIPINE 60 MG: 60 SOLUTION ORAL at 00:30

## 2017-04-19 RX ADMIN — NIMODIPINE 30 MG: 60 SOLUTION ORAL at 20:42

## 2017-04-19 RX ADMIN — NIMODIPINE 60 MG: 60 SOLUTION ORAL at 04:15

## 2017-04-19 RX ADMIN — MAGNESIUM SULFATE HEPTAHYDRATE: 500 INJECTION, SOLUTION INTRAMUSCULAR; INTRAVENOUS at 09:13

## 2017-04-19 RX ADMIN — MAGNESIUM SULFATE HEPTAHYDRATE: 500 INJECTION, SOLUTION INTRAMUSCULAR; INTRAVENOUS at 02:28

## 2017-04-19 RX ADMIN — IOVERSOL 90 ML: 741 INJECTION INTRA-ARTERIAL; INTRAVENOUS at 17:23

## 2017-04-19 RX ADMIN — Medication 10 ML: at 20:35

## 2017-04-19 RX ADMIN — ACETAMINOPHEN 650 MG: 325 TABLET ORAL at 17:10

## 2017-04-19 RX ADMIN — MAGNESIUM SULFATE IN DEXTROSE 1 G: 10 INJECTION, SOLUTION INTRAVENOUS at 13:01

## 2017-04-19 RX ADMIN — NIMODIPINE 30 MG: 60 SOLUTION ORAL at 16:05

## 2017-04-19 RX ADMIN — INSULIN LISPRO 1 UNITS: 100 INJECTION, SOLUTION INTRAVENOUS; SUBCUTANEOUS at 11:59

## 2017-04-19 RX ADMIN — NIMODIPINE 60 MG: 60 SOLUTION ORAL at 08:14

## 2017-04-19 RX ADMIN — CEFEPIME 2 G: 2 INJECTION, POWDER, FOR SOLUTION INTRAVENOUS at 22:01

## 2017-04-19 RX ADMIN — Medication 5 MG/HR: at 02:33

## 2017-04-19 RX ADMIN — ENOXAPARIN SODIUM 30 MG: 100 INJECTION SUBCUTANEOUS at 20:32

## 2017-04-19 RX ADMIN — Medication 8 MCG/MIN: at 00:53

## 2017-04-19 RX ADMIN — LEVETIRACETAM 1000 MG: 500 INJECTION, SOLUTION, CONCENTRATE INTRAVENOUS at 21:02

## 2017-04-19 RX ADMIN — FAMOTIDINE 20 MG: 10 INJECTION, SOLUTION INTRAVENOUS at 08:47

## 2017-04-19 RX ADMIN — FAMOTIDINE 20 MG: 10 INJECTION, SOLUTION INTRAVENOUS at 20:34

## 2017-04-19 RX ADMIN — NIMODIPINE 30 MG: 60 SOLUTION ORAL at 14:05

## 2017-04-19 RX ADMIN — MAGNESIUM SULFATE HEPTAHYDRATE: 500 INJECTION, SOLUTION INTRAMUSCULAR; INTRAVENOUS at 18:10

## 2017-04-19 ASSESSMENT — PAIN SCALES - GENERAL
PAINLEVEL_OUTOF10: 0
PAINLEVEL_OUTOF10: 8
PAINLEVEL_OUTOF10: 8

## 2017-04-19 ASSESSMENT — PULMONARY FUNCTION TESTS
PIF_VALUE: 14
PIF_VALUE: 14
PIF_VALUE: 7
PIF_VALUE: 11
PIF_VALUE: 13
PIF_VALUE: 11
PIF_VALUE: 14
PIF_VALUE: 14
PIF_VALUE: 18
PIF_VALUE: 14
PIF_VALUE: 11

## 2017-04-19 ASSESSMENT — PAIN DESCRIPTION - LOCATION: LOCATION: GENERALIZED

## 2017-04-19 ASSESSMENT — PAIN DESCRIPTION - PAIN TYPE: TYPE: ACUTE PAIN

## 2017-04-20 LAB
ABSOLUTE EOS #: 0 K/UL (ref 0–0.4)
ABSOLUTE LYMPH #: 1.67 K/UL (ref 1–4.8)
ABSOLUTE MONO #: 1.22 K/UL (ref 0.1–0.8)
ANION GAP SERPL CALCULATED.3IONS-SCNC: 12 MMOL/L (ref 9–17)
APPEARANCE CSF: ABNORMAL
BASOPHILS # BLD: 0 % (ref 0–2)
BASOPHILS ABSOLUTE: 0 K/UL (ref 0–0.2)
BUN BLDV-MCNC: 7 MG/DL (ref 6–20)
BUN/CREAT BLD: ABNORMAL (ref 9–20)
CALCIUM SERPL-MCNC: 7.5 MG/DL (ref 8.6–10.4)
CHLORIDE BLD-SCNC: 105 MMOL/L (ref 98–107)
CO2: 26 MMOL/L (ref 20–31)
CREAT SERPL-MCNC: 0.24 MG/DL (ref 0.5–0.9)
DIFFERENTIAL TYPE: ABNORMAL
EOSINOPHILS RELATIVE PERCENT: 0 % (ref 1–4)
FIO2: 30
GFR AFRICAN AMERICAN: >60 ML/MIN
GFR NON-AFRICAN AMERICAN: >60 ML/MIN
GFR SERPL CREATININE-BSD FRML MDRD: ABNORMAL ML/MIN/{1.73_M2}
GFR SERPL CREATININE-BSD FRML MDRD: ABNORMAL ML/MIN/{1.73_M2}
GLUCOSE BLD-MCNC: 133 MG/DL (ref 65–105)
GLUCOSE BLD-MCNC: 140 MG/DL (ref 65–105)
GLUCOSE BLD-MCNC: 143 MG/DL (ref 65–105)
GLUCOSE BLD-MCNC: 143 MG/DL (ref 70–99)
GLUCOSE, CSF: 93 MG/DL (ref 40–70)
HCT VFR BLD CALC: 35.1 % (ref 36–46)
HEMOGLOBIN: 12 G/DL (ref 12–16)
LYMPHOCYTES # BLD: 15 % (ref 24–44)
MAGNESIUM: 2.3 MG/DL (ref 1.6–2.6)
MAGNESIUM: 2.5 MG/DL (ref 1.6–2.6)
MAGNESIUM: 2.8 MG/DL (ref 1.6–2.6)
MCH RBC QN AUTO: 31.6 PG (ref 26–34)
MCHC RBC AUTO-ENTMCNC: 34.3 G/DL (ref 31–37)
MCV RBC AUTO: 92.2 FL (ref 80–100)
MONOCYTES # BLD: 11 % (ref 1–7)
MORPHOLOGY: NORMAL
NEGATIVE BASE EXCESS, ART: ABNORMAL (ref 0–2)
O2 DEVICE/FLOW/%: ABNORMAL
PATIENT TEMP: ABNORMAL
PDW BLD-RTO: 13.3 % (ref 12.5–15.4)
PLATELET # BLD: 268 K/UL (ref 140–450)
PLATELET ESTIMATE: ABNORMAL
PMV BLD AUTO: 6.8 FL (ref 6–12)
POC HCO3: 30.4 MMOL/L (ref 22–27)
POC O2 SATURATION: 97 %
POC PCO2 TEMP: ABNORMAL MM HG
POC PCO2: 45 MM HG (ref 32–45)
POC PH TEMP: ABNORMAL
POC PH: 7.44 (ref 7.35–7.45)
POC PO2 TEMP: ABNORMAL MM HG
POC PO2: 91 MM HG (ref 75–95)
POSITIVE BASE EXCESS, ART: 6 (ref 0–2)
POTASSIUM SERPL-SCNC: 3.4 MMOL/L (ref 3.7–5.3)
PROTEIN CSF: 45.9 MG/DL (ref 15–45)
RBC # BLD: 3.81 M/UL (ref 4–5.2)
RBC # BLD: ABNORMAL 10*6/UL
RBC CSF: ABNORMAL /MM3
SEG NEUTROPHILS: 74 % (ref 36–66)
SEGMENTED NEUTROPHILS ABSOLUTE COUNT: 8.21 K/UL (ref 1.8–7.7)
SODIUM BLD-SCNC: 143 MMOL/L (ref 135–144)
SUPERNAT COLOR CSF: ABNORMAL
TCO2 (CALC), ART: 32 MM HG (ref 23–28)
TUBE NUMBER CSF: ABNORMAL
VOLUME CSF: 4
WBC # BLD: 11.1 K/UL (ref 3.5–11)
WBC # BLD: ABNORMAL 10*3/UL
WBC CSF: 215 /MM3
XANTHOCHROMIA: PRESENT

## 2017-04-20 PROCEDURE — 6370000000 HC RX 637 (ALT 250 FOR IP): Performed by: EMERGENCY MEDICINE

## 2017-04-20 PROCEDURE — 6360000002 HC RX W HCPCS: Performed by: EMERGENCY MEDICINE

## 2017-04-20 PROCEDURE — 82947 ASSAY GLUCOSE BLOOD QUANT: CPT

## 2017-04-20 PROCEDURE — 83735 ASSAY OF MAGNESIUM: CPT

## 2017-04-20 PROCEDURE — S0028 INJECTION, FAMOTIDINE, 20 MG: HCPCS | Performed by: EMERGENCY MEDICINE

## 2017-04-20 PROCEDURE — 99291 CRITICAL CARE FIRST HOUR: CPT | Performed by: INTERNAL MEDICINE

## 2017-04-20 PROCEDURE — 84157 ASSAY OF PROTEIN OTHER: CPT

## 2017-04-20 PROCEDURE — 6360000002 HC RX W HCPCS: Performed by: NEUROLOGICAL SURGERY

## 2017-04-20 PROCEDURE — 94003 VENT MGMT INPAT SUBQ DAY: CPT

## 2017-04-20 PROCEDURE — 2500000003 HC RX 250 WO HCPCS: Performed by: EMERGENCY MEDICINE

## 2017-04-20 PROCEDURE — 94770 HC ETCO2 MONITOR DAILY: CPT

## 2017-04-20 PROCEDURE — 82803 BLOOD GASES ANY COMBINATION: CPT

## 2017-04-20 PROCEDURE — 93886 INTRACRANIAL COMPLETE STUDY: CPT

## 2017-04-20 PROCEDURE — 2580000003 HC RX 258: Performed by: EMERGENCY MEDICINE

## 2017-04-20 PROCEDURE — 6370000000 HC RX 637 (ALT 250 FOR IP): Performed by: NEUROLOGICAL SURGERY

## 2017-04-20 PROCEDURE — 80048 BASIC METABOLIC PNL TOTAL CA: CPT

## 2017-04-20 PROCEDURE — 82945 GLUCOSE OTHER FLUID: CPT

## 2017-04-20 PROCEDURE — 85025 COMPLETE CBC W/AUTO DIFF WBC: CPT

## 2017-04-20 PROCEDURE — 94762 N-INVAS EAR/PLS OXIMTRY CONT: CPT

## 2017-04-20 PROCEDURE — 89051 BODY FLUID CELL COUNT: CPT

## 2017-04-20 PROCEDURE — 99233 SBSQ HOSP IP/OBS HIGH 50: CPT | Performed by: PSYCHIATRY & NEUROLOGY

## 2017-04-20 PROCEDURE — 89220 SPUTUM SPECIMEN COLLECTION: CPT

## 2017-04-20 PROCEDURE — 2580000003 HC RX 258: Performed by: NEUROLOGICAL SURGERY

## 2017-04-20 PROCEDURE — 6370000000 HC RX 637 (ALT 250 FOR IP): Performed by: PSYCHIATRY & NEUROLOGY

## 2017-04-20 PROCEDURE — 2000000003 HC NEURO ICU R&B

## 2017-04-20 PROCEDURE — 2500000003 HC RX 250 WO HCPCS: Performed by: INTERNAL MEDICINE

## 2017-04-20 RX ADMIN — ACETAMINOPHEN 650 MG: 325 TABLET ORAL at 01:53

## 2017-04-20 RX ADMIN — FAMOTIDINE 20 MG: 10 INJECTION, SOLUTION INTRAVENOUS at 20:39

## 2017-04-20 RX ADMIN — NIMODIPINE 30 MG: 60 SOLUTION ORAL at 11:53

## 2017-04-20 RX ADMIN — Medication 5 MG/HR: at 18:26

## 2017-04-20 RX ADMIN — INSULIN LISPRO 1 UNITS: 100 INJECTION, SOLUTION INTRAVENOUS; SUBCUTANEOUS at 23:43

## 2017-04-20 RX ADMIN — FENTANYL CITRATE 50 MCG: 50 INJECTION INTRAMUSCULAR; INTRAVENOUS at 23:33

## 2017-04-20 RX ADMIN — MAGNESIUM SULFATE HEPTAHYDRATE: 500 INJECTION, SOLUTION INTRAMUSCULAR; INTRAVENOUS at 00:34

## 2017-04-20 RX ADMIN — Medication 10 ML: at 08:14

## 2017-04-20 RX ADMIN — MAGNESIUM SULFATE HEPTAHYDRATE: 500 INJECTION, SOLUTION INTRAMUSCULAR; INTRAVENOUS at 17:28

## 2017-04-20 RX ADMIN — NIMODIPINE 30 MG: 60 SOLUTION ORAL at 01:55

## 2017-04-20 RX ADMIN — NIMODIPINE 30 MG: 60 SOLUTION ORAL at 16:26

## 2017-04-20 RX ADMIN — HYDROMORPHONE HYDROCHLORIDE 0.5 MG: 1 INJECTION, SOLUTION INTRAMUSCULAR; INTRAVENOUS; SUBCUTANEOUS at 23:53

## 2017-04-20 RX ADMIN — FAMOTIDINE 20 MG: 10 INJECTION, SOLUTION INTRAVENOUS at 08:11

## 2017-04-20 RX ADMIN — POTASSIUM CHLORIDE 40 MEQ: 40 SOLUTION ORAL at 09:53

## 2017-04-20 RX ADMIN — LEVETIRACETAM 1000 MG: 500 INJECTION, SOLUTION, CONCENTRATE INTRAVENOUS at 21:22

## 2017-04-20 RX ADMIN — OXYCODONE HYDROCHLORIDE AND ACETAMINOPHEN 1 TABLET: 5; 325 TABLET ORAL at 01:53

## 2017-04-20 RX ADMIN — CEFEPIME 2 G: 2 INJECTION, POWDER, FOR SOLUTION INTRAVENOUS at 09:16

## 2017-04-20 RX ADMIN — Medication 18 MCG/MIN: at 04:12

## 2017-04-20 RX ADMIN — NIMODIPINE 30 MG: 60 SOLUTION ORAL at 23:43

## 2017-04-20 RX ADMIN — MAGNESIUM SULFATE IN DEXTROSE 1 G: 10 INJECTION, SOLUTION INTRAVENOUS at 07:55

## 2017-04-20 RX ADMIN — NIMODIPINE 30 MG: 60 SOLUTION ORAL at 08:33

## 2017-04-20 RX ADMIN — SODIUM CHLORIDE: 9 INJECTION, SOLUTION INTRAVENOUS at 04:15

## 2017-04-20 RX ADMIN — NIMODIPINE 30 MG: 60 SOLUTION ORAL at 22:13

## 2017-04-20 RX ADMIN — NIMODIPINE 30 MG: 60 SOLUTION ORAL at 14:16

## 2017-04-20 RX ADMIN — LIDOCAINE HYDROCHLORIDE 4 ML: 40 INJECTION, SOLUTION RETROBULBAR; TOPICAL at 20:18

## 2017-04-20 RX ADMIN — Medication 5 MG/HR: at 04:14

## 2017-04-20 RX ADMIN — NIMODIPINE 30 MG: 60 SOLUTION ORAL at 06:15

## 2017-04-20 RX ADMIN — MAGNESIUM SULFATE HEPTAHYDRATE: 500 INJECTION, SOLUTION INTRAMUSCULAR; INTRAVENOUS at 04:15

## 2017-04-20 RX ADMIN — NIMODIPINE 30 MG: 60 SOLUTION ORAL at 04:23

## 2017-04-20 RX ADMIN — ENOXAPARIN SODIUM 30 MG: 100 INJECTION SUBCUTANEOUS at 08:11

## 2017-04-20 RX ADMIN — LIDOCAINE HYDROCHLORIDE 4 ML: 40 INJECTION, SOLUTION RETROBULBAR; TOPICAL at 02:00

## 2017-04-20 RX ADMIN — ENOXAPARIN SODIUM 30 MG: 100 INJECTION SUBCUTANEOUS at 20:39

## 2017-04-20 RX ADMIN — HYDROMORPHONE HYDROCHLORIDE 0.5 MG: 1 INJECTION, SOLUTION INTRAMUSCULAR; INTRAVENOUS; SUBCUTANEOUS at 10:30

## 2017-04-20 RX ADMIN — FENTANYL CITRATE 25 MCG: 50 INJECTION INTRAMUSCULAR; INTRAVENOUS at 08:19

## 2017-04-20 RX ADMIN — ACETAMINOPHEN 650 MG: 325 TABLET ORAL at 23:53

## 2017-04-20 RX ADMIN — OXYCODONE HYDROCHLORIDE AND ACETAMINOPHEN 1 TABLET: 5; 325 TABLET ORAL at 09:53

## 2017-04-20 RX ADMIN — NIMODIPINE 30 MG: 60 SOLUTION ORAL at 17:59

## 2017-04-20 RX ADMIN — LEVETIRACETAM 1000 MG: 500 INJECTION, SOLUTION, CONCENTRATE INTRAVENOUS at 09:16

## 2017-04-20 RX ADMIN — ACETAMINOPHEN 650 MG: 325 TABLET ORAL at 17:17

## 2017-04-20 RX ADMIN — SIMVASTATIN 80 MG: 40 TABLET, FILM COATED ORAL at 20:40

## 2017-04-20 RX ADMIN — MAGNESIUM SULFATE IN DEXTROSE 1 G: 10 INJECTION, SOLUTION INTRAVENOUS at 23:11

## 2017-04-20 RX ADMIN — CEFEPIME 2 G: 2 INJECTION, POWDER, FOR SOLUTION INTRAVENOUS at 20:40

## 2017-04-20 RX ADMIN — DOCUSATE SODIUM 100 MG: 50 LIQUID ORAL at 08:10

## 2017-04-20 RX ADMIN — NIMODIPINE 30 MG: 60 SOLUTION ORAL at 10:10

## 2017-04-20 RX ADMIN — ASPIRIN 81 MG: 81 TABLET, CHEWABLE ORAL at 08:10

## 2017-04-20 RX ADMIN — MAGNESIUM SULFATE HEPTAHYDRATE: 500 INJECTION, SOLUTION INTRAMUSCULAR; INTRAVENOUS at 10:46

## 2017-04-20 RX ADMIN — NIMODIPINE 30 MG: 60 SOLUTION ORAL at 20:39

## 2017-04-20 RX ADMIN — NIMODIPINE 30 MG: 60 SOLUTION ORAL at 00:16

## 2017-04-20 RX ADMIN — HYDROMORPHONE HYDROCHLORIDE 0.5 MG: 1 INJECTION, SOLUTION INTRAMUSCULAR; INTRAVENOUS; SUBCUTANEOUS at 16:11

## 2017-04-20 RX ADMIN — Medication 12 MCG/MIN: at 21:28

## 2017-04-20 ASSESSMENT — PAIN SCALES - GENERAL
PAINLEVEL_OUTOF10: 0
PAINLEVEL_OUTOF10: 4
PAINLEVEL_OUTOF10: 0
PAINLEVEL_OUTOF10: 0
PAINLEVEL_OUTOF10: 8
PAINLEVEL_OUTOF10: 9
PAINLEVEL_OUTOF10: 6
PAINLEVEL_OUTOF10: 4
PAINLEVEL_OUTOF10: 0

## 2017-04-20 ASSESSMENT — PULMONARY FUNCTION TESTS
PIF_VALUE: 10
PIF_VALUE: 53
PIF_VALUE: 13
PIF_VALUE: 9
PIF_VALUE: 16
PIF_VALUE: 11
PIF_VALUE: 18
PIF_VALUE: 23
PIF_VALUE: 11
PIF_VALUE: 17
PIF_VALUE: 9
PIF_VALUE: 9
PIF_VALUE: 10
PIF_VALUE: 13
PIF_VALUE: 28
PIF_VALUE: 16
PIF_VALUE: 9
PIF_VALUE: 10
PIF_VALUE: 10
PIF_VALUE: 16
PIF_VALUE: 7
PIF_VALUE: 8

## 2017-04-20 ASSESSMENT — PAIN DESCRIPTION - LOCATION: LOCATION: GENERALIZED

## 2017-04-21 ENCOUNTER — APPOINTMENT (OUTPATIENT)
Dept: GENERAL RADIOLOGY | Age: 57
DRG: 004 | End: 2017-04-21
Attending: NEUROLOGICAL SURGERY
Payer: MEDICARE

## 2017-04-21 ENCOUNTER — APPOINTMENT (OUTPATIENT)
Dept: INTERVENTIONAL RADIOLOGY/VASCULAR | Age: 57
DRG: 004 | End: 2017-04-21
Attending: NEUROLOGICAL SURGERY
Payer: MEDICARE

## 2017-04-21 LAB
ABSOLUTE EOS #: 0 K/UL (ref 0–0.4)
ABSOLUTE LYMPH #: 1.2 K/UL (ref 1–4.8)
ABSOLUTE MONO #: 0.9 K/UL (ref 0.1–1.2)
ANION GAP SERPL CALCULATED.3IONS-SCNC: 13 MMOL/L (ref 9–17)
APPEARANCE CSF: ABNORMAL
BANDS, CSF: NORMAL %
BANDS, CSF: NORMAL %
BASO CSF: NORMAL %
BASO CSF: NORMAL %
BASOPHILS # BLD: 1 % (ref 0–2)
BASOPHILS ABSOLUTE: 0.1 K/UL (ref 0–0.2)
BLAST CSF: NORMAL %
BLAST CSF: NORMAL %
BUN BLDV-MCNC: 5 MG/DL (ref 6–20)
BUN/CREAT BLD: ABNORMAL (ref 9–20)
CALCIUM SERPL-MCNC: 7.6 MG/DL (ref 8.6–10.4)
CHLORIDE BLD-SCNC: 97 MMOL/L (ref 98–107)
CO2: 26 MMOL/L (ref 20–31)
CREAT SERPL-MCNC: 0.24 MG/DL (ref 0.5–0.9)
CULTURE: ABNORMAL
CULTURE: NORMAL
CULTURE: NORMAL
DIFFERENTIAL TYPE: ABNORMAL
DIRECT EXAM: ABNORMAL
DIRECT EXAM: NORMAL
EOS CSF: NORMAL %
EOS CSF: NORMAL %
EOSINOPHILS RELATIVE PERCENT: 0 % (ref 1–4)
FIO2: 30
FLUID DIFF COMMENT: NORMAL
FLUID DIFF COMMENT: NORMAL
GFR AFRICAN AMERICAN: >60 ML/MIN
GFR NON-AFRICAN AMERICAN: >60 ML/MIN
GFR SERPL CREATININE-BSD FRML MDRD: ABNORMAL ML/MIN/{1.73_M2}
GFR SERPL CREATININE-BSD FRML MDRD: ABNORMAL ML/MIN/{1.73_M2}
GLUCOSE BLD-MCNC: 127 MG/DL (ref 65–105)
GLUCOSE BLD-MCNC: 137 MG/DL (ref 65–105)
GLUCOSE BLD-MCNC: 139 MG/DL (ref 70–99)
GLUCOSE BLD-MCNC: 144 MG/DL (ref 65–105)
GLUCOSE BLD-MCNC: 152 MG/DL (ref 65–105)
GLUCOSE BLD-MCNC: 165 MG/DL (ref 65–105)
HCT VFR BLD CALC: 35.1 % (ref 36–46)
HEMOGLOBIN: 12.2 G/DL (ref 12–16)
LYMPHOCYTES # BLD: 12 % (ref 24–44)
LYMPHS CSF: 7 %
LYMPHS CSF: 7 %
Lab: ABNORMAL
Lab: ABNORMAL
Lab: NORMAL
MAGNESIUM: 2.6 MG/DL (ref 1.6–2.6)
MAGNESIUM: 2.6 MG/DL (ref 1.6–2.6)
MCH RBC QN AUTO: 31.8 PG (ref 26–34)
MCHC RBC AUTO-ENTMCNC: 34.7 G/DL (ref 31–37)
MCV RBC AUTO: 91.5 FL (ref 80–100)
METAYELO CSF: NORMAL %
METAYELO CSF: NORMAL %
MONO/MACROPHAGE CSF (MANUAL): NORMAL %
MONO/MACROPHAGE CSF (MANUAL): NORMAL %
MONOCYTES # BLD: 9 % (ref 2–11)
MYELOCYTE CSF: NORMAL %
MYELOCYTE CSF: NORMAL %
NEGATIVE BASE EXCESS, ART: ABNORMAL (ref 0–2)
NEUTROPHILS, CSF: 85 %
NEUTROPHILS, CSF: 90 %
O2 DEVICE/FLOW/%: ABNORMAL
ORGANISM: ABNORMAL
ORGANISM: ABNORMAL
OTHER CELLS FLUID: NORMAL %
OTHER CELLS FLUID: NORMAL %
PATIENT TEMP: ABNORMAL
PDW BLD-RTO: 12.8 % (ref 12.5–15.4)
PLATELET # BLD: 267 K/UL (ref 140–450)
PLATELET ESTIMATE: ABNORMAL
PMV BLD AUTO: 6.9 FL (ref 6–12)
POC HCO3: 31 MMOL/L (ref 22–27)
POC O2 SATURATION: 99 %
POC PCO2 TEMP: ABNORMAL MM HG
POC PCO2: 42 MM HG (ref 32–45)
POC PH TEMP: ABNORMAL
POC PH: 7.48 (ref 7.35–7.45)
POC PO2 TEMP: ABNORMAL MM HG
POC PO2: 110 MM HG (ref 75–95)
POSITIVE BASE EXCESS, ART: 7 (ref 0–2)
POTASSIUM SERPL-SCNC: 3.2 MMOL/L (ref 3.7–5.3)
RBC # BLD: 3.83 M/UL (ref 4–5.2)
RBC # BLD: ABNORMAL 10*6/UL
RBC CSF: ABNORMAL /MM3
SEG NEUTROPHILS: 78 % (ref 36–66)
SEGMENTED NEUTROPHILS ABSOLUTE COUNT: 7.6 K/UL (ref 1.8–7.7)
SODIUM BLD-SCNC: 136 MMOL/L (ref 135–144)
SPECIMEN DESCRIPTION: ABNORMAL
SPECIMEN DESCRIPTION: ABNORMAL
SPECIMEN DESCRIPTION: NORMAL
STATUS: ABNORMAL
STATUS: ABNORMAL
STATUS: NORMAL
SUPERNAT COLOR CSF: ABNORMAL
TCO2 (CALC), ART: 32 MM HG (ref 23–28)
TUBE NUMBER CSF: ABNORMAL
VOLUME CSF: 3
WBC # BLD: 9.7 K/UL (ref 3.5–11)
WBC # BLD: ABNORMAL 10*3/UL
WBC CSF: 187 /MM3
XANTHOCHROMIA: PRESENT

## 2017-04-21 PROCEDURE — 61650 EVASC PRLNG ADMN RX AGNT 1ST: CPT | Performed by: PSYCHIATRY & NEUROLOGY

## 2017-04-21 PROCEDURE — 94762 N-INVAS EAR/PLS OXIMTRY CONT: CPT

## 2017-04-21 PROCEDURE — 6370000000 HC RX 637 (ALT 250 FOR IP): Performed by: EMERGENCY MEDICINE

## 2017-04-21 PROCEDURE — 94003 VENT MGMT INPAT SUBQ DAY: CPT

## 2017-04-21 PROCEDURE — 6360000002 HC RX W HCPCS: Performed by: EMERGENCY MEDICINE

## 2017-04-21 PROCEDURE — 6360000002 HC RX W HCPCS

## 2017-04-21 PROCEDURE — 99291 CRITICAL CARE FIRST HOUR: CPT | Performed by: INTERNAL MEDICINE

## 2017-04-21 PROCEDURE — 80048 BASIC METABOLIC PNL TOTAL CA: CPT

## 2017-04-21 PROCEDURE — 6360000002 HC RX W HCPCS: Performed by: STUDENT IN AN ORGANIZED HEALTH CARE EDUCATION/TRAINING PROGRAM

## 2017-04-21 PROCEDURE — 6370000000 HC RX 637 (ALT 250 FOR IP): Performed by: PSYCHIATRY & NEUROLOGY

## 2017-04-21 PROCEDURE — B31GYZZ FLUOROSCOPY OF BILATERAL VERTEBRAL ARTERIES USING OTHER CONTRAST: ICD-10-PCS | Performed by: PSYCHIATRY & NEUROLOGY

## 2017-04-21 PROCEDURE — 36600 WITHDRAWAL OF ARTERIAL BLOOD: CPT

## 2017-04-21 PROCEDURE — 6360000002 HC RX W HCPCS: Performed by: PSYCHIATRY & NEUROLOGY

## 2017-04-21 PROCEDURE — 2000000003 HC NEURO ICU R&B

## 2017-04-21 PROCEDURE — 82947 ASSAY GLUCOSE BLOOD QUANT: CPT

## 2017-04-21 PROCEDURE — B315YZZ FLUOROSCOPY OF BILATERAL COMMON CAROTID ARTERIES USING OTHER CONTRAST: ICD-10-PCS | Performed by: PSYCHIATRY & NEUROLOGY

## 2017-04-21 PROCEDURE — 61651 EVASC PRLNG ADMN RX AGNT ADD: CPT | Performed by: PSYCHIATRY & NEUROLOGY

## 2017-04-21 PROCEDURE — 99024 POSTOP FOLLOW-UP VISIT: CPT | Performed by: NEUROLOGICAL SURGERY

## 2017-04-21 PROCEDURE — 6360000002 HC RX W HCPCS: Performed by: NEUROLOGICAL SURGERY

## 2017-04-21 PROCEDURE — S0028 INJECTION, FAMOTIDINE, 20 MG: HCPCS | Performed by: EMERGENCY MEDICINE

## 2017-04-21 PROCEDURE — 6360000004 HC RX CONTRAST MEDICATION: Performed by: NEUROLOGICAL SURGERY

## 2017-04-21 PROCEDURE — 36226 PLACE CATH VERTEBRAL ART: CPT | Performed by: PSYCHIATRY & NEUROLOGY

## 2017-04-21 PROCEDURE — 83735 ASSAY OF MAGNESIUM: CPT

## 2017-04-21 PROCEDURE — 85025 COMPLETE CBC W/AUTO DIFF WBC: CPT

## 2017-04-21 PROCEDURE — 36415 COLL VENOUS BLD VENIPUNCTURE: CPT

## 2017-04-21 PROCEDURE — 82803 BLOOD GASES ANY COMBINATION: CPT

## 2017-04-21 PROCEDURE — 99153 MOD SED SAME PHYS/QHP EA: CPT | Performed by: PSYCHIATRY & NEUROLOGY

## 2017-04-21 PROCEDURE — P9046 ALBUMIN (HUMAN), 25%, 20 ML: HCPCS | Performed by: PSYCHIATRY & NEUROLOGY

## 2017-04-21 PROCEDURE — 2500000003 HC RX 250 WO HCPCS: Performed by: EMERGENCY MEDICINE

## 2017-04-21 PROCEDURE — 2580000003 HC RX 258: Performed by: NEUROLOGICAL SURGERY

## 2017-04-21 PROCEDURE — 89051 BODY FLUID CELL COUNT: CPT

## 2017-04-21 PROCEDURE — 94640 AIRWAY INHALATION TREATMENT: CPT

## 2017-04-21 PROCEDURE — 2580000003 HC RX 258: Performed by: EMERGENCY MEDICINE

## 2017-04-21 PROCEDURE — B318YZZ FLUOROSCOPY OF BILATERAL INTERNAL CAROTID ARTERIES USING OTHER CONTRAST: ICD-10-PCS | Performed by: PSYCHIATRY & NEUROLOGY

## 2017-04-21 PROCEDURE — 2500000003 HC RX 250 WO HCPCS: Performed by: INTERNAL MEDICINE

## 2017-04-21 PROCEDURE — C1894 INTRO/SHEATH, NON-LASER: HCPCS

## 2017-04-21 PROCEDURE — 36620 INSERTION CATHETER ARTERY: CPT

## 2017-04-21 PROCEDURE — 94770 HC ETCO2 MONITOR DAILY: CPT

## 2017-04-21 PROCEDURE — 71010 XR CHEST PORTABLE: CPT

## 2017-04-21 PROCEDURE — 99152 MOD SED SAME PHYS/QHP 5/>YRS: CPT | Performed by: PSYCHIATRY & NEUROLOGY

## 2017-04-21 RX ORDER — MAGNESIUM SULFATE 1 G/100ML
1 INJECTION INTRAVENOUS ONCE
Status: COMPLETED | OUTPATIENT
Start: 2017-04-21 | End: 2017-04-21

## 2017-04-21 RX ORDER — CIPROFLOXACIN 2 MG/ML
400 INJECTION, SOLUTION INTRAVENOUS EVERY 12 HOURS
Status: COMPLETED | OUTPATIENT
Start: 2017-04-21 | End: 2017-04-28

## 2017-04-21 RX ORDER — FENTANYL CITRATE 50 UG/ML
INJECTION, SOLUTION INTRAMUSCULAR; INTRAVENOUS
Status: COMPLETED | OUTPATIENT
Start: 2017-04-21 | End: 2017-04-21

## 2017-04-21 RX ORDER — ACETAMINOPHEN 325 MG/1
650 TABLET ORAL EVERY 4 HOURS PRN
Status: CANCELLED | OUTPATIENT
Start: 2017-04-21

## 2017-04-21 RX ORDER — ALBUMIN (HUMAN) 12.5 G/50ML
25 SOLUTION INTRAVENOUS ONCE
Status: COMPLETED | OUTPATIENT
Start: 2017-04-21 | End: 2017-04-21

## 2017-04-21 RX ORDER — VECURONIUM BROMIDE 1 MG/ML
INJECTION, POWDER, LYOPHILIZED, FOR SOLUTION INTRAVENOUS
Status: DISCONTINUED
Start: 2017-04-21 | End: 2017-04-21 | Stop reason: WASHOUT

## 2017-04-21 RX ORDER — IODIXANOL 270 MG/ML
60 INJECTION, SOLUTION INTRAVASCULAR
Status: COMPLETED | OUTPATIENT
Start: 2017-04-21 | End: 2017-04-21

## 2017-04-21 RX ADMIN — NIMODIPINE 30 MG: 60 SOLUTION ORAL at 02:19

## 2017-04-21 RX ADMIN — MAGNESIUM SULFATE HEPTAHYDRATE: 500 INJECTION, SOLUTION INTRAMUSCULAR; INTRAVENOUS at 01:21

## 2017-04-21 RX ADMIN — NIMODIPINE 30 MG: 60 SOLUTION ORAL at 23:58

## 2017-04-21 RX ADMIN — MAGNESIUM SULFATE HEPTAHYDRATE: 500 INJECTION, SOLUTION INTRAMUSCULAR; INTRAVENOUS at 07:20

## 2017-04-21 RX ADMIN — NIMODIPINE 30 MG: 60 SOLUTION ORAL at 08:33

## 2017-04-21 RX ADMIN — ALBUMIN (HUMAN) 25 G: 0.25 INJECTION, SOLUTION INTRAVENOUS at 11:49

## 2017-04-21 RX ADMIN — ENOXAPARIN SODIUM 30 MG: 100 INJECTION SUBCUTANEOUS at 21:00

## 2017-04-21 RX ADMIN — MAGNESIUM SULFATE IN DEXTROSE 1 G: 10 INJECTION, SOLUTION INTRAVENOUS at 15:34

## 2017-04-21 RX ADMIN — NIMODIPINE 30 MG: 60 SOLUTION ORAL at 22:13

## 2017-04-21 RX ADMIN — CEFEPIME 2 G: 2 INJECTION, POWDER, FOR SOLUTION INTRAVENOUS at 13:14

## 2017-04-21 RX ADMIN — POTASSIUM CHLORIDE 40 MEQ: 40 SOLUTION ORAL at 08:33

## 2017-04-21 RX ADMIN — ACETAMINOPHEN 650 MG: 325 TABLET ORAL at 16:30

## 2017-04-21 RX ADMIN — FAMOTIDINE 20 MG: 10 INJECTION, SOLUTION INTRAVENOUS at 21:00

## 2017-04-21 RX ADMIN — MAGNESIUM SULFATE HEPTAHYDRATE: 500 INJECTION, SOLUTION INTRAMUSCULAR; INTRAVENOUS at 21:09

## 2017-04-21 RX ADMIN — MAGNESIUM SULFATE HEPTAHYDRATE: 500 INJECTION, SOLUTION INTRAMUSCULAR; INTRAVENOUS at 14:00

## 2017-04-21 RX ADMIN — FAMOTIDINE 20 MG: 10 INJECTION, SOLUTION INTRAVENOUS at 08:33

## 2017-04-21 RX ADMIN — Medication 18 MCG/MIN: at 17:58

## 2017-04-21 RX ADMIN — Medication 10 ML: at 08:33

## 2017-04-21 RX ADMIN — NIMODIPINE 30 MG: 60 SOLUTION ORAL at 20:04

## 2017-04-21 RX ADMIN — CIPROFLOXACIN 400 MG: 2 INJECTION, SOLUTION INTRAVENOUS at 17:54

## 2017-04-21 RX ADMIN — SIMVASTATIN 80 MG: 40 TABLET, FILM COATED ORAL at 21:01

## 2017-04-21 RX ADMIN — FENTANYL CITRATE: 50 INJECTION INTRAMUSCULAR; INTRAVENOUS at 12:26

## 2017-04-21 RX ADMIN — FENTANYL CITRATE 25 MCG: 50 INJECTION INTRAMUSCULAR; INTRAVENOUS at 09:48

## 2017-04-21 RX ADMIN — LIDOCAINE HYDROCHLORIDE 2 ML: 40 INJECTION, SOLUTION RETROBULBAR; TOPICAL at 12:33

## 2017-04-21 RX ADMIN — LIDOCAINE HYDROCHLORIDE 4 ML: 40 INJECTION, SOLUTION RETROBULBAR; TOPICAL at 09:30

## 2017-04-21 RX ADMIN — ASPIRIN 81 MG: 81 TABLET, CHEWABLE ORAL at 14:10

## 2017-04-21 RX ADMIN — IODIXANOL 60 ML: 270 INJECTION, SOLUTION INTRAVASCULAR at 10:58

## 2017-04-21 RX ADMIN — LEVETIRACETAM 1000 MG: 500 INJECTION, SOLUTION, CONCENTRATE INTRAVENOUS at 08:33

## 2017-04-21 RX ADMIN — ONDANSETRON 4 MG: 2 INJECTION, SOLUTION INTRAMUSCULAR; INTRAVENOUS at 01:50

## 2017-04-21 RX ADMIN — MAGNESIUM SULFATE IN DEXTROSE 1 G: 10 INJECTION, SOLUTION INTRAVENOUS at 00:16

## 2017-04-21 RX ADMIN — ACETAMINOPHEN 650 MG: 325 TABLET ORAL at 22:53

## 2017-04-21 RX ADMIN — INSULIN LISPRO 1 UNITS: 100 INJECTION, SOLUTION INTRAVENOUS; SUBCUTANEOUS at 18:24

## 2017-04-21 RX ADMIN — NIMODIPINE 30 MG: 60 SOLUTION ORAL at 06:15

## 2017-04-21 RX ADMIN — NIMODIPINE 30 MG: 60 SOLUTION ORAL at 18:02

## 2017-04-21 RX ADMIN — MAGNESIUM SULFATE IN DEXTROSE 1 G: 10 INJECTION, SOLUTION INTRAVENOUS at 01:22

## 2017-04-21 RX ADMIN — NIMODIPINE 30 MG: 60 SOLUTION ORAL at 12:26

## 2017-04-21 RX ADMIN — LIDOCAINE HYDROCHLORIDE 2.5 ML: 40 INJECTION, SOLUTION RETROBULBAR; TOPICAL at 14:29

## 2017-04-21 RX ADMIN — FENTANYL CITRATE 50 MCG: 50 INJECTION INTRAMUSCULAR; INTRAVENOUS at 20:03

## 2017-04-21 RX ADMIN — LEVETIRACETAM 1000 MG: 500 INJECTION, SOLUTION, CONCENTRATE INTRAVENOUS at 21:00

## 2017-04-21 RX ADMIN — NIMODIPINE 30 MG: 60 SOLUTION ORAL at 16:29

## 2017-04-21 RX ADMIN — NIMODIPINE 30 MG: 60 SOLUTION ORAL at 14:10

## 2017-04-21 RX ADMIN — FENTANYL CITRATE 50 MCG: 50 INJECTION INTRAMUSCULAR; INTRAVENOUS at 16:20

## 2017-04-21 RX ADMIN — LIDOCAINE HYDROCHLORIDE 4 ML: 40 INJECTION, SOLUTION RETROBULBAR; TOPICAL at 01:34

## 2017-04-21 RX ADMIN — NIMODIPINE 30 MG: 60 SOLUTION ORAL at 04:13

## 2017-04-21 ASSESSMENT — PULMONARY FUNCTION TESTS
PIF_VALUE: 15
PIF_VALUE: 18
PIF_VALUE: 16
PIF_VALUE: 12
PIF_VALUE: 20
PIF_VALUE: 12
PIF_VALUE: 16
PIF_VALUE: 12
PIF_VALUE: 15
PIF_VALUE: 13
PIF_VALUE: 22
PIF_VALUE: 16

## 2017-04-21 ASSESSMENT — PAIN SCALES - GENERAL
PAINLEVEL_OUTOF10: 0

## 2017-04-22 ENCOUNTER — APPOINTMENT (OUTPATIENT)
Dept: CT IMAGING | Age: 57
DRG: 004 | End: 2017-04-22
Attending: NEUROLOGICAL SURGERY
Payer: MEDICARE

## 2017-04-22 LAB
ABSOLUTE EOS #: 0 K/UL (ref 0–0.4)
ABSOLUTE LYMPH #: 0.9 K/UL (ref 1–4.8)
ABSOLUTE MONO #: 1 K/UL (ref 0.1–1.2)
ANION GAP SERPL CALCULATED.3IONS-SCNC: 13 MMOL/L (ref 9–17)
BASOPHILS # BLD: 2 % (ref 0–2)
BASOPHILS ABSOLUTE: 0.1 K/UL (ref 0–0.2)
BUN BLDV-MCNC: 6 MG/DL (ref 6–20)
BUN/CREAT BLD: ABNORMAL (ref 9–20)
CALCIUM SERPL-MCNC: 8 MG/DL (ref 8.6–10.4)
CHLORIDE BLD-SCNC: 101 MMOL/L (ref 98–107)
CO2: 27 MMOL/L (ref 20–31)
CREAT SERPL-MCNC: 0.26 MG/DL (ref 0.5–0.9)
CULTURE: ABNORMAL
CULTURE: ABNORMAL
CULTURE: NORMAL
CULTURE: NORMAL
DIFFERENTIAL TYPE: ABNORMAL
DIRECT EXAM: ABNORMAL
DIRECT EXAM: NORMAL
EOSINOPHILS RELATIVE PERCENT: 0 % (ref 1–4)
FIO2: 30
GFR AFRICAN AMERICAN: >60 ML/MIN
GFR NON-AFRICAN AMERICAN: >60 ML/MIN
GFR SERPL CREATININE-BSD FRML MDRD: ABNORMAL ML/MIN/{1.73_M2}
GFR SERPL CREATININE-BSD FRML MDRD: ABNORMAL ML/MIN/{1.73_M2}
GLUCOSE BLD-MCNC: 157 MG/DL (ref 65–105)
GLUCOSE BLD-MCNC: 160 MG/DL (ref 65–105)
GLUCOSE BLD-MCNC: 167 MG/DL (ref 70–99)
GLUCOSE BLD-MCNC: 175 MG/DL (ref 65–105)
HCT VFR BLD CALC: 33.6 % (ref 36–46)
HEMOGLOBIN: 11.7 G/DL (ref 12–16)
LYMPHOCYTES # BLD: 10 % (ref 24–44)
Lab: ABNORMAL
Lab: NORMAL
MAGNESIUM: 2.2 MG/DL (ref 1.6–2.6)
MAGNESIUM: 2.4 MG/DL (ref 1.6–2.6)
MCH RBC QN AUTO: 32.1 PG (ref 26–34)
MCHC RBC AUTO-ENTMCNC: 34.9 G/DL (ref 31–37)
MCV RBC AUTO: 92 FL (ref 80–100)
MONOCYTES # BLD: 11 % (ref 2–11)
NEGATIVE BASE EXCESS, ART: ABNORMAL (ref 0–2)
O2 DEVICE/FLOW/%: ABNORMAL
PATIENT TEMP: 38.5
PDW BLD-RTO: 13.4 % (ref 12.5–15.4)
PLATELET # BLD: 305 K/UL (ref 140–450)
PLATELET ESTIMATE: ABNORMAL
PMV BLD AUTO: 6.5 FL (ref 6–12)
POC HCO3: 27.5 MMOL/L (ref 22–27)
POC O2 SATURATION: 98 %
POC PCO2 TEMP: ABNORMAL MM HG
POC PCO2: 36 MM HG (ref 32–45)
POC PH TEMP: ABNORMAL
POC PH: 7.49 (ref 7.35–7.45)
POC PO2 TEMP: ABNORMAL MM HG
POC PO2: 95 MM HG (ref 75–95)
POSITIVE BASE EXCESS, ART: 4 (ref 0–2)
POTASSIUM SERPL-SCNC: 3 MMOL/L (ref 3.7–5.3)
RBC # BLD: 3.65 M/UL (ref 4–5.2)
RBC # BLD: ABNORMAL 10*6/UL
SEG NEUTROPHILS: 77 % (ref 36–66)
SEGMENTED NEUTROPHILS ABSOLUTE COUNT: 7.1 K/UL (ref 1.8–7.7)
SODIUM BLD-SCNC: 141 MMOL/L (ref 135–144)
SPECIMEN DESCRIPTION: ABNORMAL
SPECIMEN DESCRIPTION: NORMAL
STATUS: ABNORMAL
STATUS: NORMAL
TCO2 (CALC), ART: 29 MM HG (ref 23–28)
WBC # BLD: 9.2 K/UL (ref 3.5–11)
WBC # BLD: ABNORMAL 10*3/UL

## 2017-04-22 PROCEDURE — 2500000003 HC RX 250 WO HCPCS: Performed by: EMERGENCY MEDICINE

## 2017-04-22 PROCEDURE — 36415 COLL VENOUS BLD VENIPUNCTURE: CPT

## 2017-04-22 PROCEDURE — S0028 INJECTION, FAMOTIDINE, 20 MG: HCPCS | Performed by: EMERGENCY MEDICINE

## 2017-04-22 PROCEDURE — 6360000002 HC RX W HCPCS: Performed by: EMERGENCY MEDICINE

## 2017-04-22 PROCEDURE — 87015 SPECIMEN INFECT AGNT CONCNTJ: CPT

## 2017-04-22 PROCEDURE — 80048 BASIC METABOLIC PNL TOTAL CA: CPT

## 2017-04-22 PROCEDURE — 85025 COMPLETE CBC W/AUTO DIFF WBC: CPT

## 2017-04-22 PROCEDURE — 94762 N-INVAS EAR/PLS OXIMTRY CONT: CPT

## 2017-04-22 PROCEDURE — 6370000000 HC RX 637 (ALT 250 FOR IP): Performed by: PSYCHIATRY & NEUROLOGY

## 2017-04-22 PROCEDURE — 87070 CULTURE OTHR SPECIMN AEROBIC: CPT

## 2017-04-22 PROCEDURE — 6370000000 HC RX 637 (ALT 250 FOR IP): Performed by: EMERGENCY MEDICINE

## 2017-04-22 PROCEDURE — 82947 ASSAY GLUCOSE BLOOD QUANT: CPT

## 2017-04-22 PROCEDURE — 36600 WITHDRAWAL OF ARTERIAL BLOOD: CPT

## 2017-04-22 PROCEDURE — 82803 BLOOD GASES ANY COMBINATION: CPT

## 2017-04-22 PROCEDURE — 87205 SMEAR GRAM STAIN: CPT

## 2017-04-22 PROCEDURE — 6360000002 HC RX W HCPCS: Performed by: NEUROLOGICAL SURGERY

## 2017-04-22 PROCEDURE — 83735 ASSAY OF MAGNESIUM: CPT

## 2017-04-22 PROCEDURE — 2000000003 HC NEURO ICU R&B

## 2017-04-22 PROCEDURE — 99291 CRITICAL CARE FIRST HOUR: CPT | Performed by: PSYCHIATRY & NEUROLOGY

## 2017-04-22 PROCEDURE — 93886 INTRACRANIAL COMPLETE STUDY: CPT

## 2017-04-22 PROCEDURE — 2580000003 HC RX 258: Performed by: EMERGENCY MEDICINE

## 2017-04-22 PROCEDURE — 99291 CRITICAL CARE FIRST HOUR: CPT | Performed by: INTERNAL MEDICINE

## 2017-04-22 PROCEDURE — 99232 SBSQ HOSP IP/OBS MODERATE 35: CPT | Performed by: PSYCHIATRY & NEUROLOGY

## 2017-04-22 PROCEDURE — 6360000002 HC RX W HCPCS: Performed by: STUDENT IN AN ORGANIZED HEALTH CARE EDUCATION/TRAINING PROGRAM

## 2017-04-22 PROCEDURE — 94681 O2 UPTK CO2 OUTP % O2 XTRC: CPT

## 2017-04-22 PROCEDURE — 99232 SBSQ HOSP IP/OBS MODERATE 35: CPT | Performed by: NEUROLOGICAL SURGERY

## 2017-04-22 PROCEDURE — 6370000000 HC RX 637 (ALT 250 FOR IP): Performed by: NEUROLOGICAL SURGERY

## 2017-04-22 PROCEDURE — 2580000003 HC RX 258: Performed by: NEUROLOGICAL SURGERY

## 2017-04-22 PROCEDURE — 70450 CT HEAD/BRAIN W/O DYE: CPT

## 2017-04-22 PROCEDURE — 94003 VENT MGMT INPAT SUBQ DAY: CPT

## 2017-04-22 RX ORDER — ACETAMINOPHEN 325 MG/1
TABLET ORAL
Status: DISPENSED
Start: 2017-04-22 | End: 2017-04-22

## 2017-04-22 RX ADMIN — SIMVASTATIN 80 MG: 40 TABLET, FILM COATED ORAL at 20:10

## 2017-04-22 RX ADMIN — NIMODIPINE 30 MG: 60 SOLUTION ORAL at 10:03

## 2017-04-22 RX ADMIN — ONDANSETRON 4 MG: 2 INJECTION, SOLUTION INTRAMUSCULAR; INTRAVENOUS at 16:32

## 2017-04-22 RX ADMIN — NIMODIPINE 30 MG: 60 SOLUTION ORAL at 03:57

## 2017-04-22 RX ADMIN — Medication 10 ML: at 10:03

## 2017-04-22 RX ADMIN — LEVETIRACETAM 1000 MG: 500 INJECTION, SOLUTION, CONCENTRATE INTRAVENOUS at 20:40

## 2017-04-22 RX ADMIN — CIPROFLOXACIN 400 MG: 2 INJECTION, SOLUTION INTRAVENOUS at 16:00

## 2017-04-22 RX ADMIN — MAGNESIUM SULFATE HEPTAHYDRATE: 500 INJECTION, SOLUTION INTRAMUSCULAR; INTRAVENOUS at 18:18

## 2017-04-22 RX ADMIN — Medication 10 ML: at 20:11

## 2017-04-22 RX ADMIN — Medication 10 ML: at 08:15

## 2017-04-22 RX ADMIN — FAMOTIDINE 20 MG: 10 INJECTION, SOLUTION INTRAVENOUS at 20:11

## 2017-04-22 RX ADMIN — CEFAZOLIN SODIUM 1 G: 1 INJECTION, SOLUTION INTRAVENOUS at 14:06

## 2017-04-22 RX ADMIN — ENOXAPARIN SODIUM 30 MG: 100 INJECTION SUBCUTANEOUS at 20:21

## 2017-04-22 RX ADMIN — ACETAMINOPHEN 650 MG: 325 TABLET ORAL at 04:10

## 2017-04-22 RX ADMIN — MAGNESIUM SULFATE IN DEXTROSE 1 G: 10 INJECTION, SOLUTION INTRAVENOUS at 10:03

## 2017-04-22 RX ADMIN — FAMOTIDINE 20 MG: 10 INJECTION, SOLUTION INTRAVENOUS at 08:11

## 2017-04-22 RX ADMIN — MAGNESIUM SULFATE IN DEXTROSE 1 G: 10 INJECTION, SOLUTION INTRAVENOUS at 13:04

## 2017-04-22 RX ADMIN — FENTANYL CITRATE 50 MCG: 50 INJECTION INTRAMUSCULAR; INTRAVENOUS at 10:58

## 2017-04-22 RX ADMIN — MAGNESIUM SULFATE HEPTAHYDRATE: 500 INJECTION, SOLUTION INTRAMUSCULAR; INTRAVENOUS at 03:56

## 2017-04-22 RX ADMIN — FENTANYL CITRATE 50 MCG: 50 INJECTION INTRAMUSCULAR; INTRAVENOUS at 19:33

## 2017-04-22 RX ADMIN — INSULIN LISPRO 1 UNITS: 100 INJECTION, SOLUTION INTRAVENOUS; SUBCUTANEOUS at 14:06

## 2017-04-22 RX ADMIN — ENOXAPARIN SODIUM 30 MG: 100 INJECTION SUBCUTANEOUS at 08:11

## 2017-04-22 RX ADMIN — LEVETIRACETAM 1000 MG: 500 INJECTION, SOLUTION, CONCENTRATE INTRAVENOUS at 08:11

## 2017-04-22 RX ADMIN — INSULIN LISPRO 1 UNITS: 100 INJECTION, SOLUTION INTRAVENOUS; SUBCUTANEOUS at 06:13

## 2017-04-22 RX ADMIN — NIMODIPINE 30 MG: 60 SOLUTION ORAL at 18:18

## 2017-04-22 RX ADMIN — FENTANYL CITRATE 50 MCG: 50 INJECTION INTRAMUSCULAR; INTRAVENOUS at 08:12

## 2017-04-22 RX ADMIN — NIMODIPINE 30 MG: 60 SOLUTION ORAL at 22:06

## 2017-04-22 RX ADMIN — FENTANYL CITRATE 50 MCG: 50 INJECTION INTRAMUSCULAR; INTRAVENOUS at 16:00

## 2017-04-22 RX ADMIN — NIMODIPINE 30 MG: 60 SOLUTION ORAL at 13:02

## 2017-04-22 RX ADMIN — ASPIRIN 81 MG: 81 TABLET, CHEWABLE ORAL at 10:02

## 2017-04-22 RX ADMIN — NIMODIPINE 30 MG: 60 SOLUTION ORAL at 20:11

## 2017-04-22 RX ADMIN — MAGNESIUM SULFATE IN DEXTROSE 1 G: 10 INJECTION, SOLUTION INTRAVENOUS at 14:06

## 2017-04-22 RX ADMIN — NIMODIPINE 30 MG: 60 SOLUTION ORAL at 06:10

## 2017-04-22 RX ADMIN — NIMODIPINE 30 MG: 60 SOLUTION ORAL at 14:16

## 2017-04-22 RX ADMIN — Medication 20 MCG/MIN: at 22:14

## 2017-04-22 RX ADMIN — CIPROFLOXACIN 400 MG: 2 INJECTION, SOLUTION INTRAVENOUS at 04:00

## 2017-04-22 RX ADMIN — FENTANYL CITRATE 50 MCG: 50 INJECTION INTRAMUSCULAR; INTRAVENOUS at 22:06

## 2017-04-22 RX ADMIN — CEFAZOLIN SODIUM 1 G: 1 INJECTION, SOLUTION INTRAVENOUS at 22:14

## 2017-04-22 RX ADMIN — NIMODIPINE 30 MG: 60 SOLUTION ORAL at 02:10

## 2017-04-22 RX ADMIN — NIMODIPINE 30 MG: 60 SOLUTION ORAL at 08:20

## 2017-04-22 RX ADMIN — INSULIN LISPRO 1 UNITS: 100 INJECTION, SOLUTION INTRAVENOUS; SUBCUTANEOUS at 18:34

## 2017-04-22 RX ADMIN — Medication 15 MCG/MIN: at 10:02

## 2017-04-22 RX ADMIN — ACETAMINOPHEN 650 MG: 325 TABLET ORAL at 10:00

## 2017-04-22 ASSESSMENT — PAIN SCALES - WONG BAKER
WONGBAKER_NUMERICALRESPONSE: 2

## 2017-04-22 ASSESSMENT — PULMONARY FUNCTION TESTS
PIF_VALUE: 18
PIF_VALUE: 16
PIF_VALUE: 7
PIF_VALUE: 8
PIF_VALUE: 11
PIF_VALUE: 7
PIF_VALUE: 11
PIF_VALUE: 7
PIF_VALUE: 13
PIF_VALUE: 12
PIF_VALUE: 16
PIF_VALUE: 15
PIF_VALUE: 12
PIF_VALUE: 13
PIF_VALUE: 9
PIF_VALUE: 8
PIF_VALUE: 12
PIF_VALUE: 8
PIF_VALUE: 10
PIF_VALUE: 13
PIF_VALUE: 14
PIF_VALUE: 11
PIF_VALUE: 12
PIF_VALUE: 11
PIF_VALUE: 11
PIF_VALUE: 8
PIF_VALUE: 7
PIF_VALUE: 9
PIF_VALUE: 6
PIF_VALUE: 8
PIF_VALUE: 15
PIF_VALUE: 10
PIF_VALUE: 13
PIF_VALUE: 12
PIF_VALUE: 15
PIF_VALUE: 12
PIF_VALUE: 15

## 2017-04-22 ASSESSMENT — PAIN SCALES - GENERAL
PAINLEVEL_OUTOF10: 2
PAINLEVEL_OUTOF10: 7
PAINLEVEL_OUTOF10: 2
PAINLEVEL_OUTOF10: 2
PAINLEVEL_OUTOF10: 7
PAINLEVEL_OUTOF10: 7
PAINLEVEL_OUTOF10: 8
PAINLEVEL_OUTOF10: 2
PAINLEVEL_OUTOF10: 2

## 2017-04-23 ENCOUNTER — APPOINTMENT (OUTPATIENT)
Dept: MRI IMAGING | Age: 57
DRG: 004 | End: 2017-04-23
Attending: NEUROLOGICAL SURGERY
Payer: MEDICARE

## 2017-04-23 LAB
ABSOLUTE EOS #: 0 K/UL (ref 0–0.4)
ABSOLUTE LYMPH #: 0.83 K/UL (ref 1–4.8)
ABSOLUTE MONO #: 0.83 K/UL (ref 0.1–0.8)
ANION GAP SERPL CALCULATED.3IONS-SCNC: 15 MMOL/L (ref 9–17)
BASOPHILS # BLD: 0 % (ref 0–2)
BASOPHILS ABSOLUTE: 0 K/UL (ref 0–0.2)
BUN BLDV-MCNC: 5 MG/DL (ref 6–20)
BUN/CREAT BLD: ABNORMAL (ref 9–20)
CALCIUM SERPL-MCNC: 7.7 MG/DL (ref 8.6–10.4)
CHLORIDE BLD-SCNC: 97 MMOL/L (ref 98–107)
CO2: 26 MMOL/L (ref 20–31)
CREAT SERPL-MCNC: 0.28 MG/DL (ref 0.5–0.9)
DIFFERENTIAL TYPE: ABNORMAL
EOSINOPHILS RELATIVE PERCENT: 0 % (ref 1–4)
FIO2: 30
GFR AFRICAN AMERICAN: >60 ML/MIN
GFR NON-AFRICAN AMERICAN: >60 ML/MIN
GFR SERPL CREATININE-BSD FRML MDRD: ABNORMAL ML/MIN/{1.73_M2}
GFR SERPL CREATININE-BSD FRML MDRD: ABNORMAL ML/MIN/{1.73_M2}
GLUCOSE BLD-MCNC: 182 MG/DL (ref 65–105)
GLUCOSE BLD-MCNC: 184 MG/DL (ref 65–105)
GLUCOSE BLD-MCNC: 197 MG/DL (ref 70–99)
GLUCOSE BLD-MCNC: 199 MG/DL (ref 65–105)
GLUCOSE BLD-MCNC: 218 MG/DL (ref 65–105)
HCT VFR BLD CALC: 34.8 % (ref 36–46)
HEMOGLOBIN: 11.6 G/DL (ref 12–16)
LYMPHOCYTES # BLD: 8 % (ref 24–44)
MAGNESIUM: 2.3 MG/DL (ref 1.6–2.6)
MAGNESIUM: 2.6 MG/DL (ref 1.6–2.6)
MCH RBC QN AUTO: 31.1 PG (ref 26–34)
MCHC RBC AUTO-ENTMCNC: 33.3 G/DL (ref 31–37)
MCV RBC AUTO: 93.4 FL (ref 80–100)
MONOCYTES # BLD: 8 % (ref 1–7)
MORPHOLOGY: NORMAL
NEGATIVE BASE EXCESS, ART: ABNORMAL (ref 0–2)
O2 DEVICE/FLOW/%: ABNORMAL
PATIENT TEMP: 99.9
PDW BLD-RTO: 13.4 % (ref 12.5–15.4)
PLATELET # BLD: ABNORMAL K/UL (ref 140–450)
PLATELET ESTIMATE: ABNORMAL
PMV BLD AUTO: ABNORMAL FL (ref 6–12)
POC HCO3: 29.7 MMOL/L (ref 22–27)
POC O2 SATURATION: 99 %
POC PCO2 TEMP: ABNORMAL MM HG
POC PCO2: 38 MM HG (ref 32–45)
POC PH TEMP: ABNORMAL
POC PH: 7.5 (ref 7.35–7.45)
POC PO2 TEMP: ABNORMAL MM HG
POC PO2: 117 MM HG (ref 75–95)
POSITIVE BASE EXCESS, ART: 7 (ref 0–2)
POTASSIUM SERPL-SCNC: 2.8 MMOL/L (ref 3.7–5.3)
RBC # BLD: 3.72 M/UL (ref 4–5.2)
RBC # BLD: ABNORMAL 10*6/UL
SEG NEUTROPHILS: 84 % (ref 36–66)
SEGMENTED NEUTROPHILS ABSOLUTE COUNT: 8.74 K/UL (ref 1.8–7.7)
SODIUM BLD-SCNC: 138 MMOL/L (ref 135–144)
TCO2 (CALC), ART: 31 MM HG (ref 23–28)
WBC # BLD: 10.4 K/UL (ref 3.5–11)
WBC # BLD: ABNORMAL 10*3/UL

## 2017-04-23 PROCEDURE — 93886 INTRACRANIAL COMPLETE STUDY: CPT

## 2017-04-23 PROCEDURE — 6360000002 HC RX W HCPCS: Performed by: STUDENT IN AN ORGANIZED HEALTH CARE EDUCATION/TRAINING PROGRAM

## 2017-04-23 PROCEDURE — S0028 INJECTION, FAMOTIDINE, 20 MG: HCPCS | Performed by: EMERGENCY MEDICINE

## 2017-04-23 PROCEDURE — 2500000003 HC RX 250 WO HCPCS: Performed by: EMERGENCY MEDICINE

## 2017-04-23 PROCEDURE — 6360000002 HC RX W HCPCS: Performed by: EMERGENCY MEDICINE

## 2017-04-23 PROCEDURE — 36415 COLL VENOUS BLD VENIPUNCTURE: CPT

## 2017-04-23 PROCEDURE — 94762 N-INVAS EAR/PLS OXIMTRY CONT: CPT

## 2017-04-23 PROCEDURE — 87205 SMEAR GRAM STAIN: CPT

## 2017-04-23 PROCEDURE — 94003 VENT MGMT INPAT SUBQ DAY: CPT

## 2017-04-23 PROCEDURE — 85025 COMPLETE CBC W/AUTO DIFF WBC: CPT

## 2017-04-23 PROCEDURE — 2000000003 HC NEURO ICU R&B

## 2017-04-23 PROCEDURE — 6370000000 HC RX 637 (ALT 250 FOR IP): Performed by: EMERGENCY MEDICINE

## 2017-04-23 PROCEDURE — 99232 SBSQ HOSP IP/OBS MODERATE 35: CPT | Performed by: PSYCHIATRY & NEUROLOGY

## 2017-04-23 PROCEDURE — 99291 CRITICAL CARE FIRST HOUR: CPT | Performed by: INTERNAL MEDICINE

## 2017-04-23 PROCEDURE — 2500000003 HC RX 250 WO HCPCS: Performed by: INTERNAL MEDICINE

## 2017-04-23 PROCEDURE — 36600 WITHDRAWAL OF ARTERIAL BLOOD: CPT

## 2017-04-23 PROCEDURE — 2580000003 HC RX 258: Performed by: EMERGENCY MEDICINE

## 2017-04-23 PROCEDURE — 6370000000 HC RX 637 (ALT 250 FOR IP): Performed by: PSYCHIATRY & NEUROLOGY

## 2017-04-23 PROCEDURE — 80048 BASIC METABOLIC PNL TOTAL CA: CPT

## 2017-04-23 PROCEDURE — 6370000000 HC RX 637 (ALT 250 FOR IP): Performed by: NEUROLOGICAL SURGERY

## 2017-04-23 PROCEDURE — 94770 HC ETCO2 MONITOR DAILY: CPT

## 2017-04-23 PROCEDURE — 6360000002 HC RX W HCPCS: Performed by: NEUROLOGICAL SURGERY

## 2017-04-23 PROCEDURE — 82803 BLOOD GASES ANY COMBINATION: CPT

## 2017-04-23 PROCEDURE — 82947 ASSAY GLUCOSE BLOOD QUANT: CPT

## 2017-04-23 PROCEDURE — 87070 CULTURE OTHR SPECIMN AEROBIC: CPT

## 2017-04-23 PROCEDURE — 70551 MRI BRAIN STEM W/O DYE: CPT

## 2017-04-23 PROCEDURE — 2580000003 HC RX 258: Performed by: NEUROLOGICAL SURGERY

## 2017-04-23 PROCEDURE — 94640 AIRWAY INHALATION TREATMENT: CPT

## 2017-04-23 PROCEDURE — 99291 CRITICAL CARE FIRST HOUR: CPT | Performed by: PSYCHIATRY & NEUROLOGY

## 2017-04-23 PROCEDURE — 99232 SBSQ HOSP IP/OBS MODERATE 35: CPT | Performed by: NEUROLOGICAL SURGERY

## 2017-04-23 PROCEDURE — 6370000000 HC RX 637 (ALT 250 FOR IP): Performed by: INTERNAL MEDICINE

## 2017-04-23 PROCEDURE — 83735 ASSAY OF MAGNESIUM: CPT

## 2017-04-23 RX ORDER — POTASSIUM CHLORIDE 20MEQ/15ML
40 LIQUID (ML) ORAL ONCE
Status: COMPLETED | OUTPATIENT
Start: 2017-04-23 | End: 2017-04-23

## 2017-04-23 RX ADMIN — ENOXAPARIN SODIUM 30 MG: 100 INJECTION SUBCUTANEOUS at 20:23

## 2017-04-23 RX ADMIN — ACETAMINOPHEN 650 MG: 325 TABLET ORAL at 20:44

## 2017-04-23 RX ADMIN — MAGNESIUM SULFATE IN DEXTROSE 1 G: 10 INJECTION, SOLUTION INTRAVENOUS at 00:36

## 2017-04-23 RX ADMIN — MAGNESIUM SULFATE HEPTAHYDRATE: 500 INJECTION, SOLUTION INTRAMUSCULAR; INTRAVENOUS at 21:11

## 2017-04-23 RX ADMIN — NIMODIPINE 30 MG: 60 SOLUTION ORAL at 14:04

## 2017-04-23 RX ADMIN — HYDROMORPHONE HYDROCHLORIDE 0.5 MG: 1 INJECTION, SOLUTION INTRAMUSCULAR; INTRAVENOUS; SUBCUTANEOUS at 20:40

## 2017-04-23 RX ADMIN — NIMODIPINE 30 MG: 60 SOLUTION ORAL at 04:26

## 2017-04-23 RX ADMIN — MAGNESIUM SULFATE IN DEXTROSE 1 G: 10 INJECTION, SOLUTION INTRAVENOUS at 10:38

## 2017-04-23 RX ADMIN — NIMODIPINE 30 MG: 60 SOLUTION ORAL at 00:15

## 2017-04-23 RX ADMIN — POTASSIUM CHLORIDE 40 MEQ: 40 SOLUTION ORAL at 12:43

## 2017-04-23 RX ADMIN — Medication 10 ML: at 13:20

## 2017-04-23 RX ADMIN — INSULIN LISPRO 2 UNITS: 100 INJECTION, SOLUTION INTRAVENOUS; SUBCUTANEOUS at 05:55

## 2017-04-23 RX ADMIN — INSULIN LISPRO 1 UNITS: 100 INJECTION, SOLUTION INTRAVENOUS; SUBCUTANEOUS at 17:51

## 2017-04-23 RX ADMIN — INSULIN LISPRO 1 UNITS: 100 INJECTION, SOLUTION INTRAVENOUS; SUBCUTANEOUS at 00:16

## 2017-04-23 RX ADMIN — MAGNESIUM SULFATE IN DEXTROSE 1 G: 10 INJECTION, SOLUTION INTRAVENOUS at 01:37

## 2017-04-23 RX ADMIN — SODIUM CHLORIDE: 9 INJECTION, SOLUTION INTRAVENOUS at 16:37

## 2017-04-23 RX ADMIN — POTASSIUM CHLORIDE 40 MEQ: 40 SOLUTION ORAL at 08:17

## 2017-04-23 RX ADMIN — MAGNESIUM SULFATE HEPTAHYDRATE: 500 INJECTION, SOLUTION INTRAMUSCULAR; INTRAVENOUS at 06:51

## 2017-04-23 RX ADMIN — MAGNESIUM SULFATE HEPTAHYDRATE: 500 INJECTION, SOLUTION INTRAMUSCULAR; INTRAVENOUS at 14:11

## 2017-04-23 RX ADMIN — NIMODIPINE 30 MG: 60 SOLUTION ORAL at 10:38

## 2017-04-23 RX ADMIN — NIMODIPINE 30 MG: 60 SOLUTION ORAL at 02:33

## 2017-04-23 RX ADMIN — Medication 24 MCG/MIN: at 12:44

## 2017-04-23 RX ADMIN — CEFAZOLIN SODIUM 1 G: 1 INJECTION, SOLUTION INTRAVENOUS at 05:46

## 2017-04-23 RX ADMIN — INSULIN LISPRO 1 UNITS: 100 INJECTION, SOLUTION INTRAVENOUS; SUBCUTANEOUS at 12:53

## 2017-04-23 RX ADMIN — ACETAMINOPHEN 650 MG: 325 TABLET ORAL at 08:20

## 2017-04-23 RX ADMIN — CIPROFLOXACIN 400 MG: 2 INJECTION, SOLUTION INTRAVENOUS at 05:02

## 2017-04-23 RX ADMIN — ASPIRIN 81 MG: 81 TABLET, CHEWABLE ORAL at 08:19

## 2017-04-23 RX ADMIN — MAGNESIUM SULFATE HEPTAHYDRATE: 500 INJECTION, SOLUTION INTRAMUSCULAR; INTRAVENOUS at 00:40

## 2017-04-23 RX ADMIN — POTASSIUM CHLORIDE 10 MEQ: 7.46 INJECTION, SOLUTION INTRAVENOUS at 10:38

## 2017-04-23 RX ADMIN — CEFAZOLIN SODIUM 1 G: 1 INJECTION, SOLUTION INTRAVENOUS at 14:04

## 2017-04-23 RX ADMIN — NIMODIPINE 30 MG: 60 SOLUTION ORAL at 17:52

## 2017-04-23 RX ADMIN — MAGNESIUM SULFATE IN DEXTROSE 1 G: 10 INJECTION, SOLUTION INTRAVENOUS at 02:33

## 2017-04-23 RX ADMIN — MAGNESIUM SULFATE IN DEXTROSE 1 G: 10 INJECTION, SOLUTION INTRAVENOUS at 11:45

## 2017-04-23 RX ADMIN — CEFAZOLIN SODIUM 1 G: 1 INJECTION, SOLUTION INTRAVENOUS at 22:21

## 2017-04-23 RX ADMIN — LEVETIRACETAM 1000 MG: 500 INJECTION, SOLUTION, CONCENTRATE INTRAVENOUS at 08:18

## 2017-04-23 RX ADMIN — NIMODIPINE 30 MG: 60 SOLUTION ORAL at 16:26

## 2017-04-23 RX ADMIN — POTASSIUM CHLORIDE 10 MEQ: 7.46 INJECTION, SOLUTION INTRAVENOUS at 08:20

## 2017-04-23 RX ADMIN — NIMODIPINE 30 MG: 60 SOLUTION ORAL at 05:46

## 2017-04-23 RX ADMIN — FAMOTIDINE 20 MG: 10 INJECTION, SOLUTION INTRAVENOUS at 08:18

## 2017-04-23 RX ADMIN — SIMVASTATIN 80 MG: 40 TABLET, FILM COATED ORAL at 20:23

## 2017-04-23 RX ADMIN — NIMODIPINE 30 MG: 60 SOLUTION ORAL at 12:43

## 2017-04-23 RX ADMIN — NIMODIPINE 30 MG: 60 SOLUTION ORAL at 20:23

## 2017-04-23 RX ADMIN — Medication 10 ML: at 13:19

## 2017-04-23 RX ADMIN — CIPROFLOXACIN 400 MG: 2 INJECTION, SOLUTION INTRAVENOUS at 16:26

## 2017-04-23 RX ADMIN — FAMOTIDINE 20 MG: 10 INJECTION, SOLUTION INTRAVENOUS at 20:22

## 2017-04-23 RX ADMIN — NIMODIPINE 30 MG: 60 SOLUTION ORAL at 22:18

## 2017-04-23 RX ADMIN — MAGNESIUM SULFATE IN DEXTROSE 1 G: 10 INJECTION, SOLUTION INTRAVENOUS at 12:51

## 2017-04-23 RX ADMIN — LIDOCAINE HYDROCHLORIDE 4 ML: 40 INJECTION, SOLUTION RETROBULBAR; TOPICAL at 00:47

## 2017-04-23 RX ADMIN — NIMODIPINE 30 MG: 60 SOLUTION ORAL at 08:18

## 2017-04-23 RX ADMIN — LEVETIRACETAM 1000 MG: 500 INJECTION, SOLUTION, CONCENTRATE INTRAVENOUS at 20:23

## 2017-04-23 RX ADMIN — ENOXAPARIN SODIUM 30 MG: 100 INJECTION SUBCUTANEOUS at 08:18

## 2017-04-23 ASSESSMENT — PULMONARY FUNCTION TESTS
PIF_VALUE: 11
PIF_VALUE: 14
PIF_VALUE: 13
PIF_VALUE: 12
PIF_VALUE: 13
PIF_VALUE: 17
PIF_VALUE: 12
PIF_VALUE: 13
PIF_VALUE: 12
PIF_VALUE: 10
PIF_VALUE: 15
PIF_VALUE: 9
PIF_VALUE: 12
PIF_VALUE: 12
PIF_VALUE: 13
PIF_VALUE: 12
PIF_VALUE: 12
PIF_VALUE: 7

## 2017-04-23 ASSESSMENT — PAIN SCALES - GENERAL: PAINLEVEL_OUTOF10: 7

## 2017-04-24 LAB
ABSOLUTE EOS #: 0.1 K/UL (ref 0–0.4)
ABSOLUTE LYMPH #: 1.5 K/UL (ref 1–4.8)
ABSOLUTE MONO #: 1.5 K/UL (ref 0.1–1.2)
ANION GAP SERPL CALCULATED.3IONS-SCNC: 15 MMOL/L (ref 9–17)
APPEARANCE CSF: ABNORMAL
BASOPHILS # BLD: 1 %
BASOPHILS ABSOLUTE: 0.1 K/UL (ref 0–0.2)
BUN BLDV-MCNC: 7 MG/DL (ref 6–20)
BUN/CREAT BLD: ABNORMAL (ref 9–20)
CALCIUM SERPL-MCNC: 8.2 MG/DL (ref 8.6–10.4)
CHLORIDE BLD-SCNC: 97 MMOL/L (ref 98–107)
CO2: 27 MMOL/L (ref 20–31)
CREAT SERPL-MCNC: 0.29 MG/DL (ref 0.5–0.9)
DIFFERENTIAL TYPE: ABNORMAL
EOSINOPHILS RELATIVE PERCENT: 1 %
FIO2: 30
GFR AFRICAN AMERICAN: >60 ML/MIN
GFR NON-AFRICAN AMERICAN: >60 ML/MIN
GFR SERPL CREATININE-BSD FRML MDRD: ABNORMAL ML/MIN/{1.73_M2}
GFR SERPL CREATININE-BSD FRML MDRD: ABNORMAL ML/MIN/{1.73_M2}
GLUCOSE BLD-MCNC: 145 MG/DL (ref 65–105)
GLUCOSE BLD-MCNC: 182 MG/DL (ref 65–105)
GLUCOSE BLD-MCNC: 189 MG/DL (ref 70–99)
GLUCOSE BLD-MCNC: 197 MG/DL (ref 65–105)
GLUCOSE BLD-MCNC: 220 MG/DL (ref 65–105)
GLUCOSE, CSF: 107 MG/DL (ref 40–70)
HCT VFR BLD CALC: 34.3 % (ref 36–46)
HEMOGLOBIN: 11.7 G/DL (ref 12–16)
LYMPHOCYTES # BLD: 13 %
MAGNESIUM: 2.1 MG/DL (ref 1.6–2.6)
MAGNESIUM: 2.2 MG/DL (ref 1.6–2.6)
MAGNESIUM: 2.7 MG/DL (ref 1.6–2.6)
MCH RBC QN AUTO: 31.5 PG (ref 26–34)
MCHC RBC AUTO-ENTMCNC: 34 G/DL (ref 31–37)
MCV RBC AUTO: 92.7 FL (ref 80–100)
MONOCYTES # BLD: 14 %
NEGATIVE BASE EXCESS, ART: ABNORMAL (ref 0–2)
O2 DEVICE/FLOW/%: ABNORMAL
PATIENT TEMP: ABNORMAL
PDW BLD-RTO: 13.5 % (ref 12.5–15.4)
PLATELET # BLD: 336 K/UL (ref 140–450)
PLATELET ESTIMATE: ABNORMAL
PMV BLD AUTO: 6.3 FL (ref 6–12)
POC HCO3: 28.3 MMOL/L (ref 22–27)
POC O2 SATURATION: 99 %
POC PCO2 TEMP: ABNORMAL MM HG
POC PCO2: 42 MM HG (ref 32–45)
POC PH TEMP: ABNORMAL
POC PH: 7.43 (ref 7.35–7.45)
POC PO2 TEMP: ABNORMAL MM HG
POC PO2: 121 MM HG (ref 75–95)
POSITIVE BASE EXCESS, ART: 4 (ref 0–2)
POTASSIUM SERPL-SCNC: 2.9 MMOL/L (ref 3.7–5.3)
POTASSIUM SERPL-SCNC: 3.7 MMOL/L (ref 3.7–5.3)
RBC # BLD: 3.7 M/UL (ref 4–5.2)
RBC # BLD: ABNORMAL 10*6/UL
RBC CSF: 3900 /MM3
SEG NEUTROPHILS: 71 %
SEGMENTED NEUTROPHILS ABSOLUTE COUNT: 7.9 K/UL (ref 1.8–7.7)
SODIUM BLD-SCNC: 139 MMOL/L (ref 135–144)
SUPERNAT COLOR CSF: ABNORMAL
TCO2 (CALC), ART: 30 MM HG (ref 23–28)
TUBE NUMBER CSF: ABNORMAL
VOLUME CSF: 3
WBC # BLD: 11 K/UL (ref 3.5–11)
WBC # BLD: ABNORMAL 10*3/UL
WBC CSF: 4 /MM3
XANTHOCHROMIA: PRESENT

## 2017-04-24 PROCEDURE — 6360000002 HC RX W HCPCS: Performed by: EMERGENCY MEDICINE

## 2017-04-24 PROCEDURE — 89050 BODY FLUID CELL COUNT: CPT

## 2017-04-24 PROCEDURE — 83735 ASSAY OF MAGNESIUM: CPT

## 2017-04-24 PROCEDURE — 6370000000 HC RX 637 (ALT 250 FOR IP): Performed by: PSYCHIATRY & NEUROLOGY

## 2017-04-24 PROCEDURE — 6360000002 HC RX W HCPCS: Performed by: NEUROLOGICAL SURGERY

## 2017-04-24 PROCEDURE — 99291 CRITICAL CARE FIRST HOUR: CPT | Performed by: PSYCHIATRY & NEUROLOGY

## 2017-04-24 PROCEDURE — 36415 COLL VENOUS BLD VENIPUNCTURE: CPT

## 2017-04-24 PROCEDURE — 87205 SMEAR GRAM STAIN: CPT

## 2017-04-24 PROCEDURE — 6370000000 HC RX 637 (ALT 250 FOR IP): Performed by: EMERGENCY MEDICINE

## 2017-04-24 PROCEDURE — 36600 WITHDRAWAL OF ARTERIAL BLOOD: CPT

## 2017-04-24 PROCEDURE — 80048 BASIC METABOLIC PNL TOTAL CA: CPT

## 2017-04-24 PROCEDURE — 97110 THERAPEUTIC EXERCISES: CPT

## 2017-04-24 PROCEDURE — 82803 BLOOD GASES ANY COMBINATION: CPT

## 2017-04-24 PROCEDURE — 2000000003 HC NEURO ICU R&B

## 2017-04-24 PROCEDURE — 2500000003 HC RX 250 WO HCPCS: Performed by: EMERGENCY MEDICINE

## 2017-04-24 PROCEDURE — 6370000000 HC RX 637 (ALT 250 FOR IP): Performed by: NEUROLOGICAL SURGERY

## 2017-04-24 PROCEDURE — S0028 INJECTION, FAMOTIDINE, 20 MG: HCPCS | Performed by: EMERGENCY MEDICINE

## 2017-04-24 PROCEDURE — 87070 CULTURE OTHR SPECIMN AEROBIC: CPT

## 2017-04-24 PROCEDURE — 94762 N-INVAS EAR/PLS OXIMTRY CONT: CPT

## 2017-04-24 PROCEDURE — 2580000003 HC RX 258: Performed by: EMERGENCY MEDICINE

## 2017-04-24 PROCEDURE — 84132 ASSAY OF SERUM POTASSIUM: CPT

## 2017-04-24 PROCEDURE — 82945 GLUCOSE OTHER FLUID: CPT

## 2017-04-24 PROCEDURE — 87015 SPECIMEN INFECT AGNT CONCNTJ: CPT

## 2017-04-24 PROCEDURE — 82947 ASSAY GLUCOSE BLOOD QUANT: CPT

## 2017-04-24 PROCEDURE — 99291 CRITICAL CARE FIRST HOUR: CPT | Performed by: INTERNAL MEDICINE

## 2017-04-24 PROCEDURE — 93886 INTRACRANIAL COMPLETE STUDY: CPT

## 2017-04-24 PROCEDURE — 85025 COMPLETE CBC W/AUTO DIFF WBC: CPT

## 2017-04-24 PROCEDURE — 6360000002 HC RX W HCPCS: Performed by: STUDENT IN AN ORGANIZED HEALTH CARE EDUCATION/TRAINING PROGRAM

## 2017-04-24 PROCEDURE — 99232 SBSQ HOSP IP/OBS MODERATE 35: CPT | Performed by: NURSE PRACTITIONER

## 2017-04-24 RX ORDER — POTASSIUM CHLORIDE 29.8 MG/ML
20 INJECTION INTRAVENOUS PRN
Status: DISCONTINUED | OUTPATIENT
Start: 2017-04-24 | End: 2017-05-08 | Stop reason: HOSPADM

## 2017-04-24 RX ORDER — LORAZEPAM 2 MG/ML
1 INJECTION INTRAMUSCULAR
Status: COMPLETED | OUTPATIENT
Start: 2017-04-24 | End: 2017-04-24

## 2017-04-24 RX ADMIN — LORAZEPAM 1 MG: 2 INJECTION INTRAMUSCULAR; INTRAVENOUS at 00:40

## 2017-04-24 RX ADMIN — NIMODIPINE 30 MG: 60 SOLUTION ORAL at 08:01

## 2017-04-24 RX ADMIN — INSULIN LISPRO 1 UNITS: 100 INJECTION, SOLUTION INTRAVENOUS; SUBCUTANEOUS at 00:01

## 2017-04-24 RX ADMIN — POTASSIUM CHLORIDE 10 MEQ: 7.46 INJECTION, SOLUTION INTRAVENOUS at 08:25

## 2017-04-24 RX ADMIN — NIMODIPINE 30 MG: 60 SOLUTION ORAL at 22:05

## 2017-04-24 RX ADMIN — Medication 16 MCG/MIN: at 00:40

## 2017-04-24 RX ADMIN — NIMODIPINE 30 MG: 60 SOLUTION ORAL at 00:02

## 2017-04-24 RX ADMIN — LEVETIRACETAM 1000 MG: 500 INJECTION, SOLUTION, CONCENTRATE INTRAVENOUS at 08:01

## 2017-04-24 RX ADMIN — Medication 30 MCG/MIN: at 11:08

## 2017-04-24 RX ADMIN — NIMODIPINE 30 MG: 60 SOLUTION ORAL at 04:15

## 2017-04-24 RX ADMIN — CIPROFLOXACIN 400 MG: 2 INJECTION, SOLUTION INTRAVENOUS at 16:26

## 2017-04-24 RX ADMIN — MAGNESIUM SULFATE HEPTAHYDRATE: 500 INJECTION, SOLUTION INTRAMUSCULAR; INTRAVENOUS at 18:19

## 2017-04-24 RX ADMIN — HYDROMORPHONE HYDROCHLORIDE 0.5 MG: 1 INJECTION, SOLUTION INTRAMUSCULAR; INTRAVENOUS; SUBCUTANEOUS at 23:07

## 2017-04-24 RX ADMIN — SODIUM CHLORIDE: 9 INJECTION, SOLUTION INTRAVENOUS at 11:08

## 2017-04-24 RX ADMIN — NIMODIPINE 30 MG: 60 SOLUTION ORAL at 09:57

## 2017-04-24 RX ADMIN — NIMODIPINE 30 MG: 60 SOLUTION ORAL at 16:26

## 2017-04-24 RX ADMIN — INSULIN LISPRO 2 UNITS: 100 INJECTION, SOLUTION INTRAVENOUS; SUBCUTANEOUS at 12:25

## 2017-04-24 RX ADMIN — MAGNESIUM SULFATE IN DEXTROSE 1 G: 10 INJECTION, SOLUTION INTRAVENOUS at 08:53

## 2017-04-24 RX ADMIN — HYDROMORPHONE HYDROCHLORIDE 0.5 MG: 1 INJECTION, SOLUTION INTRAMUSCULAR; INTRAVENOUS; SUBCUTANEOUS at 03:02

## 2017-04-24 RX ADMIN — ACETAMINOPHEN 650 MG: 325 TABLET ORAL at 09:57

## 2017-04-24 RX ADMIN — ASPIRIN 81 MG: 81 TABLET, CHEWABLE ORAL at 08:00

## 2017-04-24 RX ADMIN — FENTANYL CITRATE 50 MCG: 50 INJECTION INTRAMUSCULAR; INTRAVENOUS at 08:38

## 2017-04-24 RX ADMIN — INSULIN LISPRO 1 UNITS: 100 INJECTION, SOLUTION INTRAVENOUS; SUBCUTANEOUS at 17:49

## 2017-04-24 RX ADMIN — NIMODIPINE 30 MG: 60 SOLUTION ORAL at 14:20

## 2017-04-24 RX ADMIN — ENOXAPARIN SODIUM 30 MG: 100 INJECTION SUBCUTANEOUS at 20:10

## 2017-04-24 RX ADMIN — POTASSIUM CHLORIDE 10 MEQ: 7.46 INJECTION, SOLUTION INTRAVENOUS at 09:52

## 2017-04-24 RX ADMIN — INSULIN LISPRO 1 UNITS: 100 INJECTION, SOLUTION INTRAVENOUS; SUBCUTANEOUS at 06:48

## 2017-04-24 RX ADMIN — NIMODIPINE 30 MG: 60 SOLUTION ORAL at 12:03

## 2017-04-24 RX ADMIN — NIMODIPINE 30 MG: 60 SOLUTION ORAL at 20:01

## 2017-04-24 RX ADMIN — CEFAZOLIN SODIUM 1 G: 1 INJECTION, SOLUTION INTRAVENOUS at 06:49

## 2017-04-24 RX ADMIN — NIMODIPINE 30 MG: 60 SOLUTION ORAL at 02:23

## 2017-04-24 RX ADMIN — MAGNESIUM SULFATE IN DEXTROSE 1 G: 10 INJECTION, SOLUTION INTRAVENOUS at 07:52

## 2017-04-24 RX ADMIN — LEVETIRACETAM 1000 MG: 500 INJECTION, SOLUTION, CONCENTRATE INTRAVENOUS at 20:48

## 2017-04-24 RX ADMIN — FAMOTIDINE 20 MG: 10 INJECTION, SOLUTION INTRAVENOUS at 08:00

## 2017-04-24 RX ADMIN — SIMVASTATIN 80 MG: 40 TABLET, FILM COATED ORAL at 20:10

## 2017-04-24 RX ADMIN — Medication 28 MCG/MIN: at 21:21

## 2017-04-24 RX ADMIN — FAMOTIDINE 20 MG: 10 INJECTION, SOLUTION INTRAVENOUS at 20:10

## 2017-04-24 RX ADMIN — ENOXAPARIN SODIUM 30 MG: 100 INJECTION SUBCUTANEOUS at 08:00

## 2017-04-24 RX ADMIN — NIMODIPINE 30 MG: 60 SOLUTION ORAL at 06:04

## 2017-04-24 RX ADMIN — MAGNESIUM SULFATE IN DEXTROSE 1 G: 10 INJECTION, SOLUTION INTRAVENOUS at 09:52

## 2017-04-24 RX ADMIN — DOCUSATE SODIUM 100 MG: 50 LIQUID ORAL at 08:01

## 2017-04-24 RX ADMIN — ACETAMINOPHEN 650 MG: 325 TABLET ORAL at 20:09

## 2017-04-24 RX ADMIN — CIPROFLOXACIN 400 MG: 2 INJECTION, SOLUTION INTRAVENOUS at 06:04

## 2017-04-24 RX ADMIN — NIMODIPINE 30 MG: 60 SOLUTION ORAL at 18:09

## 2017-04-24 RX ADMIN — POTASSIUM CHLORIDE 40 MEQ: 40 SOLUTION ORAL at 12:03

## 2017-04-24 ASSESSMENT — PAIN SCALES - WONG BAKER
WONGBAKER_NUMERICALRESPONSE: 0

## 2017-04-24 ASSESSMENT — PULMONARY FUNCTION TESTS
PIF_VALUE: 12
PIF_VALUE: 13
PIF_VALUE: 12
PIF_VALUE: 11
PIF_VALUE: 12
PIF_VALUE: 13
PIF_VALUE: 15
PIF_VALUE: 12
PIF_VALUE: 12

## 2017-04-25 ENCOUNTER — APPOINTMENT (OUTPATIENT)
Dept: CT IMAGING | Age: 57
DRG: 004 | End: 2017-04-25
Attending: NEUROLOGICAL SURGERY
Payer: MEDICARE

## 2017-04-25 LAB
ABSOLUTE EOS #: 0.1 K/UL (ref 0–0.4)
ABSOLUTE LYMPH #: 0.7 K/UL (ref 1–4.8)
ABSOLUTE MONO #: 1 K/UL (ref 0.1–1.2)
ANION GAP SERPL CALCULATED.3IONS-SCNC: 15 MMOL/L (ref 9–17)
BASOPHILS # BLD: 0 %
BASOPHILS ABSOLUTE: 0 K/UL (ref 0–0.2)
BUN BLDV-MCNC: 7 MG/DL (ref 6–20)
BUN/CREAT BLD: ABNORMAL (ref 9–20)
CALCIUM SERPL-MCNC: 8.6 MG/DL (ref 8.6–10.4)
CHLORIDE BLD-SCNC: 92 MMOL/L (ref 98–107)
CO2: 25 MMOL/L (ref 20–31)
CREAT SERPL-MCNC: 0.24 MG/DL (ref 0.5–0.9)
CULTURE: ABNORMAL
CULTURE: ABNORMAL
CULTURE: NORMAL
DIFFERENTIAL TYPE: ABNORMAL
DIRECT EXAM: ABNORMAL
EOSINOPHILS RELATIVE PERCENT: 1 %
FIO2: 30
GFR AFRICAN AMERICAN: >60 ML/MIN
GFR NON-AFRICAN AMERICAN: >60 ML/MIN
GFR SERPL CREATININE-BSD FRML MDRD: ABNORMAL ML/MIN/{1.73_M2}
GFR SERPL CREATININE-BSD FRML MDRD: ABNORMAL ML/MIN/{1.73_M2}
GLUCOSE BLD-MCNC: 155 MG/DL (ref 65–105)
GLUCOSE BLD-MCNC: 190 MG/DL (ref 65–105)
GLUCOSE BLD-MCNC: 198 MG/DL (ref 70–99)
GLUCOSE BLD-MCNC: 203 MG/DL (ref 65–105)
HCT VFR BLD CALC: 36.3 % (ref 36–46)
HEMOGLOBIN: 12.2 G/DL (ref 12–16)
LYMPHOCYTES # BLD: 5 %
Lab: ABNORMAL
Lab: NORMAL
Lab: NORMAL
MAGNESIUM: 2.1 MG/DL (ref 1.6–2.6)
MAGNESIUM: 2.3 MG/DL (ref 1.6–2.6)
MCH RBC QN AUTO: 31.2 PG (ref 26–34)
MCHC RBC AUTO-ENTMCNC: 33.7 G/DL (ref 31–37)
MCV RBC AUTO: 92.6 FL (ref 80–100)
MONOCYTES # BLD: 7 %
NEGATIVE BASE EXCESS, ART: ABNORMAL (ref 0–2)
O2 DEVICE/FLOW/%: ABNORMAL
PATIENT TEMP: ABNORMAL
PDW BLD-RTO: 13.8 % (ref 12.5–15.4)
PLATELET # BLD: 378 K/UL (ref 140–450)
PLATELET ESTIMATE: ABNORMAL
PMV BLD AUTO: 6.2 FL (ref 6–12)
POC HCO3: 28.5 MMOL/L (ref 22–27)
POC O2 SATURATION: 99 %
POC PCO2 TEMP: ABNORMAL MM HG
POC PCO2: 42 MM HG (ref 32–45)
POC PH TEMP: ABNORMAL
POC PH: 7.44 (ref 7.35–7.45)
POC PO2 TEMP: ABNORMAL MM HG
POC PO2: 117 MM HG (ref 75–95)
POSITIVE BASE EXCESS, ART: 4 (ref 0–2)
POTASSIUM SERPL-SCNC: 3.1 MMOL/L (ref 3.7–5.3)
RBC # BLD: 3.92 M/UL (ref 4–5.2)
RBC # BLD: ABNORMAL 10*6/UL
SEG NEUTROPHILS: 87 %
SEGMENTED NEUTROPHILS ABSOLUTE COUNT: 12.7 K/UL (ref 1.8–7.7)
SODIUM BLD-SCNC: 132 MMOL/L (ref 135–144)
SPECIMEN DESCRIPTION: ABNORMAL
SPECIMEN DESCRIPTION: NORMAL
SPECIMEN DESCRIPTION: NORMAL
STATUS: ABNORMAL
STATUS: NORMAL
STATUS: NORMAL
TCO2 (CALC), ART: 30 MM HG (ref 23–28)
WBC # BLD: 14.6 K/UL (ref 3.5–11)
WBC # BLD: ABNORMAL 10*3/UL

## 2017-04-25 PROCEDURE — 93886 INTRACRANIAL COMPLETE STUDY: CPT

## 2017-04-25 PROCEDURE — 6360000002 HC RX W HCPCS: Performed by: STUDENT IN AN ORGANIZED HEALTH CARE EDUCATION/TRAINING PROGRAM

## 2017-04-25 PROCEDURE — 70450 CT HEAD/BRAIN W/O DYE: CPT

## 2017-04-25 PROCEDURE — 6370000000 HC RX 637 (ALT 250 FOR IP): Performed by: EMERGENCY MEDICINE

## 2017-04-25 PROCEDURE — 6360000002 HC RX W HCPCS: Performed by: EMERGENCY MEDICINE

## 2017-04-25 PROCEDURE — 94770 HC ETCO2 MONITOR DAILY: CPT

## 2017-04-25 PROCEDURE — 99291 CRITICAL CARE FIRST HOUR: CPT | Performed by: PSYCHIATRY & NEUROLOGY

## 2017-04-25 PROCEDURE — 99291 CRITICAL CARE FIRST HOUR: CPT | Performed by: INTERNAL MEDICINE

## 2017-04-25 PROCEDURE — 2580000003 HC RX 258: Performed by: EMERGENCY MEDICINE

## 2017-04-25 PROCEDURE — 93970 EXTREMITY STUDY: CPT

## 2017-04-25 PROCEDURE — 36415 COLL VENOUS BLD VENIPUNCTURE: CPT

## 2017-04-25 PROCEDURE — 83735 ASSAY OF MAGNESIUM: CPT

## 2017-04-25 PROCEDURE — 2500000003 HC RX 250 WO HCPCS: Performed by: EMERGENCY MEDICINE

## 2017-04-25 PROCEDURE — 6370000000 HC RX 637 (ALT 250 FOR IP): Performed by: NEUROLOGICAL SURGERY

## 2017-04-25 PROCEDURE — 6360000002 HC RX W HCPCS: Performed by: NEUROLOGICAL SURGERY

## 2017-04-25 PROCEDURE — 36600 WITHDRAWAL OF ARTERIAL BLOOD: CPT

## 2017-04-25 PROCEDURE — 85025 COMPLETE CBC W/AUTO DIFF WBC: CPT

## 2017-04-25 PROCEDURE — 82803 BLOOD GASES ANY COMBINATION: CPT

## 2017-04-25 PROCEDURE — 80048 BASIC METABOLIC PNL TOTAL CA: CPT

## 2017-04-25 PROCEDURE — 94003 VENT MGMT INPAT SUBQ DAY: CPT

## 2017-04-25 PROCEDURE — 99232 SBSQ HOSP IP/OBS MODERATE 35: CPT | Performed by: NURSE PRACTITIONER

## 2017-04-25 PROCEDURE — 2000000003 HC NEURO ICU R&B

## 2017-04-25 PROCEDURE — 94762 N-INVAS EAR/PLS OXIMTRY CONT: CPT

## 2017-04-25 PROCEDURE — 82947 ASSAY GLUCOSE BLOOD QUANT: CPT

## 2017-04-25 PROCEDURE — 6370000000 HC RX 637 (ALT 250 FOR IP): Performed by: NURSE PRACTITIONER

## 2017-04-25 PROCEDURE — S0028 INJECTION, FAMOTIDINE, 20 MG: HCPCS | Performed by: EMERGENCY MEDICINE

## 2017-04-25 RX ORDER — LEVETIRACETAM 250 MG/1
250 TABLET ORAL 2 TIMES DAILY
Status: COMPLETED | OUTPATIENT
Start: 2017-04-30 | End: 2017-05-03

## 2017-04-25 RX ORDER — LEVETIRACETAM 500 MG/1
500 TABLET ORAL 2 TIMES DAILY
Status: COMPLETED | OUTPATIENT
Start: 2017-04-25 | End: 2017-04-30

## 2017-04-25 RX ORDER — 0.9 % SODIUM CHLORIDE 0.9 %
VIAL (ML) INJECTION
Status: DISCONTINUED
Start: 2017-04-25 | End: 2017-04-25 | Stop reason: WASHOUT

## 2017-04-25 RX ADMIN — MAGNESIUM SULFATE IN DEXTROSE 1 G: 10 INJECTION, SOLUTION INTRAVENOUS at 06:38

## 2017-04-25 RX ADMIN — CIPROFLOXACIN 400 MG: 2 INJECTION, SOLUTION INTRAVENOUS at 04:36

## 2017-04-25 RX ADMIN — MAGNESIUM SULFATE IN DEXTROSE 1 G: 10 INJECTION, SOLUTION INTRAVENOUS at 11:26

## 2017-04-25 RX ADMIN — INSULIN LISPRO 2 UNITS: 100 INJECTION, SOLUTION INTRAVENOUS; SUBCUTANEOUS at 12:47

## 2017-04-25 RX ADMIN — INSULIN LISPRO 1 UNITS: 100 INJECTION, SOLUTION INTRAVENOUS; SUBCUTANEOUS at 06:35

## 2017-04-25 RX ADMIN — ENOXAPARIN SODIUM 30 MG: 100 INJECTION SUBCUTANEOUS at 20:39

## 2017-04-25 RX ADMIN — NIMODIPINE 30 MG: 60 SOLUTION ORAL at 06:06

## 2017-04-25 RX ADMIN — NIMODIPINE 30 MG: 60 SOLUTION ORAL at 07:57

## 2017-04-25 RX ADMIN — OXYCODONE HYDROCHLORIDE AND ACETAMINOPHEN 2 TABLET: 5; 325 TABLET ORAL at 20:45

## 2017-04-25 RX ADMIN — LEVETIRACETAM 1000 MG: 500 INJECTION, SOLUTION, CONCENTRATE INTRAVENOUS at 09:06

## 2017-04-25 RX ADMIN — NIMODIPINE 30 MG: 60 SOLUTION ORAL at 04:36

## 2017-04-25 RX ADMIN — Medication 26 MCG/MIN: at 19:41

## 2017-04-25 RX ADMIN — MAGNESIUM SULFATE HEPTAHYDRATE: 500 INJECTION, SOLUTION INTRAMUSCULAR; INTRAVENOUS at 01:25

## 2017-04-25 RX ADMIN — NIMODIPINE 30 MG: 60 SOLUTION ORAL at 14:53

## 2017-04-25 RX ADMIN — MAGNESIUM SULFATE IN DEXTROSE 1 G: 10 INJECTION, SOLUTION INTRAVENOUS at 10:19

## 2017-04-25 RX ADMIN — Medication 28 MCG/MIN: at 08:24

## 2017-04-25 RX ADMIN — NIMODIPINE 30 MG: 60 SOLUTION ORAL at 16:00

## 2017-04-25 RX ADMIN — NIMODIPINE 30 MG: 60 SOLUTION ORAL at 02:10

## 2017-04-25 RX ADMIN — NIMODIPINE 30 MG: 60 SOLUTION ORAL at 22:27

## 2017-04-25 RX ADMIN — FAMOTIDINE 20 MG: 10 INJECTION, SOLUTION INTRAVENOUS at 08:00

## 2017-04-25 RX ADMIN — MAGNESIUM SULFATE IN DEXTROSE 1 G: 10 INJECTION, SOLUTION INTRAVENOUS at 12:36

## 2017-04-25 RX ADMIN — NIMODIPINE 30 MG: 60 SOLUTION ORAL at 20:01

## 2017-04-25 RX ADMIN — INSULIN LISPRO 1 UNITS: 100 INJECTION, SOLUTION INTRAVENOUS; SUBCUTANEOUS at 18:13

## 2017-04-25 RX ADMIN — POTASSIUM CHLORIDE 40 MEQ: 40 SOLUTION ORAL at 07:57

## 2017-04-25 RX ADMIN — MAGNESIUM SULFATE HEPTAHYDRATE: 500 INJECTION, SOLUTION INTRAMUSCULAR; INTRAVENOUS at 22:27

## 2017-04-25 RX ADMIN — NIMODIPINE 30 MG: 60 SOLUTION ORAL at 18:02

## 2017-04-25 RX ADMIN — MAGNESIUM SULFATE IN DEXTROSE 1 G: 10 INJECTION, SOLUTION INTRAVENOUS at 20:39

## 2017-04-25 RX ADMIN — MAGNESIUM SULFATE HEPTAHYDRATE: 500 INJECTION, SOLUTION INTRAMUSCULAR; INTRAVENOUS at 08:24

## 2017-04-25 RX ADMIN — NIMODIPINE 30 MG: 60 SOLUTION ORAL at 12:25

## 2017-04-25 RX ADMIN — SIMVASTATIN 80 MG: 40 TABLET, FILM COATED ORAL at 20:39

## 2017-04-25 RX ADMIN — FAMOTIDINE 20 MG: 10 INJECTION, SOLUTION INTRAVENOUS at 20:39

## 2017-04-25 RX ADMIN — FENTANYL CITRATE 50 MCG: 50 INJECTION INTRAMUSCULAR; INTRAVENOUS at 09:58

## 2017-04-25 RX ADMIN — NIMODIPINE 30 MG: 60 SOLUTION ORAL at 00:20

## 2017-04-25 RX ADMIN — NIMODIPINE 30 MG: 60 SOLUTION ORAL at 10:19

## 2017-04-25 RX ADMIN — INSULIN LISPRO 1 UNITS: 100 INJECTION, SOLUTION INTRAVENOUS; SUBCUTANEOUS at 01:35

## 2017-04-25 RX ADMIN — LEVETIRACETAM 500 MG: 500 TABLET, FILM COATED ORAL at 20:39

## 2017-04-25 RX ADMIN — MAGNESIUM SULFATE IN DEXTROSE 1 G: 10 INJECTION, SOLUTION INTRAVENOUS at 22:27

## 2017-04-25 RX ADMIN — MAGNESIUM SULFATE HEPTAHYDRATE: 500 INJECTION, SOLUTION INTRAMUSCULAR; INTRAVENOUS at 14:54

## 2017-04-25 RX ADMIN — CIPROFLOXACIN 400 MG: 2 INJECTION, SOLUTION INTRAVENOUS at 16:28

## 2017-04-25 ASSESSMENT — PAIN SCALES - WONG BAKER: WONGBAKER_NUMERICALRESPONSE: 8

## 2017-04-25 ASSESSMENT — PULMONARY FUNCTION TESTS
PIF_VALUE: 12

## 2017-04-25 ASSESSMENT — PAIN SCALES - GENERAL: PAINLEVEL_OUTOF10: 8

## 2017-04-26 ENCOUNTER — APPOINTMENT (OUTPATIENT)
Dept: INTERVENTIONAL RADIOLOGY/VASCULAR | Age: 57
DRG: 004 | End: 2017-04-26
Attending: NEUROLOGICAL SURGERY
Payer: MEDICARE

## 2017-04-26 ENCOUNTER — APPOINTMENT (OUTPATIENT)
Dept: CT IMAGING | Age: 57
DRG: 004 | End: 2017-04-26
Attending: NEUROLOGICAL SURGERY
Payer: MEDICARE

## 2017-04-26 LAB
ABSOLUTE EOS #: 0.11 K/UL (ref 0–0.4)
ABSOLUTE LYMPH #: 1.74 K/UL (ref 1–4.8)
ABSOLUTE MONO #: 0.76 K/UL (ref 0.1–0.8)
ANION GAP SERPL CALCULATED.3IONS-SCNC: 14 MMOL/L (ref 9–17)
BASOPHILS # BLD: 0 %
BASOPHILS ABSOLUTE: 0 K/UL (ref 0–0.2)
BUN BLDV-MCNC: 9 MG/DL (ref 6–20)
BUN/CREAT BLD: ABNORMAL (ref 9–20)
CALCIUM SERPL-MCNC: 7.8 MG/DL (ref 8.6–10.4)
CHLORIDE BLD-SCNC: 98 MMOL/L (ref 98–107)
CO2: 26 MMOL/L (ref 20–31)
CREAT SERPL-MCNC: 0.27 MG/DL (ref 0.5–0.9)
CULTURE: ABNORMAL
CULTURE: ABNORMAL
DIFFERENTIAL TYPE: ABNORMAL
DIRECT EXAM: ABNORMAL
EKG ATRIAL RATE: 111 BPM
EKG P AXIS: 38 DEGREES
EKG P-R INTERVAL: 134 MS
EKG Q-T INTERVAL: 366 MS
EKG QRS DURATION: 76 MS
EKG QTC CALCULATION (BAZETT): 497 MS
EKG R AXIS: 57 DEGREES
EKG T AXIS: 118 DEGREES
EKG VENTRICULAR RATE: 111 BPM
EOSINOPHILS RELATIVE PERCENT: 1 %
FIO2: 30
GFR AFRICAN AMERICAN: >60 ML/MIN
GFR NON-AFRICAN AMERICAN: >60 ML/MIN
GFR SERPL CREATININE-BSD FRML MDRD: ABNORMAL ML/MIN/{1.73_M2}
GFR SERPL CREATININE-BSD FRML MDRD: ABNORMAL ML/MIN/{1.73_M2}
GLUCOSE BLD-MCNC: 149 MG/DL (ref 65–105)
GLUCOSE BLD-MCNC: 158 MG/DL (ref 65–105)
GLUCOSE BLD-MCNC: 162 MG/DL (ref 65–105)
GLUCOSE BLD-MCNC: 163 MG/DL (ref 70–99)
GLUCOSE BLD-MCNC: 197 MG/DL (ref 65–105)
HCT VFR BLD CALC: 33.2 % (ref 36–46)
HEMOGLOBIN: 11.6 G/DL (ref 12–16)
LYMPHOCYTES # BLD: 16 %
Lab: ABNORMAL
MAGNESIUM: 2 MG/DL (ref 1.6–2.6)
MAGNESIUM: 2.2 MG/DL (ref 1.6–2.6)
MAGNESIUM: 2.3 MG/DL (ref 1.6–2.6)
MCH RBC QN AUTO: 31.9 PG (ref 26–34)
MCHC RBC AUTO-ENTMCNC: 35 G/DL (ref 31–37)
MCV RBC AUTO: 91.1 FL (ref 80–100)
MONOCYTES # BLD: 7 %
MORPHOLOGY: NORMAL
NEGATIVE BASE EXCESS, ART: ABNORMAL (ref 0–2)
O2 DEVICE/FLOW/%: ABNORMAL
OSMOLALITY URINE: 366 MOSM/KG (ref 80–1300)
PATIENT TEMP: ABNORMAL
PDW BLD-RTO: 14.2 % (ref 12.5–15.4)
PLATELET # BLD: 346 K/UL (ref 140–450)
PLATELET ESTIMATE: ABNORMAL
PMV BLD AUTO: 6.9 FL (ref 6–12)
POC HCO3: 28.7 MMOL/L (ref 22–27)
POC O2 SATURATION: 98 %
POC PCO2 TEMP: ABNORMAL MM HG
POC PCO2: 43 MM HG (ref 32–45)
POC PH TEMP: ABNORMAL
POC PH: 7.43 (ref 7.35–7.45)
POC PO2 TEMP: ABNORMAL MM HG
POC PO2: 98 MM HG (ref 75–95)
POSITIVE BASE EXCESS, ART: 4 (ref 0–2)
POTASSIUM SERPL-SCNC: 2.9 MMOL/L (ref 3.7–5.3)
POTASSIUM SERPL-SCNC: 3.3 MMOL/L (ref 3.7–5.3)
RBC # BLD: 3.65 M/UL (ref 4–5.2)
RBC # BLD: ABNORMAL 10*6/UL
SEG NEUTROPHILS: 76 %
SEGMENTED NEUTROPHILS ABSOLUTE COUNT: 8.29 K/UL (ref 1.8–7.7)
SODIUM BLD-SCNC: 138 MMOL/L (ref 135–144)
SODIUM,UR: 147 MMOL/L
SPECIMEN DESCRIPTION: ABNORMAL
STATUS: ABNORMAL
TCO2 (CALC), ART: 30 MM HG (ref 23–28)
WBC # BLD: 10.9 K/UL (ref 3.5–11)
WBC # BLD: ABNORMAL 10*3/UL

## 2017-04-26 PROCEDURE — 3700000000 HC ANESTHESIA ATTENDED CARE

## 2017-04-26 PROCEDURE — B31DYZZ FLUOROSCOPY OF RIGHT VERTEBRAL ARTERY USING OTHER CONTRAST: ICD-10-PCS | Performed by: PSYCHIATRY & NEUROLOGY

## 2017-04-26 PROCEDURE — 2580000003 HC RX 258: Performed by: PSYCHIATRY & NEUROLOGY

## 2017-04-26 PROCEDURE — 36415 COLL VENOUS BLD VENIPUNCTURE: CPT

## 2017-04-26 PROCEDURE — 70450 CT HEAD/BRAIN W/O DYE: CPT

## 2017-04-26 PROCEDURE — S0028 INJECTION, FAMOTIDINE, 20 MG: HCPCS | Performed by: EMERGENCY MEDICINE

## 2017-04-26 PROCEDURE — 6360000002 HC RX W HCPCS: Performed by: EMERGENCY MEDICINE

## 2017-04-26 PROCEDURE — 2580000003 HC RX 258: Performed by: EMERGENCY MEDICINE

## 2017-04-26 PROCEDURE — 6360000004 HC RX CONTRAST MEDICATION: Performed by: NEUROLOGICAL SURGERY

## 2017-04-26 PROCEDURE — 3700000001 HC ADD 15 MINUTES (ANESTHESIA)

## 2017-04-26 PROCEDURE — C1769 GUIDE WIRE: HCPCS

## 2017-04-26 PROCEDURE — 2500000003 HC RX 250 WO HCPCS: Performed by: EMERGENCY MEDICINE

## 2017-04-26 PROCEDURE — 84132 ASSAY OF SERUM POTASSIUM: CPT

## 2017-04-26 PROCEDURE — 99231 SBSQ HOSP IP/OBS SF/LOW 25: CPT

## 2017-04-26 PROCEDURE — 83735 ASSAY OF MAGNESIUM: CPT

## 2017-04-26 PROCEDURE — 94770 HC ETCO2 MONITOR DAILY: CPT

## 2017-04-26 PROCEDURE — 6360000002 HC RX W HCPCS: Performed by: NEUROLOGICAL SURGERY

## 2017-04-26 PROCEDURE — 84300 ASSAY OF URINE SODIUM: CPT

## 2017-04-26 PROCEDURE — 36600 WITHDRAWAL OF ARTERIAL BLOOD: CPT

## 2017-04-26 PROCEDURE — 2580000003 HC RX 258: Performed by: NEUROLOGICAL SURGERY

## 2017-04-26 PROCEDURE — 94640 AIRWAY INHALATION TREATMENT: CPT

## 2017-04-26 PROCEDURE — 82803 BLOOD GASES ANY COMBINATION: CPT

## 2017-04-26 PROCEDURE — 6370000000 HC RX 637 (ALT 250 FOR IP): Performed by: NURSE PRACTITIONER

## 2017-04-26 PROCEDURE — 93886 INTRACRANIAL COMPLETE STUDY: CPT

## 2017-04-26 PROCEDURE — 94003 VENT MGMT INPAT SUBQ DAY: CPT

## 2017-04-26 PROCEDURE — 6370000000 HC RX 637 (ALT 250 FOR IP): Performed by: EMERGENCY MEDICINE

## 2017-04-26 PROCEDURE — 2000000003 HC NEURO ICU R&B

## 2017-04-26 PROCEDURE — 36226 PLACE CATH VERTEBRAL ART: CPT | Performed by: PSYCHIATRY & NEUROLOGY

## 2017-04-26 PROCEDURE — 61650 EVASC PRLNG ADMN RX AGNT 1ST: CPT | Performed by: PSYCHIATRY & NEUROLOGY

## 2017-04-26 PROCEDURE — 83935 ASSAY OF URINE OSMOLALITY: CPT

## 2017-04-26 PROCEDURE — 80048 BASIC METABOLIC PNL TOTAL CA: CPT

## 2017-04-26 PROCEDURE — 99291 CRITICAL CARE FIRST HOUR: CPT | Performed by: INTERNAL MEDICINE

## 2017-04-26 PROCEDURE — 94762 N-INVAS EAR/PLS OXIMTRY CONT: CPT

## 2017-04-26 PROCEDURE — 61651 EVASC PRLNG ADMN RX AGNT ADD: CPT | Performed by: PSYCHIATRY & NEUROLOGY

## 2017-04-26 PROCEDURE — 6370000000 HC RX 637 (ALT 250 FOR IP): Performed by: NEUROLOGICAL SURGERY

## 2017-04-26 PROCEDURE — 6370000000 HC RX 637 (ALT 250 FOR IP): Performed by: PSYCHIATRY & NEUROLOGY

## 2017-04-26 PROCEDURE — B315YZZ FLUOROSCOPY OF BILATERAL COMMON CAROTID ARTERIES USING OTHER CONTRAST: ICD-10-PCS | Performed by: PSYCHIATRY & NEUROLOGY

## 2017-04-26 PROCEDURE — 2500000003 HC RX 250 WO HCPCS: Performed by: INTERNAL MEDICINE

## 2017-04-26 PROCEDURE — 6360000002 HC RX W HCPCS: Performed by: PSYCHIATRY & NEUROLOGY

## 2017-04-26 PROCEDURE — B318YZZ FLUOROSCOPY OF BILATERAL INTERNAL CAROTID ARTERIES USING OTHER CONTRAST: ICD-10-PCS | Performed by: PSYCHIATRY & NEUROLOGY

## 2017-04-26 PROCEDURE — 6360000002 HC RX W HCPCS: Performed by: STUDENT IN AN ORGANIZED HEALTH CARE EDUCATION/TRAINING PROGRAM

## 2017-04-26 PROCEDURE — 36224 PLACE CATH CAROTD ART: CPT | Performed by: PSYCHIATRY & NEUROLOGY

## 2017-04-26 PROCEDURE — 85025 COMPLETE CBC W/AUTO DIFF WBC: CPT

## 2017-04-26 PROCEDURE — 93005 ELECTROCARDIOGRAM TRACING: CPT

## 2017-04-26 PROCEDURE — 2500000003 HC RX 250 WO HCPCS: Performed by: PSYCHIATRY & NEUROLOGY

## 2017-04-26 PROCEDURE — 82947 ASSAY GLUCOSE BLOOD QUANT: CPT

## 2017-04-26 RX ORDER — IODIXANOL 320 MG/ML
100 INJECTION, SOLUTION INTRAVASCULAR
Status: COMPLETED | OUTPATIENT
Start: 2017-04-26 | End: 2017-04-26

## 2017-04-26 RX ADMIN — IODIXANOL 50 ML: 320 INJECTION, SOLUTION INTRAVASCULAR at 15:55

## 2017-04-26 RX ADMIN — MAGNESIUM SULFATE HEPTAHYDRATE: 500 INJECTION, SOLUTION INTRAMUSCULAR; INTRAVENOUS at 13:26

## 2017-04-26 RX ADMIN — NIMODIPINE 30 MG: 60 SOLUTION ORAL at 12:18

## 2017-04-26 RX ADMIN — Medication 10 ML: at 07:59

## 2017-04-26 RX ADMIN — MAGNESIUM SULFATE HEPTAHYDRATE: 500 INJECTION, SOLUTION INTRAMUSCULAR; INTRAVENOUS at 04:37

## 2017-04-26 RX ADMIN — FENTANYL CITRATE 50 MCG: 50 INJECTION INTRAMUSCULAR; INTRAVENOUS at 11:45

## 2017-04-26 RX ADMIN — NOREPINEPHRINE BITARTRATE: 1 INJECTION, SOLUTION, CONCENTRATE INTRAVENOUS at 12:28

## 2017-04-26 RX ADMIN — EPHEDRINE SULFATE 20 MG: 50 INJECTION, SOLUTION INTRAVENOUS at 16:10

## 2017-04-26 RX ADMIN — INSULIN LISPRO 1 UNITS: 100 INJECTION, SOLUTION INTRAVENOUS; SUBCUTANEOUS at 06:34

## 2017-04-26 RX ADMIN — FAMOTIDINE 20 MG: 10 INJECTION, SOLUTION INTRAVENOUS at 20:44

## 2017-04-26 RX ADMIN — LEVETIRACETAM 500 MG: 500 TABLET, FILM COATED ORAL at 07:59

## 2017-04-26 RX ADMIN — MAGNESIUM SULFATE HEPTAHYDRATE: 500 INJECTION, SOLUTION INTRAMUSCULAR; INTRAVENOUS at 21:55

## 2017-04-26 RX ADMIN — NIMODIPINE 30 MG: 60 SOLUTION ORAL at 23:52

## 2017-04-26 RX ADMIN — NIMODIPINE 30 MG: 60 SOLUTION ORAL at 22:05

## 2017-04-26 RX ADMIN — INSULIN LISPRO 1 UNITS: 100 INJECTION, SOLUTION INTRAVENOUS; SUBCUTANEOUS at 23:52

## 2017-04-26 RX ADMIN — POTASSIUM CHLORIDE 20 MEQ: 29.8 INJECTION, SOLUTION INTRAVENOUS at 12:23

## 2017-04-26 RX ADMIN — NIMODIPINE 30 MG: 60 SOLUTION ORAL at 07:56

## 2017-04-26 RX ADMIN — SIMVASTATIN 80 MG: 40 TABLET, FILM COATED ORAL at 20:44

## 2017-04-26 RX ADMIN — OXYCODONE HYDROCHLORIDE AND ACETAMINOPHEN 2 TABLET: 5; 325 TABLET ORAL at 07:55

## 2017-04-26 RX ADMIN — OXYCODONE HYDROCHLORIDE AND ACETAMINOPHEN 2 TABLET: 5; 325 TABLET ORAL at 12:00

## 2017-04-26 RX ADMIN — NIMODIPINE 30 MG: 60 SOLUTION ORAL at 10:00

## 2017-04-26 RX ADMIN — NIMODIPINE 30 MG: 60 SOLUTION ORAL at 06:33

## 2017-04-26 RX ADMIN — FENTANYL CITRATE 50 MCG: 50 INJECTION INTRAMUSCULAR; INTRAVENOUS at 20:04

## 2017-04-26 RX ADMIN — POTASSIUM CHLORIDE 20 MEQ: 29.8 INJECTION, SOLUTION INTRAVENOUS at 09:19

## 2017-04-26 RX ADMIN — INSULIN LISPRO 1 UNITS: 100 INJECTION, SOLUTION INTRAVENOUS; SUBCUTANEOUS at 00:11

## 2017-04-26 RX ADMIN — POTASSIUM CHLORIDE 20 MEQ: 29.8 INJECTION, SOLUTION INTRAVENOUS at 11:01

## 2017-04-26 RX ADMIN — INSULIN LISPRO 1 UNITS: 100 INJECTION, SOLUTION INTRAVENOUS; SUBCUTANEOUS at 12:28

## 2017-04-26 RX ADMIN — FENTANYL CITRATE 50 MCG: 50 INJECTION INTRAMUSCULAR; INTRAVENOUS at 23:44

## 2017-04-26 RX ADMIN — NIMODIPINE 30 MG: 60 SOLUTION ORAL at 00:11

## 2017-04-26 RX ADMIN — FENTANYL CITRATE 50 MCG: 50 INJECTION, SOLUTION INTRAMUSCULAR; INTRAVENOUS at 16:10

## 2017-04-26 RX ADMIN — MAGNESIUM SULFATE HEPTAHYDRATE: 500 INJECTION, SOLUTION INTRAMUSCULAR; INTRAVENOUS at 11:01

## 2017-04-26 RX ADMIN — MAGNESIUM SULFATE IN DEXTROSE 1 G: 10 INJECTION, SOLUTION INTRAVENOUS at 00:11

## 2017-04-26 RX ADMIN — NOREPINEPHRINE BITARTRATE: 1 INJECTION, SOLUTION, CONCENTRATE INTRAVENOUS at 20:48

## 2017-04-26 RX ADMIN — MAGNESIUM SULFATE HEPTAHYDRATE: 500 INJECTION, SOLUTION INTRAMUSCULAR; INTRAVENOUS at 20:46

## 2017-04-26 RX ADMIN — FENTANYL CITRATE 50 MCG: 50 INJECTION INTRAMUSCULAR; INTRAVENOUS at 17:41

## 2017-04-26 RX ADMIN — LIDOCAINE HYDROCHLORIDE 4 ML: 40 INJECTION, SOLUTION RETROBULBAR; TOPICAL at 01:28

## 2017-04-26 RX ADMIN — NIMODIPINE 30 MG: 60 SOLUTION ORAL at 19:51

## 2017-04-26 RX ADMIN — ENOXAPARIN SODIUM 30 MG: 100 INJECTION SUBCUTANEOUS at 07:57

## 2017-04-26 RX ADMIN — OXYCODONE HYDROCHLORIDE AND ACETAMINOPHEN 2 TABLET: 5; 325 TABLET ORAL at 20:44

## 2017-04-26 RX ADMIN — ENOXAPARIN SODIUM 30 MG: 100 INJECTION SUBCUTANEOUS at 20:44

## 2017-04-26 RX ADMIN — CIPROFLOXACIN 400 MG: 2 INJECTION, SOLUTION INTRAVENOUS at 04:15

## 2017-04-26 RX ADMIN — NIMODIPINE 30 MG: 60 SOLUTION ORAL at 02:08

## 2017-04-26 RX ADMIN — FAMOTIDINE 20 MG: 10 INJECTION, SOLUTION INTRAVENOUS at 07:58

## 2017-04-26 RX ADMIN — PHENYLEPHRINE HYDROCHLORIDE 200 MCG/MIN: 10 INJECTION INTRAVENOUS at 16:00

## 2017-04-26 RX ADMIN — MAGNESIUM SULFATE HEPTAHYDRATE: 500 INJECTION, SOLUTION INTRAMUSCULAR; INTRAVENOUS at 12:24

## 2017-04-26 RX ADMIN — MAGNESIUM SULFATE HEPTAHYDRATE: 500 INJECTION, SOLUTION INTRAMUSCULAR; INTRAVENOUS at 23:06

## 2017-04-26 RX ADMIN — LEVETIRACETAM 500 MG: 500 TABLET, FILM COATED ORAL at 20:44

## 2017-04-26 RX ADMIN — CIPROFLOXACIN 400 MG: 2 INJECTION, SOLUTION INTRAVENOUS at 17:41

## 2017-04-26 RX ADMIN — ASPIRIN 81 MG: 81 TABLET, CHEWABLE ORAL at 07:58

## 2017-04-26 RX ADMIN — NIMODIPINE 30 MG: 60 SOLUTION ORAL at 04:14

## 2017-04-26 RX ADMIN — OXYCODONE HYDROCHLORIDE AND ACETAMINOPHEN 2 TABLET: 5; 325 TABLET ORAL at 02:08

## 2017-04-26 RX ADMIN — Medication 30 MCG/MIN: at 06:56

## 2017-04-26 ASSESSMENT — PAIN SCALES - WONG BAKER
WONGBAKER_NUMERICALRESPONSE: 8

## 2017-04-26 ASSESSMENT — PULMONARY FUNCTION TESTS
PIF_VALUE: 11
PIF_VALUE: 10
PIF_VALUE: 12
PIF_VALUE: 15
PIF_VALUE: 10
PIF_VALUE: 10
PIF_VALUE: 12

## 2017-04-26 ASSESSMENT — PAIN SCALES - GENERAL
PAINLEVEL_OUTOF10: 8
PAINLEVEL_OUTOF10: 0

## 2017-04-27 ENCOUNTER — ANESTHESIA (OUTPATIENT)
Dept: CT IMAGING | Age: 57
DRG: 004 | End: 2017-04-27
Payer: MEDICARE

## 2017-04-27 ENCOUNTER — APPOINTMENT (OUTPATIENT)
Dept: CT IMAGING | Age: 57
DRG: 004 | End: 2017-04-27
Attending: NEUROLOGICAL SURGERY
Payer: MEDICARE

## 2017-04-27 ENCOUNTER — ANESTHESIA EVENT (OUTPATIENT)
Dept: CT IMAGING | Age: 57
DRG: 004 | End: 2017-04-27
Payer: MEDICARE

## 2017-04-27 LAB
ABSOLUTE EOS #: 0.58 K/UL (ref 0–0.4)
ABSOLUTE LYMPH #: 0.96 K/UL (ref 1–4.8)
ABSOLUTE MONO #: 1.06 K/UL (ref 0.1–0.8)
ANION GAP SERPL CALCULATED.3IONS-SCNC: 12 MMOL/L (ref 9–17)
BASOPHILS # BLD: 0 %
BASOPHILS ABSOLUTE: 0 K/UL (ref 0–0.2)
BUN BLDV-MCNC: 8 MG/DL (ref 6–20)
BUN/CREAT BLD: ABNORMAL (ref 9–20)
CALCIUM SERPL-MCNC: 8.1 MG/DL (ref 8.6–10.4)
CHLORIDE BLD-SCNC: 99 MMOL/L (ref 98–107)
CO2: 28 MMOL/L (ref 20–31)
CREAT SERPL-MCNC: 0.3 MG/DL (ref 0.5–0.9)
CULTURE: ABNORMAL
CULTURE: ABNORMAL
DIFFERENTIAL TYPE: ABNORMAL
DIRECT EXAM: ABNORMAL
EOSINOPHILS RELATIVE PERCENT: 6 %
FIO2: 30
GFR AFRICAN AMERICAN: >60 ML/MIN
GFR NON-AFRICAN AMERICAN: >60 ML/MIN
GFR SERPL CREATININE-BSD FRML MDRD: ABNORMAL ML/MIN/{1.73_M2}
GFR SERPL CREATININE-BSD FRML MDRD: ABNORMAL ML/MIN/{1.73_M2}
GLUCOSE BLD-MCNC: 153 MG/DL (ref 65–105)
GLUCOSE BLD-MCNC: 154 MG/DL (ref 70–99)
GLUCOSE BLD-MCNC: 174 MG/DL (ref 65–105)
GLUCOSE BLD-MCNC: 186 MG/DL (ref 65–105)
HCT VFR BLD CALC: 32.7 % (ref 36–46)
HEMOGLOBIN: 10.8 G/DL (ref 12–16)
LYMPHOCYTES # BLD: 10 %
Lab: ABNORMAL
MAGNESIUM: 2.1 MG/DL (ref 1.6–2.6)
MAGNESIUM: 2.3 MG/DL (ref 1.6–2.6)
MCH RBC QN AUTO: 31 PG (ref 26–34)
MCHC RBC AUTO-ENTMCNC: 33.1 G/DL (ref 31–37)
MCV RBC AUTO: 93.6 FL (ref 80–100)
MONOCYTES # BLD: 11 %
MORPHOLOGY: NORMAL
NEGATIVE BASE EXCESS, ART: ABNORMAL (ref 0–2)
O2 DEVICE/FLOW/%: ABNORMAL
PATIENT TEMP: ABNORMAL
PDW BLD-RTO: 15 % (ref 12.5–15.4)
PLATELET # BLD: 351 K/UL (ref 140–450)
PLATELET ESTIMATE: ABNORMAL
PMV BLD AUTO: 6.5 FL (ref 6–12)
POC HCO3: 28.5 MMOL/L (ref 22–27)
POC O2 SATURATION: 99 %
POC PCO2 TEMP: ABNORMAL MM HG
POC PCO2: 46 MM HG (ref 32–45)
POC PH TEMP: ABNORMAL
POC PH: 7.4 (ref 7.35–7.45)
POC PO2 TEMP: ABNORMAL MM HG
POC PO2: 117 MM HG (ref 75–95)
POSITIVE BASE EXCESS, ART: 4 (ref 0–2)
POTASSIUM SERPL-SCNC: 3.4 MMOL/L (ref 3.7–5.3)
POTASSIUM SERPL-SCNC: 3.7 MMOL/L (ref 3.7–5.3)
RBC # BLD: 3.49 M/UL (ref 4–5.2)
RBC # BLD: ABNORMAL 10*6/UL
SEG NEUTROPHILS: 73 %
SEGMENTED NEUTROPHILS ABSOLUTE COUNT: 7 K/UL (ref 1.8–7.7)
SODIUM BLD-SCNC: 139 MMOL/L (ref 135–144)
SPECIMEN DESCRIPTION: ABNORMAL
STATUS: ABNORMAL
TCO2 (CALC), ART: 30 MM HG (ref 23–28)
WBC # BLD: 9.6 K/UL (ref 3.5–11)
WBC # BLD: ABNORMAL 10*3/UL

## 2017-04-27 PROCEDURE — 36415 COLL VENOUS BLD VENIPUNCTURE: CPT

## 2017-04-27 PROCEDURE — 85025 COMPLETE CBC W/AUTO DIFF WBC: CPT

## 2017-04-27 PROCEDURE — 2000000003 HC NEURO ICU R&B

## 2017-04-27 PROCEDURE — 94681 O2 UPTK CO2 OUTP % O2 XTRC: CPT

## 2017-04-27 PROCEDURE — 83735 ASSAY OF MAGNESIUM: CPT

## 2017-04-27 PROCEDURE — 6360000002 HC RX W HCPCS: Performed by: EMERGENCY MEDICINE

## 2017-04-27 PROCEDURE — 2580000003 HC RX 258: Performed by: PSYCHIATRY & NEUROLOGY

## 2017-04-27 PROCEDURE — 99233 SBSQ HOSP IP/OBS HIGH 50: CPT | Performed by: PSYCHIATRY & NEUROLOGY

## 2017-04-27 PROCEDURE — 6360000002 HC RX W HCPCS: Performed by: NEUROLOGICAL SURGERY

## 2017-04-27 PROCEDURE — 99291 CRITICAL CARE FIRST HOUR: CPT | Performed by: INTERNAL MEDICINE

## 2017-04-27 PROCEDURE — 3700000000 HC ANESTHESIA ATTENDED CARE

## 2017-04-27 PROCEDURE — 93886 INTRACRANIAL COMPLETE STUDY: CPT

## 2017-04-27 PROCEDURE — 82803 BLOOD GASES ANY COMBINATION: CPT

## 2017-04-27 PROCEDURE — 6370000000 HC RX 637 (ALT 250 FOR IP): Performed by: PSYCHIATRY & NEUROLOGY

## 2017-04-27 PROCEDURE — 84132 ASSAY OF SERUM POTASSIUM: CPT

## 2017-04-27 PROCEDURE — 6360000002 HC RX W HCPCS: Performed by: STUDENT IN AN ORGANIZED HEALTH CARE EDUCATION/TRAINING PROGRAM

## 2017-04-27 PROCEDURE — 99211 OFF/OP EST MAY X REQ PHY/QHP: CPT

## 2017-04-27 PROCEDURE — S0028 INJECTION, FAMOTIDINE, 20 MG: HCPCS | Performed by: EMERGENCY MEDICINE

## 2017-04-27 PROCEDURE — 2500000003 HC RX 250 WO HCPCS: Performed by: PSYCHIATRY & NEUROLOGY

## 2017-04-27 PROCEDURE — 6370000000 HC RX 637 (ALT 250 FOR IP): Performed by: NURSE PRACTITIONER

## 2017-04-27 PROCEDURE — 82947 ASSAY GLUCOSE BLOOD QUANT: CPT

## 2017-04-27 PROCEDURE — 80048 BASIC METABOLIC PNL TOTAL CA: CPT

## 2017-04-27 PROCEDURE — 99232 SBSQ HOSP IP/OBS MODERATE 35: CPT | Performed by: NURSE PRACTITIONER

## 2017-04-27 PROCEDURE — 70450 CT HEAD/BRAIN W/O DYE: CPT

## 2017-04-27 PROCEDURE — 2580000003 HC RX 258: Performed by: NEUROLOGICAL SURGERY

## 2017-04-27 PROCEDURE — 3700000001 HC ADD 15 MINUTES (ANESTHESIA)

## 2017-04-27 PROCEDURE — 36600 WITHDRAWAL OF ARTERIAL BLOOD: CPT

## 2017-04-27 PROCEDURE — 6360000002 HC RX W HCPCS: Performed by: PSYCHIATRY & NEUROLOGY

## 2017-04-27 PROCEDURE — 2580000003 HC RX 258: Performed by: EMERGENCY MEDICINE

## 2017-04-27 PROCEDURE — 6370000000 HC RX 637 (ALT 250 FOR IP): Performed by: NEUROLOGICAL SURGERY

## 2017-04-27 PROCEDURE — 94762 N-INVAS EAR/PLS OXIMTRY CONT: CPT

## 2017-04-27 PROCEDURE — 94003 VENT MGMT INPAT SUBQ DAY: CPT

## 2017-04-27 PROCEDURE — 6370000000 HC RX 637 (ALT 250 FOR IP): Performed by: EMERGENCY MEDICINE

## 2017-04-27 PROCEDURE — 2500000003 HC RX 250 WO HCPCS: Performed by: EMERGENCY MEDICINE

## 2017-04-27 PROCEDURE — 97110 THERAPEUTIC EXERCISES: CPT

## 2017-04-27 RX ORDER — FENTANYL CITRATE 50 UG/ML
INJECTION, SOLUTION INTRAMUSCULAR; INTRAVENOUS PRN
Status: DISCONTINUED | OUTPATIENT
Start: 2017-04-26 | End: 2017-04-27 | Stop reason: SDUPTHER

## 2017-04-27 RX ORDER — EPHEDRINE SULFATE 50 MG/ML
INJECTION, SOLUTION INTRAVENOUS PRN
Status: DISCONTINUED | OUTPATIENT
Start: 2017-04-26 | End: 2017-04-27 | Stop reason: SDUPTHER

## 2017-04-27 RX ADMIN — MAGNESIUM SULFATE HEPTAHYDRATE: 500 INJECTION, SOLUTION INTRAMUSCULAR; INTRAVENOUS at 18:56

## 2017-04-27 RX ADMIN — MAGNESIUM SULFATE HEPTAHYDRATE: 500 INJECTION, SOLUTION INTRAMUSCULAR; INTRAVENOUS at 11:45

## 2017-04-27 RX ADMIN — POTASSIUM CHLORIDE 20 MEQ: 29.8 INJECTION, SOLUTION INTRAVENOUS at 11:22

## 2017-04-27 RX ADMIN — NIMODIPINE 30 MG: 60 SOLUTION ORAL at 11:45

## 2017-04-27 RX ADMIN — MAGNESIUM SULFATE HEPTAHYDRATE: 500 INJECTION, SOLUTION INTRAMUSCULAR; INTRAVENOUS at 21:02

## 2017-04-27 RX ADMIN — INSULIN LISPRO 1 UNITS: 100 INJECTION, SOLUTION INTRAVENOUS; SUBCUTANEOUS at 06:06

## 2017-04-27 RX ADMIN — NIMODIPINE 30 MG: 60 SOLUTION ORAL at 17:31

## 2017-04-27 RX ADMIN — ASPIRIN 81 MG: 81 TABLET, CHEWABLE ORAL at 09:34

## 2017-04-27 RX ADMIN — Medication 10 ML: at 09:37

## 2017-04-27 RX ADMIN — INSULIN LISPRO 1 UNITS: 100 INJECTION, SOLUTION INTRAVENOUS; SUBCUTANEOUS at 17:49

## 2017-04-27 RX ADMIN — NIMODIPINE 30 MG: 60 SOLUTION ORAL at 01:55

## 2017-04-27 RX ADMIN — CIPROFLOXACIN 400 MG: 2 INJECTION, SOLUTION INTRAVENOUS at 17:30

## 2017-04-27 RX ADMIN — MAGNESIUM SULFATE HEPTAHYDRATE: 500 INJECTION, SOLUTION INTRAMUSCULAR; INTRAVENOUS at 23:15

## 2017-04-27 RX ADMIN — INSULIN LISPRO 1 UNITS: 100 INJECTION, SOLUTION INTRAVENOUS; SUBCUTANEOUS at 11:44

## 2017-04-27 RX ADMIN — NIMODIPINE 30 MG: 60 SOLUTION ORAL at 13:31

## 2017-04-27 RX ADMIN — FENTANYL CITRATE 50 MCG: 50 INJECTION INTRAMUSCULAR; INTRAVENOUS at 05:28

## 2017-04-27 RX ADMIN — NIMODIPINE 30 MG: 60 SOLUTION ORAL at 15:16

## 2017-04-27 RX ADMIN — MAGNESIUM SULFATE HEPTAHYDRATE: 500 INJECTION, SOLUTION INTRAMUSCULAR; INTRAVENOUS at 15:15

## 2017-04-27 RX ADMIN — POTASSIUM CHLORIDE 20 MEQ: 29.8 INJECTION, SOLUTION INTRAVENOUS at 12:44

## 2017-04-27 RX ADMIN — OXYCODONE HYDROCHLORIDE AND ACETAMINOPHEN 2 TABLET: 5; 325 TABLET ORAL at 09:30

## 2017-04-27 RX ADMIN — NIMODIPINE 30 MG: 60 SOLUTION ORAL at 22:14

## 2017-04-27 RX ADMIN — FENTANYL CITRATE 50 MCG: 50 INJECTION INTRAMUSCULAR; INTRAVENOUS at 10:52

## 2017-04-27 RX ADMIN — NIMODIPINE 30 MG: 60 SOLUTION ORAL at 09:33

## 2017-04-27 RX ADMIN — OXYCODONE HYDROCHLORIDE AND ACETAMINOPHEN 2 TABLET: 5; 325 TABLET ORAL at 13:31

## 2017-04-27 RX ADMIN — MAGNESIUM SULFATE HEPTAHYDRATE: 500 INJECTION, SOLUTION INTRAMUSCULAR; INTRAVENOUS at 19:59

## 2017-04-27 RX ADMIN — NIMODIPINE 30 MG: 60 SOLUTION ORAL at 06:06

## 2017-04-27 RX ADMIN — NOREPINEPHRINE BITARTRATE: 1 INJECTION, SOLUTION, CONCENTRATE INTRAVENOUS at 13:32

## 2017-04-27 RX ADMIN — NIMODIPINE 30 MG: 60 SOLUTION ORAL at 03:46

## 2017-04-27 RX ADMIN — OXYCODONE HYDROCHLORIDE AND ACETAMINOPHEN 2 TABLET: 5; 325 TABLET ORAL at 05:28

## 2017-04-27 RX ADMIN — MAGNESIUM SULFATE HEPTAHYDRATE: 500 INJECTION, SOLUTION INTRAMUSCULAR; INTRAVENOUS at 01:29

## 2017-04-27 RX ADMIN — SODIUM CHLORIDE: 9 INJECTION, SOLUTION INTRAVENOUS at 03:46

## 2017-04-27 RX ADMIN — NOREPINEPHRINE BITARTRATE: 1 INJECTION, SOLUTION, CONCENTRATE INTRAVENOUS at 03:47

## 2017-04-27 RX ADMIN — MAGNESIUM SULFATE HEPTAHYDRATE: 500 INJECTION, SOLUTION INTRAMUSCULAR; INTRAVENOUS at 12:51

## 2017-04-27 RX ADMIN — LEVETIRACETAM 500 MG: 500 TABLET, FILM COATED ORAL at 09:34

## 2017-04-27 RX ADMIN — MAGNESIUM SULFATE HEPTAHYDRATE: 500 INJECTION, SOLUTION INTRAMUSCULAR; INTRAVENOUS at 16:06

## 2017-04-27 RX ADMIN — LEVETIRACETAM 500 MG: 500 TABLET, FILM COATED ORAL at 20:17

## 2017-04-27 RX ADMIN — MAGNESIUM SULFATE HEPTAHYDRATE: 500 INJECTION, SOLUTION INTRAMUSCULAR; INTRAVENOUS at 09:38

## 2017-04-27 RX ADMIN — OXYCODONE HYDROCHLORIDE AND ACETAMINOPHEN 2 TABLET: 5; 325 TABLET ORAL at 17:30

## 2017-04-27 RX ADMIN — OXYCODONE HYDROCHLORIDE AND ACETAMINOPHEN 2 TABLET: 5; 325 TABLET ORAL at 01:28

## 2017-04-27 RX ADMIN — OXYCODONE HYDROCHLORIDE AND ACETAMINOPHEN 2 TABLET: 5; 325 TABLET ORAL at 21:32

## 2017-04-27 RX ADMIN — NIMODIPINE 30 MG: 60 SOLUTION ORAL at 08:01

## 2017-04-27 RX ADMIN — CIPROFLOXACIN 400 MG: 2 INJECTION, SOLUTION INTRAVENOUS at 03:57

## 2017-04-27 RX ADMIN — FAMOTIDINE 20 MG: 10 INJECTION, SOLUTION INTRAVENOUS at 09:34

## 2017-04-27 RX ADMIN — NOREPINEPHRINE BITARTRATE: 1 INJECTION, SOLUTION, CONCENTRATE INTRAVENOUS at 20:12

## 2017-04-27 RX ADMIN — FENTANYL CITRATE 50 MCG: 50 INJECTION INTRAMUSCULAR; INTRAVENOUS at 08:51

## 2017-04-27 RX ADMIN — Medication 10 ML: at 09:40

## 2017-04-27 RX ADMIN — ENOXAPARIN SODIUM 30 MG: 100 INJECTION SUBCUTANEOUS at 20:15

## 2017-04-27 RX ADMIN — MAGNESIUM SULFATE HEPTAHYDRATE: 500 INJECTION, SOLUTION INTRAMUSCULAR; INTRAVENOUS at 03:47

## 2017-04-27 RX ADMIN — FENTANYL CITRATE 50 MCG: 50 INJECTION INTRAMUSCULAR; INTRAVENOUS at 16:30

## 2017-04-27 RX ADMIN — FAMOTIDINE 20 MG: 10 INJECTION, SOLUTION INTRAVENOUS at 20:15

## 2017-04-27 RX ADMIN — FENTANYL CITRATE 50 MCG: 50 INJECTION INTRAMUSCULAR; INTRAVENOUS at 03:24

## 2017-04-27 RX ADMIN — SIMVASTATIN 80 MG: 40 TABLET, FILM COATED ORAL at 20:15

## 2017-04-27 RX ADMIN — NIMODIPINE 30 MG: 60 SOLUTION ORAL at 20:15

## 2017-04-27 RX ADMIN — ENOXAPARIN SODIUM 30 MG: 100 INJECTION SUBCUTANEOUS at 09:34

## 2017-04-27 RX ADMIN — POTASSIUM CHLORIDE 40 MEQ: 40 SOLUTION ORAL at 01:30

## 2017-04-27 RX ADMIN — NOREPINEPHRINE BITARTRATE: 1 INJECTION, SOLUTION, CONCENTRATE INTRAVENOUS at 09:33

## 2017-04-27 ASSESSMENT — PULMONARY FUNCTION TESTS
PIF_VALUE: 11
PIF_VALUE: 25
PIF_VALUE: 9
PIF_VALUE: 12
PIF_VALUE: 39
PIF_VALUE: 11
PIF_VALUE: 18
PIF_VALUE: 11

## 2017-04-27 ASSESSMENT — PAIN DESCRIPTION - DESCRIPTORS: DESCRIPTORS: OTHER (COMMENT)

## 2017-04-27 ASSESSMENT — PAIN SCALES - GENERAL
PAINLEVEL_OUTOF10: 8

## 2017-04-27 ASSESSMENT — PAIN SCALES - WONG BAKER
WONGBAKER_NUMERICALRESPONSE: 8
WONGBAKER_NUMERICALRESPONSE: 0

## 2017-04-27 ASSESSMENT — PAIN DESCRIPTION - PAIN TYPE: TYPE: OTHER (COMMENT)

## 2017-04-28 ENCOUNTER — APPOINTMENT (OUTPATIENT)
Dept: CT IMAGING | Age: 57
DRG: 004 | End: 2017-04-28
Attending: NEUROLOGICAL SURGERY
Payer: MEDICARE

## 2017-04-28 LAB
ABSOLUTE EOS #: 0.1 K/UL (ref 0–0.4)
ABSOLUTE LYMPH #: 0.49 K/UL (ref 1–4.8)
ABSOLUTE MONO #: 0.97 K/UL (ref 0.1–0.8)
ANION GAP SERPL CALCULATED.3IONS-SCNC: 14 MMOL/L (ref 9–17)
BASOPHILS # BLD: 1 %
BASOPHILS ABSOLUTE: 0.1 K/UL (ref 0–0.2)
BUN BLDV-MCNC: 9 MG/DL (ref 6–20)
BUN/CREAT BLD: ABNORMAL (ref 9–20)
CALCIUM SERPL-MCNC: 7.7 MG/DL (ref 8.6–10.4)
CHLORIDE BLD-SCNC: 96 MMOL/L (ref 98–107)
CO2: 25 MMOL/L (ref 20–31)
CREAT SERPL-MCNC: 0.26 MG/DL (ref 0.5–0.9)
DIFFERENTIAL TYPE: ABNORMAL
EOSINOPHILS RELATIVE PERCENT: 1 %
FIO2: 40
GFR AFRICAN AMERICAN: >60 ML/MIN
GFR NON-AFRICAN AMERICAN: >60 ML/MIN
GFR SERPL CREATININE-BSD FRML MDRD: ABNORMAL ML/MIN/{1.73_M2}
GFR SERPL CREATININE-BSD FRML MDRD: ABNORMAL ML/MIN/{1.73_M2}
GLUCOSE BLD-MCNC: 158 MG/DL (ref 65–105)
GLUCOSE BLD-MCNC: 163 MG/DL (ref 65–105)
GLUCOSE BLD-MCNC: 163 MG/DL (ref 65–105)
GLUCOSE BLD-MCNC: 186 MG/DL (ref 70–99)
GLUCOSE BLD-MCNC: 199 MG/DL (ref 65–105)
HCT VFR BLD CALC: 34.1 % (ref 36–46)
HEMOGLOBIN: 11.6 G/DL (ref 12–16)
LYMPHOCYTES # BLD: 5 %
MAGNESIUM: 2.3 MG/DL (ref 1.6–2.6)
MAGNESIUM: 3.2 MG/DL (ref 1.6–2.6)
MCH RBC QN AUTO: 31.8 PG (ref 26–34)
MCHC RBC AUTO-ENTMCNC: 33.9 G/DL (ref 31–37)
MCV RBC AUTO: 93.5 FL (ref 80–100)
MONOCYTES # BLD: 10 %
MORPHOLOGY: NORMAL
NEGATIVE BASE EXCESS, ART: ABNORMAL (ref 0–2)
O2 DEVICE/FLOW/%: ABNORMAL
PATIENT TEMP: ABNORMAL
PDW BLD-RTO: 14.8 % (ref 12.5–15.4)
PLATELET # BLD: 348 K/UL (ref 140–450)
PLATELET ESTIMATE: ABNORMAL
PMV BLD AUTO: 6.9 FL (ref 6–12)
POC HCO3: 29.2 MMOL/L (ref 22–27)
POC O2 SATURATION: 99 %
POC PCO2 TEMP: ABNORMAL MM HG
POC PCO2: 40 MM HG (ref 32–45)
POC PH TEMP: ABNORMAL
POC PH: 7.47 (ref 7.35–7.45)
POC PO2 TEMP: ABNORMAL MM HG
POC PO2: 110 MM HG (ref 75–95)
POSITIVE BASE EXCESS, ART: 6 (ref 0–2)
POTASSIUM SERPL-SCNC: 3.6 MMOL/L (ref 3.7–5.3)
RBC # BLD: 3.64 M/UL (ref 4–5.2)
RBC # BLD: ABNORMAL 10*6/UL
SEG NEUTROPHILS: 83 %
SEGMENTED NEUTROPHILS ABSOLUTE COUNT: 8.04 K/UL (ref 1.8–7.7)
SODIUM BLD-SCNC: 135 MMOL/L (ref 135–144)
TCO2 (CALC), ART: 30 MM HG (ref 23–28)
WBC # BLD: 9.7 K/UL (ref 3.5–11)
WBC # BLD: ABNORMAL 10*3/UL

## 2017-04-28 PROCEDURE — 2500000003 HC RX 250 WO HCPCS: Performed by: NURSE PRACTITIONER

## 2017-04-28 PROCEDURE — 82803 BLOOD GASES ANY COMBINATION: CPT

## 2017-04-28 PROCEDURE — 2580000003 HC RX 258: Performed by: PSYCHIATRY & NEUROLOGY

## 2017-04-28 PROCEDURE — 6360000004 HC RX CONTRAST MEDICATION: Performed by: NURSE PRACTITIONER

## 2017-04-28 PROCEDURE — 97110 THERAPEUTIC EXERCISES: CPT

## 2017-04-28 PROCEDURE — 94762 N-INVAS EAR/PLS OXIMTRY CONT: CPT

## 2017-04-28 PROCEDURE — S0028 INJECTION, FAMOTIDINE, 20 MG: HCPCS | Performed by: EMERGENCY MEDICINE

## 2017-04-28 PROCEDURE — 36415 COLL VENOUS BLD VENIPUNCTURE: CPT

## 2017-04-28 PROCEDURE — 6360000002 HC RX W HCPCS: Performed by: NEUROLOGICAL SURGERY

## 2017-04-28 PROCEDURE — 6360000002 HC RX W HCPCS: Performed by: STUDENT IN AN ORGANIZED HEALTH CARE EDUCATION/TRAINING PROGRAM

## 2017-04-28 PROCEDURE — 2000000003 HC NEURO ICU R&B

## 2017-04-28 PROCEDURE — 6370000000 HC RX 637 (ALT 250 FOR IP): Performed by: EMERGENCY MEDICINE

## 2017-04-28 PROCEDURE — 94681 O2 UPTK CO2 OUTP % O2 XTRC: CPT

## 2017-04-28 PROCEDURE — 2580000003 HC RX 258: Performed by: NEUROLOGICAL SURGERY

## 2017-04-28 PROCEDURE — 6360000002 HC RX W HCPCS: Performed by: PSYCHIATRY & NEUROLOGY

## 2017-04-28 PROCEDURE — 99291 CRITICAL CARE FIRST HOUR: CPT | Performed by: INTERNAL MEDICINE

## 2017-04-28 PROCEDURE — 2580000003 HC RX 258: Performed by: EMERGENCY MEDICINE

## 2017-04-28 PROCEDURE — 6370000000 HC RX 637 (ALT 250 FOR IP): Performed by: NURSE PRACTITIONER

## 2017-04-28 PROCEDURE — 2500000003 HC RX 250 WO HCPCS: Performed by: PSYCHIATRY & NEUROLOGY

## 2017-04-28 PROCEDURE — 6360000002 HC RX W HCPCS

## 2017-04-28 PROCEDURE — 70496 CT ANGIOGRAPHY HEAD: CPT

## 2017-04-28 PROCEDURE — 6370000000 HC RX 637 (ALT 250 FOR IP): Performed by: NEUROLOGICAL SURGERY

## 2017-04-28 PROCEDURE — 2500000003 HC RX 250 WO HCPCS: Performed by: EMERGENCY MEDICINE

## 2017-04-28 PROCEDURE — 36600 WITHDRAWAL OF ARTERIAL BLOOD: CPT

## 2017-04-28 PROCEDURE — 93886 INTRACRANIAL COMPLETE STUDY: CPT

## 2017-04-28 PROCEDURE — 83735 ASSAY OF MAGNESIUM: CPT

## 2017-04-28 PROCEDURE — 94003 VENT MGMT INPAT SUBQ DAY: CPT

## 2017-04-28 PROCEDURE — 6360000002 HC RX W HCPCS: Performed by: EMERGENCY MEDICINE

## 2017-04-28 PROCEDURE — 6370000000 HC RX 637 (ALT 250 FOR IP): Performed by: PSYCHIATRY & NEUROLOGY

## 2017-04-28 PROCEDURE — 82947 ASSAY GLUCOSE BLOOD QUANT: CPT

## 2017-04-28 PROCEDURE — 85025 COMPLETE CBC W/AUTO DIFF WBC: CPT

## 2017-04-28 PROCEDURE — 2500000003 HC RX 250 WO HCPCS: Performed by: INTERNAL MEDICINE

## 2017-04-28 PROCEDURE — 99232 SBSQ HOSP IP/OBS MODERATE 35: CPT | Performed by: NURSE PRACTITIONER

## 2017-04-28 PROCEDURE — 70450 CT HEAD/BRAIN W/O DYE: CPT

## 2017-04-28 PROCEDURE — 80048 BASIC METABOLIC PNL TOTAL CA: CPT

## 2017-04-28 PROCEDURE — 2580000003 HC RX 258: Performed by: NURSE PRACTITIONER

## 2017-04-28 RX ORDER — MIDAZOLAM HYDROCHLORIDE 1 MG/ML
INJECTION INTRAMUSCULAR; INTRAVENOUS
Status: COMPLETED
Start: 2017-04-28 | End: 2017-04-28

## 2017-04-28 RX ORDER — MIDAZOLAM HYDROCHLORIDE 1 MG/ML
1 INJECTION INTRAMUSCULAR; INTRAVENOUS
Status: DISCONTINUED | OUTPATIENT
Start: 2017-04-28 | End: 2017-05-08 | Stop reason: HOSPADM

## 2017-04-28 RX ORDER — 0.9 % SODIUM CHLORIDE 0.9 %
1000 INTRAVENOUS SOLUTION INTRAVENOUS ONCE
Status: COMPLETED | OUTPATIENT
Start: 2017-04-28 | End: 2017-04-28

## 2017-04-28 RX ADMIN — MIDAZOLAM HYDROCHLORIDE 1 MG: 1 INJECTION, SOLUTION INTRAMUSCULAR; INTRAVENOUS at 08:48

## 2017-04-28 RX ADMIN — DEXMEDETOMIDINE HYDROCHLORIDE 0.2 MCG/KG/HR: 100 INJECTION, SOLUTION INTRAVENOUS at 10:06

## 2017-04-28 RX ADMIN — Medication 10 ML: at 08:58

## 2017-04-28 RX ADMIN — MIDAZOLAM HYDROCHLORIDE 1 MG: 1 INJECTION, SOLUTION INTRAMUSCULAR; INTRAVENOUS at 02:19

## 2017-04-28 RX ADMIN — INSULIN LISPRO 1 UNITS: 100 INJECTION, SOLUTION INTRAVENOUS; SUBCUTANEOUS at 18:16

## 2017-04-28 RX ADMIN — SODIUM CHLORIDE 1000 ML: 9 INJECTION, SOLUTION INTRAVENOUS at 20:02

## 2017-04-28 RX ADMIN — NIMODIPINE 30 MG: 60 SOLUTION ORAL at 13:12

## 2017-04-28 RX ADMIN — MAGNESIUM SULFATE HEPTAHYDRATE: 500 INJECTION, SOLUTION INTRAMUSCULAR; INTRAVENOUS at 05:55

## 2017-04-28 RX ADMIN — NIMODIPINE 30 MG: 60 SOLUTION ORAL at 08:55

## 2017-04-28 RX ADMIN — NIMODIPINE 30 MG: 60 SOLUTION ORAL at 21:00

## 2017-04-28 RX ADMIN — NOREPINEPHRINE BITARTRATE: 1 INJECTION, SOLUTION, CONCENTRATE INTRAVENOUS at 21:14

## 2017-04-28 RX ADMIN — FAMOTIDINE 20 MG: 10 INJECTION, SOLUTION INTRAVENOUS at 21:01

## 2017-04-28 RX ADMIN — FAMOTIDINE 20 MG: 10 INJECTION, SOLUTION INTRAVENOUS at 08:55

## 2017-04-28 RX ADMIN — CIPROFLOXACIN 400 MG: 2 INJECTION, SOLUTION INTRAVENOUS at 04:07

## 2017-04-28 RX ADMIN — NIMODIPINE 30 MG: 60 SOLUTION ORAL at 01:02

## 2017-04-28 RX ADMIN — HYDROMORPHONE HYDROCHLORIDE 0.5 MG: 1 INJECTION, SOLUTION INTRAMUSCULAR; INTRAVENOUS; SUBCUTANEOUS at 09:17

## 2017-04-28 RX ADMIN — NIMODIPINE 30 MG: 60 SOLUTION ORAL at 18:52

## 2017-04-28 RX ADMIN — MAGNESIUM SULFATE HEPTAHYDRATE: 500 INJECTION, SOLUTION INTRAMUSCULAR; INTRAVENOUS at 20:06

## 2017-04-28 RX ADMIN — NOREPINEPHRINE BITARTRATE: 1 INJECTION, SOLUTION, CONCENTRATE INTRAVENOUS at 01:18

## 2017-04-28 RX ADMIN — MAGNESIUM SULFATE HEPTAHYDRATE: 500 INJECTION, SOLUTION INTRAMUSCULAR; INTRAVENOUS at 16:43

## 2017-04-28 RX ADMIN — SIMVASTATIN 80 MG: 40 TABLET, FILM COATED ORAL at 21:01

## 2017-04-28 RX ADMIN — NIMODIPINE 30 MG: 60 SOLUTION ORAL at 07:02

## 2017-04-28 RX ADMIN — ENOXAPARIN SODIUM 30 MG: 100 INJECTION SUBCUTANEOUS at 21:01

## 2017-04-28 RX ADMIN — MIDAZOLAM HYDROCHLORIDE 1 MG: 1 INJECTION, SOLUTION INTRAMUSCULAR; INTRAVENOUS at 06:43

## 2017-04-28 RX ADMIN — OXYCODONE HYDROCHLORIDE AND ACETAMINOPHEN 2 TABLET: 5; 325 TABLET ORAL at 17:59

## 2017-04-28 RX ADMIN — LEVETIRACETAM 500 MG: 500 TABLET, FILM COATED ORAL at 21:01

## 2017-04-28 RX ADMIN — NIMODIPINE 30 MG: 60 SOLUTION ORAL at 05:13

## 2017-04-28 RX ADMIN — LIDOCAINE HYDROCHLORIDE 4 ML: 40 INJECTION, SOLUTION RETROBULBAR; TOPICAL at 00:21

## 2017-04-28 RX ADMIN — INSULIN LISPRO 1 UNITS: 100 INJECTION, SOLUTION INTRAVENOUS; SUBCUTANEOUS at 06:16

## 2017-04-28 RX ADMIN — MIDAZOLAM HYDROCHLORIDE 1 MG: 1 INJECTION, SOLUTION INTRAMUSCULAR; INTRAVENOUS at 04:38

## 2017-04-28 RX ADMIN — OXYCODONE HYDROCHLORIDE AND ACETAMINOPHEN 2 TABLET: 5; 325 TABLET ORAL at 22:04

## 2017-04-28 RX ADMIN — MIDAZOLAM HYDROCHLORIDE 1 MG: 1 INJECTION, SOLUTION INTRAMUSCULAR; INTRAVENOUS at 16:54

## 2017-04-28 RX ADMIN — NOREPINEPHRINE BITARTRATE: 1 INJECTION, SOLUTION, CONCENTRATE INTRAVENOUS at 11:26

## 2017-04-28 RX ADMIN — MAGNESIUM SULFATE HEPTAHYDRATE: 500 INJECTION, SOLUTION INTRAMUSCULAR; INTRAVENOUS at 15:09

## 2017-04-28 RX ADMIN — INSULIN LISPRO 1 UNITS: 100 INJECTION, SOLUTION INTRAVENOUS; SUBCUTANEOUS at 11:32

## 2017-04-28 RX ADMIN — HYDROMORPHONE HYDROCHLORIDE 0.5 MG: 1 INJECTION, SOLUTION INTRAMUSCULAR; INTRAVENOUS; SUBCUTANEOUS at 02:40

## 2017-04-28 RX ADMIN — NOREPINEPHRINE BITARTRATE: 1 INJECTION, SOLUTION, CONCENTRATE INTRAVENOUS at 06:05

## 2017-04-28 RX ADMIN — FENTANYL CITRATE 50 MCG: 50 INJECTION INTRAMUSCULAR; INTRAVENOUS at 08:48

## 2017-04-28 RX ADMIN — NOREPINEPHRINE BITARTRATE: 1 INJECTION, SOLUTION, CONCENTRATE INTRAVENOUS at 16:28

## 2017-04-28 RX ADMIN — ENOXAPARIN SODIUM 30 MG: 100 INJECTION SUBCUTANEOUS at 08:55

## 2017-04-28 RX ADMIN — INSULIN LISPRO 1 UNITS: 100 INJECTION, SOLUTION INTRAVENOUS; SUBCUTANEOUS at 01:07

## 2017-04-28 RX ADMIN — IOVERSOL 90 ML: 741 INJECTION INTRA-ARTERIAL; INTRAVENOUS at 14:03

## 2017-04-28 RX ADMIN — NIMODIPINE 30 MG: 60 SOLUTION ORAL at 17:47

## 2017-04-28 RX ADMIN — NIMODIPINE 30 MG: 60 SOLUTION ORAL at 03:00

## 2017-04-28 RX ADMIN — ASPIRIN 81 MG: 81 TABLET, CHEWABLE ORAL at 08:56

## 2017-04-28 RX ADMIN — NIMODIPINE 30 MG: 60 SOLUTION ORAL at 23:08

## 2017-04-28 RX ADMIN — LIDOCAINE HYDROCHLORIDE 4 ML: 40 INJECTION, SOLUTION RETROBULBAR; TOPICAL at 21:44

## 2017-04-28 RX ADMIN — NIMODIPINE 30 MG: 60 SOLUTION ORAL at 11:03

## 2017-04-28 RX ADMIN — OXYCODONE HYDROCHLORIDE AND ACETAMINOPHEN 2 TABLET: 5; 325 TABLET ORAL at 08:58

## 2017-04-28 RX ADMIN — FENTANYL CITRATE 50 MCG: 50 INJECTION INTRAMUSCULAR; INTRAVENOUS at 00:10

## 2017-04-28 RX ADMIN — OXYCODONE HYDROCHLORIDE AND ACETAMINOPHEN 2 TABLET: 5; 325 TABLET ORAL at 13:03

## 2017-04-28 RX ADMIN — NIMODIPINE 30 MG: 60 SOLUTION ORAL at 15:09

## 2017-04-28 RX ADMIN — LEVETIRACETAM 500 MG: 500 TABLET, FILM COATED ORAL at 08:55

## 2017-04-28 RX ADMIN — MIDAZOLAM HYDROCHLORIDE 1 MG: 1 INJECTION, SOLUTION INTRAMUSCULAR; INTRAVENOUS at 03:29

## 2017-04-28 RX ADMIN — MAGNESIUM SULFATE HEPTAHYDRATE: 500 INJECTION, SOLUTION INTRAMUSCULAR; INTRAVENOUS at 17:47

## 2017-04-28 ASSESSMENT — PULMONARY FUNCTION TESTS
PIF_VALUE: 13
PIF_VALUE: 12
PIF_VALUE: 20
PIF_VALUE: 18
PIF_VALUE: 22
PIF_VALUE: 12
PIF_VALUE: 12
PIF_VALUE: 21
PIF_VALUE: 19
PIF_VALUE: 11
PIF_VALUE: 8
PIF_VALUE: 19
PIF_VALUE: 12
PIF_VALUE: 13
PIF_VALUE: 15

## 2017-04-28 ASSESSMENT — PAIN SCALES - GENERAL
PAINLEVEL_OUTOF10: 2

## 2017-04-28 ASSESSMENT — PAIN SCALES - WONG BAKER
WONGBAKER_NUMERICALRESPONSE: 2

## 2017-04-29 ENCOUNTER — APPOINTMENT (OUTPATIENT)
Dept: CT IMAGING | Age: 57
DRG: 004 | End: 2017-04-29
Attending: NEUROLOGICAL SURGERY
Payer: MEDICARE

## 2017-04-29 ENCOUNTER — APPOINTMENT (OUTPATIENT)
Dept: GENERAL RADIOLOGY | Age: 57
DRG: 004 | End: 2017-04-29
Attending: NEUROLOGICAL SURGERY
Payer: MEDICARE

## 2017-04-29 LAB
ABSOLUTE EOS #: 0 K/UL (ref 0–0.4)
ABSOLUTE LYMPH #: 0.5 K/UL (ref 1–4.8)
ABSOLUTE MONO #: 0.33 K/UL (ref 0.1–1.2)
ANION GAP SERPL CALCULATED.3IONS-SCNC: 13 MMOL/L (ref 9–17)
BASOPHILS # BLD: 0 %
BASOPHILS ABSOLUTE: 0 K/UL (ref 0–0.2)
BUN BLDV-MCNC: 9 MG/DL (ref 6–20)
BUN/CREAT BLD: ABNORMAL (ref 9–20)
CALCIUM SERPL-MCNC: 7.5 MG/DL (ref 8.6–10.4)
CHLORIDE BLD-SCNC: 101 MMOL/L (ref 98–107)
CO2: 27 MMOL/L (ref 20–31)
CREAT SERPL-MCNC: 0.26 MG/DL (ref 0.5–0.9)
DIFFERENTIAL TYPE: ABNORMAL
EOSINOPHILS RELATIVE PERCENT: 0 %
FIO2: 30
GFR AFRICAN AMERICAN: >60 ML/MIN
GFR NON-AFRICAN AMERICAN: >60 ML/MIN
GFR SERPL CREATININE-BSD FRML MDRD: ABNORMAL ML/MIN/{1.73_M2}
GFR SERPL CREATININE-BSD FRML MDRD: ABNORMAL ML/MIN/{1.73_M2}
GLUCOSE BLD-MCNC: 169 MG/DL (ref 65–105)
GLUCOSE BLD-MCNC: 190 MG/DL (ref 70–99)
HCT VFR BLD CALC: 29.8 % (ref 36–46)
HEMOGLOBIN: 10.2 G/DL (ref 12–16)
LYMPHOCYTES # BLD: 6 %
MAGNESIUM: 2.5 MG/DL (ref 1.6–2.6)
MAGNESIUM: 2.6 MG/DL (ref 1.6–2.6)
MCH RBC QN AUTO: 31.5 PG (ref 26–34)
MCHC RBC AUTO-ENTMCNC: 34.3 G/DL (ref 31–37)
MCV RBC AUTO: 92.1 FL (ref 80–100)
MONOCYTES # BLD: 4 %
MORPHOLOGY: NORMAL
NEGATIVE BASE EXCESS, ART: ABNORMAL (ref 0–2)
O2 DEVICE/FLOW/%: ABNORMAL
PATIENT TEMP: 39.1
PDW BLD-RTO: 14.9 % (ref 12.5–15.4)
PLATELET # BLD: 332 K/UL (ref 140–450)
PLATELET ESTIMATE: ABNORMAL
PMV BLD AUTO: 6.6 FL (ref 6–12)
POC HCO3: 28.2 MMOL/L (ref 22–27)
POC O2 SATURATION: 98 %
POC PCO2 TEMP: 46 MM HG
POC PCO2: 42 MM HG (ref 32–45)
POC PH TEMP: 7.4
POC PH: 7.43 (ref 7.35–7.45)
POC PO2 TEMP: 111 MM HG
POC PO2: 97 MM HG (ref 75–95)
POSITIVE BASE EXCESS, ART: 4 (ref 0–2)
POTASSIUM SERPL-SCNC: 3.2 MMOL/L (ref 3.7–5.3)
RBC # BLD: 3.24 M/UL (ref 4–5.2)
RBC # BLD: ABNORMAL 10*6/UL
SEG NEUTROPHILS: 90 %
SEGMENTED NEUTROPHILS ABSOLUTE COUNT: 7.47 K/UL (ref 1.8–7.7)
SODIUM BLD-SCNC: 141 MMOL/L (ref 135–144)
TCO2 (CALC), ART: 29 MM HG (ref 23–28)
WBC # BLD: 8.3 K/UL (ref 3.5–11)
WBC # BLD: ABNORMAL 10*3/UL

## 2017-04-29 PROCEDURE — 70450 CT HEAD/BRAIN W/O DYE: CPT

## 2017-04-29 PROCEDURE — 6370000000 HC RX 637 (ALT 250 FOR IP): Performed by: EMERGENCY MEDICINE

## 2017-04-29 PROCEDURE — 6360000002 HC RX W HCPCS: Performed by: PSYCHIATRY & NEUROLOGY

## 2017-04-29 PROCEDURE — 82803 BLOOD GASES ANY COMBINATION: CPT

## 2017-04-29 PROCEDURE — 6370000000 HC RX 637 (ALT 250 FOR IP): Performed by: NEUROLOGICAL SURGERY

## 2017-04-29 PROCEDURE — 2580000003 HC RX 258: Performed by: EMERGENCY MEDICINE

## 2017-04-29 PROCEDURE — 36415 COLL VENOUS BLD VENIPUNCTURE: CPT

## 2017-04-29 PROCEDURE — 2000000003 HC NEURO ICU R&B

## 2017-04-29 PROCEDURE — 99231 SBSQ HOSP IP/OBS SF/LOW 25: CPT

## 2017-04-29 PROCEDURE — 2580000003 HC RX 258: Performed by: NEUROLOGICAL SURGERY

## 2017-04-29 PROCEDURE — 2500000003 HC RX 250 WO HCPCS: Performed by: INTERNAL MEDICINE

## 2017-04-29 PROCEDURE — S0028 INJECTION, FAMOTIDINE, 20 MG: HCPCS | Performed by: EMERGENCY MEDICINE

## 2017-04-29 PROCEDURE — 99233 SBSQ HOSP IP/OBS HIGH 50: CPT | Performed by: PSYCHIATRY & NEUROLOGY

## 2017-04-29 PROCEDURE — 99291 CRITICAL CARE FIRST HOUR: CPT | Performed by: INTERNAL MEDICINE

## 2017-04-29 PROCEDURE — 6360000002 HC RX W HCPCS: Performed by: EMERGENCY MEDICINE

## 2017-04-29 PROCEDURE — 94003 VENT MGMT INPAT SUBQ DAY: CPT

## 2017-04-29 PROCEDURE — 83735 ASSAY OF MAGNESIUM: CPT

## 2017-04-29 PROCEDURE — 6370000000 HC RX 637 (ALT 250 FOR IP): Performed by: NURSE PRACTITIONER

## 2017-04-29 PROCEDURE — 2580000003 HC RX 258: Performed by: PSYCHIATRY & NEUROLOGY

## 2017-04-29 PROCEDURE — 93886 INTRACRANIAL COMPLETE STUDY: CPT

## 2017-04-29 PROCEDURE — 2500000003 HC RX 250 WO HCPCS: Performed by: EMERGENCY MEDICINE

## 2017-04-29 PROCEDURE — 2580000003 HC RX 258: Performed by: NURSE PRACTITIONER

## 2017-04-29 PROCEDURE — 85025 COMPLETE CBC W/AUTO DIFF WBC: CPT

## 2017-04-29 PROCEDURE — 87040 BLOOD CULTURE FOR BACTERIA: CPT

## 2017-04-29 PROCEDURE — 2500000003 HC RX 250 WO HCPCS: Performed by: PSYCHIATRY & NEUROLOGY

## 2017-04-29 PROCEDURE — 82947 ASSAY GLUCOSE BLOOD QUANT: CPT

## 2017-04-29 PROCEDURE — 2500000003 HC RX 250 WO HCPCS: Performed by: NURSE PRACTITIONER

## 2017-04-29 PROCEDURE — 6370000000 HC RX 637 (ALT 250 FOR IP): Performed by: PSYCHIATRY & NEUROLOGY

## 2017-04-29 PROCEDURE — 94762 N-INVAS EAR/PLS OXIMTRY CONT: CPT

## 2017-04-29 PROCEDURE — 87493 C DIFF AMPLIFIED PROBE: CPT

## 2017-04-29 PROCEDURE — 6360000002 HC RX W HCPCS: Performed by: NEUROLOGICAL SURGERY

## 2017-04-29 PROCEDURE — 94770 HC ETCO2 MONITOR DAILY: CPT

## 2017-04-29 PROCEDURE — 71010 XR CHEST PORTABLE: CPT

## 2017-04-29 PROCEDURE — 87086 URINE CULTURE/COLONY COUNT: CPT

## 2017-04-29 PROCEDURE — 80048 BASIC METABOLIC PNL TOTAL CA: CPT

## 2017-04-29 PROCEDURE — 36600 WITHDRAWAL OF ARTERIAL BLOOD: CPT

## 2017-04-29 RX ORDER — FLUTICASONE PROPIONATE 50 MCG
1 SPRAY, SUSPENSION (ML) NASAL DAILY
Status: DISCONTINUED | OUTPATIENT
Start: 2017-04-30 | End: 2017-05-08 | Stop reason: HOSPADM

## 2017-04-29 RX ORDER — MAGNESIUM SULFATE 1 G/100ML
1 INJECTION INTRAVENOUS ONCE
Status: COMPLETED | OUTPATIENT
Start: 2017-04-29 | End: 2017-04-30

## 2017-04-29 RX ADMIN — MAGNESIUM SULFATE HEPTAHYDRATE: 500 INJECTION, SOLUTION INTRAMUSCULAR; INTRAVENOUS at 03:14

## 2017-04-29 RX ADMIN — MAGNESIUM SULFATE HEPTAHYDRATE: 500 INJECTION, SOLUTION INTRAMUSCULAR; INTRAVENOUS at 15:50

## 2017-04-29 RX ADMIN — MIDAZOLAM HYDROCHLORIDE 1 MG: 1 INJECTION, SOLUTION INTRAMUSCULAR; INTRAVENOUS at 03:51

## 2017-04-29 RX ADMIN — Medication 10 ML: at 08:26

## 2017-04-29 RX ADMIN — HYDROMORPHONE HYDROCHLORIDE 0.5 MG: 1 INJECTION, SOLUTION INTRAMUSCULAR; INTRAVENOUS; SUBCUTANEOUS at 19:46

## 2017-04-29 RX ADMIN — NIMODIPINE 30 MG: 60 SOLUTION ORAL at 01:02

## 2017-04-29 RX ADMIN — OXYCODONE HYDROCHLORIDE AND ACETAMINOPHEN 2 TABLET: 5; 325 TABLET ORAL at 20:04

## 2017-04-29 RX ADMIN — FAMOTIDINE 20 MG: 10 INJECTION, SOLUTION INTRAVENOUS at 08:21

## 2017-04-29 RX ADMIN — FAMOTIDINE 20 MG: 10 INJECTION, SOLUTION INTRAVENOUS at 20:05

## 2017-04-29 RX ADMIN — DEXMEDETOMIDINE HYDROCHLORIDE 0.7 MCG/KG/HR: 100 INJECTION, SOLUTION INTRAVENOUS at 04:57

## 2017-04-29 RX ADMIN — NIMODIPINE 30 MG: 60 SOLUTION ORAL at 15:50

## 2017-04-29 RX ADMIN — MIDAZOLAM HYDROCHLORIDE 1 MG: 1 INJECTION, SOLUTION INTRAMUSCULAR; INTRAVENOUS at 09:51

## 2017-04-29 RX ADMIN — ENOXAPARIN SODIUM 30 MG: 100 INJECTION SUBCUTANEOUS at 20:05

## 2017-04-29 RX ADMIN — ENOXAPARIN SODIUM 30 MG: 100 INJECTION SUBCUTANEOUS at 08:21

## 2017-04-29 RX ADMIN — INSULIN LISPRO 1 UNITS: 100 INJECTION, SOLUTION INTRAVENOUS; SUBCUTANEOUS at 18:10

## 2017-04-29 RX ADMIN — LEVETIRACETAM 500 MG: 500 TABLET, FILM COATED ORAL at 20:05

## 2017-04-29 RX ADMIN — OXYCODONE HYDROCHLORIDE AND ACETAMINOPHEN 2 TABLET: 5; 325 TABLET ORAL at 13:51

## 2017-04-29 RX ADMIN — NOREPINEPHRINE BITARTRATE: 1 INJECTION, SOLUTION, CONCENTRATE INTRAVENOUS at 07:02

## 2017-04-29 RX ADMIN — Medication 10 ML: at 08:25

## 2017-04-29 RX ADMIN — NIMODIPINE 30 MG: 60 SOLUTION ORAL at 11:12

## 2017-04-29 RX ADMIN — HYDROMORPHONE HYDROCHLORIDE 0.5 MG: 1 INJECTION, SOLUTION INTRAMUSCULAR; INTRAVENOUS; SUBCUTANEOUS at 01:24

## 2017-04-29 RX ADMIN — POTASSIUM CHLORIDE 40 MEQ: 40 SOLUTION ORAL at 09:51

## 2017-04-29 RX ADMIN — ASPIRIN 81 MG: 81 TABLET, CHEWABLE ORAL at 08:21

## 2017-04-29 RX ADMIN — NIMODIPINE 30 MG: 60 SOLUTION ORAL at 03:00

## 2017-04-29 RX ADMIN — NIMODIPINE 30 MG: 60 SOLUTION ORAL at 13:43

## 2017-04-29 RX ADMIN — NIMODIPINE 30 MG: 60 SOLUTION ORAL at 05:15

## 2017-04-29 RX ADMIN — NOREPINEPHRINE BITARTRATE: 1 INJECTION, SOLUTION, CONCENTRATE INTRAVENOUS at 01:36

## 2017-04-29 RX ADMIN — LEVETIRACETAM 500 MG: 500 TABLET, FILM COATED ORAL at 08:21

## 2017-04-29 RX ADMIN — HYDROMORPHONE HYDROCHLORIDE 0.5 MG: 1 INJECTION, SOLUTION INTRAMUSCULAR; INTRAVENOUS; SUBCUTANEOUS at 12:17

## 2017-04-29 RX ADMIN — NOREPINEPHRINE BITARTRATE: 1 INJECTION, SOLUTION, CONCENTRATE INTRAVENOUS at 21:50

## 2017-04-29 RX ADMIN — MAGNESIUM SULFATE HEPTAHYDRATE: 500 INJECTION, SOLUTION INTRAMUSCULAR; INTRAVENOUS at 17:33

## 2017-04-29 RX ADMIN — NIMODIPINE 30 MG: 60 SOLUTION ORAL at 17:36

## 2017-04-29 RX ADMIN — MAGNESIUM SULFATE IN DEXTROSE 1 G: 10 INJECTION, SOLUTION INTRAVENOUS at 23:18

## 2017-04-29 RX ADMIN — NIMODIPINE 30 MG: 60 SOLUTION ORAL at 08:23

## 2017-04-29 RX ADMIN — HYDROMORPHONE HYDROCHLORIDE 0.5 MG: 1 INJECTION, SOLUTION INTRAMUSCULAR; INTRAVENOUS; SUBCUTANEOUS at 08:17

## 2017-04-29 RX ADMIN — SIMVASTATIN 80 MG: 40 TABLET, FILM COATED ORAL at 20:04

## 2017-04-29 RX ADMIN — INSULIN LISPRO 1 UNITS: 100 INJECTION, SOLUTION INTRAVENOUS; SUBCUTANEOUS at 00:39

## 2017-04-29 RX ADMIN — NIMODIPINE 30 MG: 60 SOLUTION ORAL at 21:50

## 2017-04-29 RX ADMIN — INSULIN LISPRO 1 UNITS: 100 INJECTION, SOLUTION INTRAVENOUS; SUBCUTANEOUS at 08:27

## 2017-04-29 RX ADMIN — DEXMEDETOMIDINE HYDROCHLORIDE 0.7 MCG/KG/HR: 100 INJECTION, SOLUTION INTRAVENOUS at 13:45

## 2017-04-29 RX ADMIN — OXYCODONE HYDROCHLORIDE AND ACETAMINOPHEN 2 TABLET: 5; 325 TABLET ORAL at 09:51

## 2017-04-29 RX ADMIN — OXYCODONE HYDROCHLORIDE AND ACETAMINOPHEN 2 TABLET: 5; 325 TABLET ORAL at 04:18

## 2017-04-29 RX ADMIN — INSULIN LISPRO 1 UNITS: 100 INJECTION, SOLUTION INTRAVENOUS; SUBCUTANEOUS at 12:16

## 2017-04-29 RX ADMIN — MAGNESIUM SULFATE HEPTAHYDRATE: 500 INJECTION, SOLUTION INTRAMUSCULAR; INTRAVENOUS at 11:12

## 2017-04-29 RX ADMIN — MAGNESIUM SULFATE HEPTAHYDRATE: 500 INJECTION, SOLUTION INTRAMUSCULAR; INTRAVENOUS at 13:42

## 2017-04-29 RX ADMIN — LIDOCAINE HYDROCHLORIDE 4 ML: 40 INJECTION, SOLUTION RETROBULBAR; TOPICAL at 17:06

## 2017-04-29 RX ADMIN — NIMODIPINE 30 MG: 60 SOLUTION ORAL at 19:46

## 2017-04-29 RX ADMIN — NIMODIPINE 30 MG: 60 SOLUTION ORAL at 23:19

## 2017-04-29 RX ADMIN — NIMODIPINE 30 MG: 60 SOLUTION ORAL at 06:50

## 2017-04-29 RX ADMIN — NOREPINEPHRINE BITARTRATE: 1 INJECTION, SOLUTION, CONCENTRATE INTRAVENOUS at 13:43

## 2017-04-29 ASSESSMENT — PULMONARY FUNCTION TESTS
PIF_VALUE: 24
PIF_VALUE: 21
PIF_VALUE: 14
PIF_VALUE: 25
PIF_VALUE: 25
PIF_VALUE: 22
PIF_VALUE: 11
PIF_VALUE: 9
PIF_VALUE: 25
PIF_VALUE: 34
PIF_VALUE: 15
PIF_VALUE: 23
PIF_VALUE: 25
PIF_VALUE: 23
PIF_VALUE: 12
PIF_VALUE: 23
PIF_VALUE: 12
PIF_VALUE: 12
PIF_VALUE: 20
PIF_VALUE: 29
PIF_VALUE: 12
PIF_VALUE: 27
PIF_VALUE: 30
PIF_VALUE: 24
PIF_VALUE: 29

## 2017-04-30 ENCOUNTER — APPOINTMENT (OUTPATIENT)
Dept: GENERAL RADIOLOGY | Age: 57
DRG: 004 | End: 2017-04-30
Attending: NEUROLOGICAL SURGERY
Payer: MEDICARE

## 2017-04-30 ENCOUNTER — APPOINTMENT (OUTPATIENT)
Dept: CT IMAGING | Age: 57
DRG: 004 | End: 2017-04-30
Attending: NEUROLOGICAL SURGERY
Payer: MEDICARE

## 2017-04-30 LAB
ABSOLUTE EOS #: 0 K/UL (ref 0–0.4)
ABSOLUTE LYMPH #: 0.4 K/UL (ref 1–4.8)
ABSOLUTE MONO #: 0.4 K/UL (ref 0.1–1.2)
ANION GAP SERPL CALCULATED.3IONS-SCNC: 12 MMOL/L (ref 9–17)
BASOPHILS # BLD: 0 %
BASOPHILS ABSOLUTE: 0 K/UL (ref 0–0.2)
BUN BLDV-MCNC: 11 MG/DL (ref 6–20)
BUN/CREAT BLD: ABNORMAL (ref 9–20)
C DIFFICILE TOXINS, PCR: NORMAL
CALCIUM SERPL-MCNC: 7.6 MG/DL (ref 8.6–10.4)
CHLORIDE BLD-SCNC: 98 MMOL/L (ref 98–107)
CHOLESTEROL/HDL RATIO: 3.1
CHOLESTEROL: 119 MG/DL
CO2: 26 MMOL/L (ref 20–31)
CREAT SERPL-MCNC: 0.23 MG/DL (ref 0.5–0.9)
CULTURE: NO GROWTH
CULTURE: NORMAL
DIFFERENTIAL TYPE: ABNORMAL
EOSINOPHILS RELATIVE PERCENT: 0 %
FIO2: 30
FIO2: 55
GFR AFRICAN AMERICAN: >60 ML/MIN
GFR NON-AFRICAN AMERICAN: >60 ML/MIN
GFR SERPL CREATININE-BSD FRML MDRD: ABNORMAL ML/MIN/{1.73_M2}
GFR SERPL CREATININE-BSD FRML MDRD: ABNORMAL ML/MIN/{1.73_M2}
GLUCOSE BLD-MCNC: 127 MG/DL (ref 65–105)
GLUCOSE BLD-MCNC: 136 MG/DL (ref 65–105)
GLUCOSE BLD-MCNC: 149 MG/DL (ref 65–105)
GLUCOSE BLD-MCNC: 153 MG/DL (ref 65–105)
GLUCOSE BLD-MCNC: 162 MG/DL (ref 65–105)
GLUCOSE BLD-MCNC: 163 MG/DL (ref 65–105)
GLUCOSE BLD-MCNC: 164 MG/DL (ref 70–99)
GLUCOSE BLD-MCNC: 168 MG/DL (ref 65–105)
HCT VFR BLD CALC: 29.8 % (ref 36–46)
HDLC SERPL-MCNC: 38 MG/DL
HEMOGLOBIN: 10.1 G/DL (ref 12–16)
LDL CHOLESTEROL: 64 MG/DL (ref 0–130)
LYMPHOCYTES # BLD: 4 %
Lab: NORMAL
MAGNESIUM: 1.9 MG/DL (ref 1.6–2.6)
MAGNESIUM: 2.4 MG/DL (ref 1.6–2.6)
MCH RBC QN AUTO: 31.9 PG (ref 26–34)
MCHC RBC AUTO-ENTMCNC: 33.9 G/DL (ref 31–37)
MCV RBC AUTO: 93.9 FL (ref 80–100)
MONOCYTES # BLD: 4 %
MORPHOLOGY: NORMAL
NEGATIVE BASE EXCESS, ART: ABNORMAL (ref 0–2)
NEGATIVE BASE EXCESS, ART: ABNORMAL (ref 0–2)
O2 DEVICE/FLOW/%: ABNORMAL
O2 DEVICE/FLOW/%: ABNORMAL
PATIENT TEMP: 37.6
PATIENT TEMP: 38.2
PDW BLD-RTO: 15.4 % (ref 12.5–15.4)
PLATELET # BLD: 341 K/UL (ref 140–450)
PLATELET ESTIMATE: ABNORMAL
PMV BLD AUTO: 6.5 FL (ref 6–12)
POC HCO3: 28.8 MMOL/L (ref 22–27)
POC HCO3: 29.8 MMOL/L (ref 22–27)
POC O2 SATURATION: 95 %
POC O2 SATURATION: 96 %
POC PCO2 TEMP: ABNORMAL MM HG
POC PCO2 TEMP: ABNORMAL MM HG
POC PCO2: 39 MM HG (ref 32–45)
POC PCO2: 39 MM HG (ref 32–45)
POC PH TEMP: ABNORMAL
POC PH TEMP: ABNORMAL
POC PH: 7.48 (ref 7.35–7.45)
POC PH: 7.49 (ref 7.35–7.45)
POC PO2 TEMP: ABNORMAL MM HG
POC PO2 TEMP: ABNORMAL MM HG
POC PO2: 71 MM HG (ref 75–95)
POC PO2: 73 MM HG (ref 75–95)
POSITIVE BASE EXCESS, ART: 5 (ref 0–2)
POSITIVE BASE EXCESS, ART: 6 (ref 0–2)
POTASSIUM SERPL-SCNC: 3 MMOL/L (ref 3.7–5.3)
RBC # BLD: 3.17 M/UL (ref 4–5.2)
RBC # BLD: ABNORMAL 10*6/UL
SEG NEUTROPHILS: 92 %
SEGMENTED NEUTROPHILS ABSOLUTE COUNT: 9.1 K/UL (ref 1.8–7.7)
SODIUM BLD-SCNC: 136 MMOL/L (ref 135–144)
SPECIMEN DESCRIPTION: NORMAL
SPECIMEN DESCRIPTION: NORMAL
STATUS: NORMAL
TCO2 (CALC), ART: 30 MM HG (ref 23–28)
TCO2 (CALC), ART: 31 MM HG (ref 23–28)
TRIGL SERPL-MCNC: 83 MG/DL
VLDLC SERPL CALC-MCNC: ABNORMAL MG/DL (ref 1–30)
WBC # BLD: 9.9 K/UL (ref 3.5–11)
WBC # BLD: ABNORMAL 10*3/UL

## 2017-04-30 PROCEDURE — 6360000002 HC RX W HCPCS: Performed by: NEUROLOGICAL SURGERY

## 2017-04-30 PROCEDURE — 2580000003 HC RX 258: Performed by: PSYCHIATRY & NEUROLOGY

## 2017-04-30 PROCEDURE — 82947 ASSAY GLUCOSE BLOOD QUANT: CPT

## 2017-04-30 PROCEDURE — 85025 COMPLETE CBC W/AUTO DIFF WBC: CPT

## 2017-04-30 PROCEDURE — 82803 BLOOD GASES ANY COMBINATION: CPT

## 2017-04-30 PROCEDURE — 2500000003 HC RX 250 WO HCPCS: Performed by: INTERNAL MEDICINE

## 2017-04-30 PROCEDURE — 93886 INTRACRANIAL COMPLETE STUDY: CPT

## 2017-04-30 PROCEDURE — 6370000000 HC RX 637 (ALT 250 FOR IP): Performed by: NURSE PRACTITIONER

## 2017-04-30 PROCEDURE — 2580000003 HC RX 258: Performed by: NURSE PRACTITIONER

## 2017-04-30 PROCEDURE — 6370000000 HC RX 637 (ALT 250 FOR IP): Performed by: EMERGENCY MEDICINE

## 2017-04-30 PROCEDURE — 36600 WITHDRAWAL OF ARTERIAL BLOOD: CPT

## 2017-04-30 PROCEDURE — 2500000003 HC RX 250 WO HCPCS: Performed by: NURSE PRACTITIONER

## 2017-04-30 PROCEDURE — S0028 INJECTION, FAMOTIDINE, 20 MG: HCPCS | Performed by: EMERGENCY MEDICINE

## 2017-04-30 PROCEDURE — 6370000000 HC RX 637 (ALT 250 FOR IP): Performed by: PSYCHIATRY & NEUROLOGY

## 2017-04-30 PROCEDURE — 2580000003 HC RX 258: Performed by: INTERNAL MEDICINE

## 2017-04-30 PROCEDURE — 2500000003 HC RX 250 WO HCPCS: Performed by: PSYCHIATRY & NEUROLOGY

## 2017-04-30 PROCEDURE — 2000000003 HC NEURO ICU R&B

## 2017-04-30 PROCEDURE — 6360000002 HC RX W HCPCS: Performed by: PSYCHIATRY & NEUROLOGY

## 2017-04-30 PROCEDURE — 87205 SMEAR GRAM STAIN: CPT

## 2017-04-30 PROCEDURE — 80048 BASIC METABOLIC PNL TOTAL CA: CPT

## 2017-04-30 PROCEDURE — 6360000002 HC RX W HCPCS: Performed by: INTERNAL MEDICINE

## 2017-04-30 PROCEDURE — 6370000000 HC RX 637 (ALT 250 FOR IP): Performed by: NEUROLOGICAL SURGERY

## 2017-04-30 PROCEDURE — 6370000000 HC RX 637 (ALT 250 FOR IP): Performed by: INTERNAL MEDICINE

## 2017-04-30 PROCEDURE — 83735 ASSAY OF MAGNESIUM: CPT

## 2017-04-30 PROCEDURE — 2580000003 HC RX 258: Performed by: EMERGENCY MEDICINE

## 2017-04-30 PROCEDURE — 36415 COLL VENOUS BLD VENIPUNCTURE: CPT

## 2017-04-30 PROCEDURE — 6360000002 HC RX W HCPCS: Performed by: EMERGENCY MEDICINE

## 2017-04-30 PROCEDURE — 99232 SBSQ HOSP IP/OBS MODERATE 35: CPT | Performed by: NURSE PRACTITIONER

## 2017-04-30 PROCEDURE — 83036 HEMOGLOBIN GLYCOSYLATED A1C: CPT

## 2017-04-30 PROCEDURE — 80061 LIPID PANEL: CPT

## 2017-04-30 PROCEDURE — 99291 CRITICAL CARE FIRST HOUR: CPT | Performed by: INTERNAL MEDICINE

## 2017-04-30 PROCEDURE — 70450 CT HEAD/BRAIN W/O DYE: CPT

## 2017-04-30 PROCEDURE — 71010 XR CHEST PORTABLE: CPT

## 2017-04-30 PROCEDURE — 2500000003 HC RX 250 WO HCPCS: Performed by: EMERGENCY MEDICINE

## 2017-04-30 PROCEDURE — 94770 HC ETCO2 MONITOR DAILY: CPT

## 2017-04-30 PROCEDURE — 87070 CULTURE OTHR SPECIMN AEROBIC: CPT

## 2017-04-30 PROCEDURE — 2580000003 HC RX 258: Performed by: NEUROLOGICAL SURGERY

## 2017-04-30 PROCEDURE — 94762 N-INVAS EAR/PLS OXIMTRY CONT: CPT

## 2017-04-30 PROCEDURE — 99233 SBSQ HOSP IP/OBS HIGH 50: CPT | Performed by: PSYCHIATRY & NEUROLOGY

## 2017-04-30 PROCEDURE — 94003 VENT MGMT INPAT SUBQ DAY: CPT

## 2017-04-30 RX ORDER — FENTANYL CITRATE 50 UG/ML
50 INJECTION, SOLUTION INTRAMUSCULAR; INTRAVENOUS
Status: DISCONTINUED | OUTPATIENT
Start: 2017-04-30 | End: 2017-04-30

## 2017-04-30 RX ORDER — OXYCODONE HYDROCHLORIDE AND ACETAMINOPHEN 5; 325 MG/1; MG/1
2 TABLET ORAL EVERY 6 HOURS PRN
Status: DISCONTINUED | OUTPATIENT
Start: 2017-04-30 | End: 2017-04-30

## 2017-04-30 RX ORDER — OXYCODONE HYDROCHLORIDE 5 MG/1
10 TABLET ORAL EVERY 4 HOURS PRN
Status: DISCONTINUED | OUTPATIENT
Start: 2017-04-30 | End: 2017-05-08 | Stop reason: HOSPADM

## 2017-04-30 RX ORDER — VANCOMYCIN HYDROCHLORIDE 1 G/200ML
1000 INJECTION, SOLUTION INTRAVENOUS EVERY 12 HOURS
Status: DISCONTINUED | OUTPATIENT
Start: 2017-04-30 | End: 2017-05-01

## 2017-04-30 RX ORDER — FENTANYL CITRATE 50 UG/ML
100 INJECTION, SOLUTION INTRAMUSCULAR; INTRAVENOUS
Status: DISCONTINUED | OUTPATIENT
Start: 2017-04-30 | End: 2017-05-08 | Stop reason: HOSPADM

## 2017-04-30 RX ORDER — OXYCODONE HYDROCHLORIDE 5 MG/1
5 TABLET ORAL EVERY 4 HOURS PRN
Status: DISCONTINUED | OUTPATIENT
Start: 2017-04-30 | End: 2017-05-08 | Stop reason: HOSPADM

## 2017-04-30 RX ORDER — FENTANYL CITRATE 50 UG/ML
100 INJECTION, SOLUTION INTRAMUSCULAR; INTRAVENOUS
Status: DISCONTINUED | OUTPATIENT
Start: 2017-04-30 | End: 2017-04-30

## 2017-04-30 RX ORDER — FENTANYL CITRATE 50 UG/ML
50 INJECTION, SOLUTION INTRAMUSCULAR; INTRAVENOUS
Status: DISCONTINUED | OUTPATIENT
Start: 2017-04-30 | End: 2017-05-08 | Stop reason: HOSPADM

## 2017-04-30 RX ORDER — BROMOCRIPTINE MESYLATE 2.5 MG/1
5 TABLET ORAL 2 TIMES DAILY
Status: DISCONTINUED | OUTPATIENT
Start: 2017-04-30 | End: 2017-05-08 | Stop reason: HOSPADM

## 2017-04-30 RX ADMIN — OXYCODONE HYDROCHLORIDE AND ACETAMINOPHEN 2 TABLET: 5; 325 TABLET ORAL at 13:13

## 2017-04-30 RX ADMIN — ASPIRIN 81 MG: 81 TABLET, CHEWABLE ORAL at 15:03

## 2017-04-30 RX ADMIN — FAMOTIDINE 20 MG: 10 INJECTION, SOLUTION INTRAVENOUS at 21:18

## 2017-04-30 RX ADMIN — ONDANSETRON 4 MG: 2 INJECTION, SOLUTION INTRAMUSCULAR; INTRAVENOUS at 19:45

## 2017-04-30 RX ADMIN — LEVETIRACETAM 500 MG: 500 TABLET, FILM COATED ORAL at 08:12

## 2017-04-30 RX ADMIN — SIMVASTATIN 80 MG: 40 TABLET, FILM COATED ORAL at 21:19

## 2017-04-30 RX ADMIN — NIMODIPINE 30 MG: 60 SOLUTION ORAL at 06:58

## 2017-04-30 RX ADMIN — OXYCODONE HYDROCHLORIDE AND ACETAMINOPHEN 2 TABLET: 5; 325 TABLET ORAL at 02:28

## 2017-04-30 RX ADMIN — INSULIN LISPRO 1 UNITS: 100 INJECTION, SOLUTION INTRAVENOUS; SUBCUTANEOUS at 00:22

## 2017-04-30 RX ADMIN — NIMODIPINE 30 MG: 60 SOLUTION ORAL at 01:14

## 2017-04-30 RX ADMIN — VANCOMYCIN HYDROCHLORIDE 1000 MG: 1 INJECTION, SOLUTION INTRAVENOUS at 23:45

## 2017-04-30 RX ADMIN — HYDROMORPHONE HYDROCHLORIDE 0.5 MG: 1 INJECTION, SOLUTION INTRAMUSCULAR; INTRAVENOUS; SUBCUTANEOUS at 00:11

## 2017-04-30 RX ADMIN — SODIUM CHLORIDE: 9 INJECTION, SOLUTION INTRAVENOUS at 08:18

## 2017-04-30 RX ADMIN — NIMODIPINE 30 MG: 60 SOLUTION ORAL at 21:18

## 2017-04-30 RX ADMIN — FENTANYL CITRATE 100 MCG: 50 INJECTION INTRAMUSCULAR; INTRAVENOUS at 11:10

## 2017-04-30 RX ADMIN — FLUTICASONE PROPIONATE 1 SPRAY: 50 SPRAY, METERED NASAL at 08:12

## 2017-04-30 RX ADMIN — VANCOMYCIN HYDROCHLORIDE 1000 MG: 1 INJECTION, SOLUTION INTRAVENOUS at 13:10

## 2017-04-30 RX ADMIN — MAGNESIUM SULFATE HEPTAHYDRATE: 500 INJECTION, SOLUTION INTRAMUSCULAR; INTRAVENOUS at 07:21

## 2017-04-30 RX ADMIN — AZTREONAM 1 G: 1 INJECTION, POWDER, LYOPHILIZED, FOR SOLUTION INTRAMUSCULAR; INTRAVENOUS at 12:38

## 2017-04-30 RX ADMIN — NIMODIPINE 30 MG: 60 SOLUTION ORAL at 02:56

## 2017-04-30 RX ADMIN — FENTANYL CITRATE 50 MCG: 50 INJECTION INTRAMUSCULAR; INTRAVENOUS at 05:23

## 2017-04-30 RX ADMIN — NOREPINEPHRINE BITARTRATE: 1 INJECTION, SOLUTION, CONCENTRATE INTRAVENOUS at 14:57

## 2017-04-30 RX ADMIN — FAMOTIDINE 20 MG: 10 INJECTION, SOLUTION INTRAVENOUS at 08:13

## 2017-04-30 RX ADMIN — AZTREONAM 1 G: 1 INJECTION, POWDER, LYOPHILIZED, FOR SOLUTION INTRAMUSCULAR; INTRAVENOUS at 20:24

## 2017-04-30 RX ADMIN — INSULIN LISPRO 1 UNITS: 100 INJECTION, SOLUTION INTRAVENOUS; SUBCUTANEOUS at 11:35

## 2017-04-30 RX ADMIN — NIMODIPINE 30 MG: 60 SOLUTION ORAL at 05:30

## 2017-04-30 RX ADMIN — NIMODIPINE 30 MG: 60 SOLUTION ORAL at 23:12

## 2017-04-30 RX ADMIN — Medication 10 ML: at 21:28

## 2017-04-30 RX ADMIN — INSULIN LISPRO 1 UNITS: 100 INJECTION, SOLUTION INTRAVENOUS; SUBCUTANEOUS at 06:25

## 2017-04-30 RX ADMIN — FENTANYL CITRATE 100 MCG: 50 INJECTION INTRAMUSCULAR; INTRAVENOUS at 13:44

## 2017-04-30 RX ADMIN — NIMODIPINE 30 MG: 60 SOLUTION ORAL at 15:47

## 2017-04-30 RX ADMIN — NOREPINEPHRINE BITARTRATE: 1 INJECTION, SOLUTION, CONCENTRATE INTRAVENOUS at 03:36

## 2017-04-30 RX ADMIN — FENTANYL CITRATE 100 MCG: 50 INJECTION INTRAMUSCULAR; INTRAVENOUS at 12:13

## 2017-04-30 RX ADMIN — FENTANYL CITRATE 100 MCG: 50 INJECTION INTRAMUSCULAR; INTRAVENOUS at 23:20

## 2017-04-30 RX ADMIN — LEVETIRACETAM 250 MG: 250 TABLET, FILM COATED ORAL at 21:19

## 2017-04-30 RX ADMIN — NIMODIPINE 30 MG: 60 SOLUTION ORAL at 17:37

## 2017-04-30 RX ADMIN — FENTANYL CITRATE 100 MCG: 50 INJECTION INTRAMUSCULAR; INTRAVENOUS at 20:24

## 2017-04-30 RX ADMIN — ACETAMINOPHEN 650 MG: 325 TABLET ORAL at 08:13

## 2017-04-30 RX ADMIN — FENTANYL CITRATE 100 MCG: 50 INJECTION INTRAMUSCULAR; INTRAVENOUS at 09:00

## 2017-04-30 RX ADMIN — BROMOCRIPTINE MESYLATE 5 MG: 2.5 TABLET ORAL at 15:03

## 2017-04-30 RX ADMIN — POTASSIUM CHLORIDE 20 MEQ: 29.8 INJECTION, SOLUTION INTRAVENOUS at 11:02

## 2017-04-30 RX ADMIN — POTASSIUM CHLORIDE 20 MEQ: 29.8 INJECTION, SOLUTION INTRAVENOUS at 09:03

## 2017-04-30 RX ADMIN — FENTANYL CITRATE 50 MCG: 50 INJECTION INTRAMUSCULAR; INTRAVENOUS at 00:42

## 2017-04-30 RX ADMIN — HYDROMORPHONE HYDROCHLORIDE 0.5 MG: 1 INJECTION, SOLUTION INTRAMUSCULAR; INTRAVENOUS; SUBCUTANEOUS at 10:32

## 2017-04-30 RX ADMIN — OXYCODONE HYDROCHLORIDE 10 MG: 5 TABLET ORAL at 21:19

## 2017-04-30 RX ADMIN — NIMODIPINE 30 MG: 60 SOLUTION ORAL at 13:09

## 2017-04-30 RX ADMIN — DEXMEDETOMIDINE HYDROCHLORIDE 0.6 MCG/KG/HR: 100 INJECTION, SOLUTION INTRAVENOUS at 02:19

## 2017-04-30 RX ADMIN — ENOXAPARIN SODIUM 30 MG: 100 INJECTION SUBCUTANEOUS at 21:18

## 2017-04-30 RX ADMIN — FENTANYL CITRATE 100 MCG: 50 INJECTION INTRAMUSCULAR; INTRAVENOUS at 17:37

## 2017-04-30 RX ADMIN — BROMOCRIPTINE MESYLATE 5 MG: 2.5 TABLET ORAL at 21:20

## 2017-04-30 RX ADMIN — MAGNESIUM SULFATE HEPTAHYDRATE: 500 INJECTION, SOLUTION INTRAMUSCULAR; INTRAVENOUS at 13:38

## 2017-04-30 RX ADMIN — ENOXAPARIN SODIUM 30 MG: 100 INJECTION SUBCUTANEOUS at 15:03

## 2017-04-30 RX ADMIN — POTASSIUM CHLORIDE 20 MEQ: 29.8 INJECTION, SOLUTION INTRAVENOUS at 10:00

## 2017-04-30 RX ADMIN — FENTANYL CITRATE 50 MCG: 50 INJECTION INTRAMUSCULAR; INTRAVENOUS at 07:54

## 2017-04-30 ASSESSMENT — PULMONARY FUNCTION TESTS
PIF_VALUE: 15
PIF_VALUE: 24
PIF_VALUE: 31
PIF_VALUE: 27
PIF_VALUE: 34
PIF_VALUE: 27
PIF_VALUE: 39
PIF_VALUE: 19
PIF_VALUE: 36
PIF_VALUE: 30
PIF_VALUE: 26
PIF_VALUE: 25
PIF_VALUE: 40
PIF_VALUE: 28
PIF_VALUE: 32
PIF_VALUE: 30

## 2017-04-30 ASSESSMENT — PAIN SCALES - GENERAL: PAINLEVEL_OUTOF10: 0

## 2017-05-01 ENCOUNTER — APPOINTMENT (OUTPATIENT)
Dept: CT IMAGING | Age: 57
DRG: 004 | End: 2017-05-01
Attending: NEUROLOGICAL SURGERY
Payer: MEDICARE

## 2017-05-01 ENCOUNTER — APPOINTMENT (OUTPATIENT)
Dept: GENERAL RADIOLOGY | Age: 57
DRG: 004 | End: 2017-05-01
Attending: NEUROLOGICAL SURGERY
Payer: MEDICARE

## 2017-05-01 LAB
ABSOLUTE EOS #: 0 K/UL (ref 0–0.4)
ABSOLUTE LYMPH #: 0.6 K/UL (ref 1–4.8)
ABSOLUTE MONO #: 0.3 K/UL (ref 0.1–1.2)
ANION GAP SERPL CALCULATED.3IONS-SCNC: 12 MMOL/L (ref 9–17)
BASOPHILS # BLD: 0 %
BASOPHILS ABSOLUTE: 0 K/UL (ref 0–0.2)
BUN BLDV-MCNC: 11 MG/DL (ref 6–20)
BUN/CREAT BLD: ABNORMAL (ref 9–20)
CALCIUM SERPL-MCNC: 7.6 MG/DL (ref 8.6–10.4)
CHLORIDE BLD-SCNC: 101 MMOL/L (ref 98–107)
CO2: 28 MMOL/L (ref 20–31)
CREAT SERPL-MCNC: 0.29 MG/DL (ref 0.5–0.9)
DIFFERENTIAL TYPE: ABNORMAL
EOSINOPHILS RELATIVE PERCENT: 0 %
ESTIMATED AVERAGE GLUCOSE: 114 MG/DL
FIO2: 30
GFR AFRICAN AMERICAN: >60 ML/MIN
GFR NON-AFRICAN AMERICAN: >60 ML/MIN
GFR SERPL CREATININE-BSD FRML MDRD: ABNORMAL ML/MIN/{1.73_M2}
GFR SERPL CREATININE-BSD FRML MDRD: ABNORMAL ML/MIN/{1.73_M2}
GLUCOSE BLD-MCNC: 117 MG/DL (ref 65–105)
GLUCOSE BLD-MCNC: 146 MG/DL (ref 70–99)
GLUCOSE BLD-MCNC: 154 MG/DL (ref 65–105)
HBA1C MFR BLD: 5.6 % (ref 4–6)
HCT VFR BLD CALC: 27.2 % (ref 36–46)
HEMOGLOBIN: 9.1 G/DL (ref 12–16)
LYMPHOCYTES # BLD: 8 %
MAGNESIUM: 1.7 MG/DL (ref 1.6–2.6)
MAGNESIUM: 1.7 MG/DL (ref 1.6–2.6)
MCH RBC QN AUTO: 31.5 PG (ref 26–34)
MCHC RBC AUTO-ENTMCNC: 33.3 G/DL (ref 31–37)
MCV RBC AUTO: 94.3 FL (ref 80–100)
MONOCYTES # BLD: 4 %
NEGATIVE BASE EXCESS, ART: ABNORMAL (ref 0–2)
O2 DEVICE/FLOW/%: ABNORMAL
PATIENT TEMP: 38.3
PDW BLD-RTO: 15.5 % (ref 12.5–15.4)
PLATELET # BLD: 306 K/UL (ref 140–450)
PLATELET ESTIMATE: ABNORMAL
PMV BLD AUTO: 6.7 FL (ref 6–12)
POC HCO3: 29.8 MMOL/L (ref 22–27)
POC O2 SATURATION: 89 %
POC PCO2 TEMP: ABNORMAL MM HG
POC PCO2: 45 MM HG (ref 32–45)
POC PH TEMP: ABNORMAL
POC PH: 7.43 (ref 7.35–7.45)
POC PO2 TEMP: ABNORMAL MM HG
POC PO2: 55 MM HG (ref 75–95)
POSITIVE BASE EXCESS, ART: 5 (ref 0–2)
POTASSIUM SERPL-SCNC: 3.3 MMOL/L (ref 3.7–5.3)
RBC # BLD: 2.89 M/UL (ref 4–5.2)
RBC # BLD: ABNORMAL 10*6/UL
SEG NEUTROPHILS: 88 %
SEGMENTED NEUTROPHILS ABSOLUTE COUNT: 6.9 K/UL (ref 1.8–7.7)
SODIUM BLD-SCNC: 141 MMOL/L (ref 135–144)
TCO2 (CALC), ART: 31 MM HG (ref 23–28)
WBC # BLD: 7.8 K/UL (ref 3.5–11)
WBC # BLD: ABNORMAL 10*3/UL

## 2017-05-01 PROCEDURE — 6370000000 HC RX 637 (ALT 250 FOR IP): Performed by: EMERGENCY MEDICINE

## 2017-05-01 PROCEDURE — 70450 CT HEAD/BRAIN W/O DYE: CPT

## 2017-05-01 PROCEDURE — 6360000002 HC RX W HCPCS: Performed by: EMERGENCY MEDICINE

## 2017-05-01 PROCEDURE — 99233 SBSQ HOSP IP/OBS HIGH 50: CPT | Performed by: PSYCHIATRY & NEUROLOGY

## 2017-05-01 PROCEDURE — 2500000003 HC RX 250 WO HCPCS: Performed by: PSYCHIATRY & NEUROLOGY

## 2017-05-01 PROCEDURE — 2580000003 HC RX 258: Performed by: PSYCHIATRY & NEUROLOGY

## 2017-05-01 PROCEDURE — S0028 INJECTION, FAMOTIDINE, 20 MG: HCPCS | Performed by: EMERGENCY MEDICINE

## 2017-05-01 PROCEDURE — 6370000000 HC RX 637 (ALT 250 FOR IP): Performed by: NEUROLOGICAL SURGERY

## 2017-05-01 PROCEDURE — 94762 N-INVAS EAR/PLS OXIMTRY CONT: CPT

## 2017-05-01 PROCEDURE — 36415 COLL VENOUS BLD VENIPUNCTURE: CPT

## 2017-05-01 PROCEDURE — 36600 WITHDRAWAL OF ARTERIAL BLOOD: CPT

## 2017-05-01 PROCEDURE — 6360000002 HC RX W HCPCS: Performed by: INTERNAL MEDICINE

## 2017-05-01 PROCEDURE — 6370000000 HC RX 637 (ALT 250 FOR IP): Performed by: PSYCHIATRY & NEUROLOGY

## 2017-05-01 PROCEDURE — 82803 BLOOD GASES ANY COMBINATION: CPT

## 2017-05-01 PROCEDURE — 85025 COMPLETE CBC W/AUTO DIFF WBC: CPT

## 2017-05-01 PROCEDURE — 6370000000 HC RX 637 (ALT 250 FOR IP): Performed by: NURSE PRACTITIONER

## 2017-05-01 PROCEDURE — 83735 ASSAY OF MAGNESIUM: CPT

## 2017-05-01 PROCEDURE — 80048 BASIC METABOLIC PNL TOTAL CA: CPT

## 2017-05-01 PROCEDURE — 71010 XR CHEST PORTABLE: CPT

## 2017-05-01 PROCEDURE — 2580000003 HC RX 258: Performed by: INTERNAL MEDICINE

## 2017-05-01 PROCEDURE — 2500000003 HC RX 250 WO HCPCS: Performed by: EMERGENCY MEDICINE

## 2017-05-01 PROCEDURE — 97110 THERAPEUTIC EXERCISES: CPT

## 2017-05-01 PROCEDURE — 2580000003 HC RX 258: Performed by: EMERGENCY MEDICINE

## 2017-05-01 PROCEDURE — 94770 HC ETCO2 MONITOR DAILY: CPT

## 2017-05-01 PROCEDURE — 2580000003 HC RX 258: Performed by: NEUROLOGICAL SURGERY

## 2017-05-01 PROCEDURE — 82947 ASSAY GLUCOSE BLOOD QUANT: CPT

## 2017-05-01 PROCEDURE — 2500000003 HC RX 250 WO HCPCS: Performed by: INTERNAL MEDICINE

## 2017-05-01 PROCEDURE — 99291 CRITICAL CARE FIRST HOUR: CPT | Performed by: INTERNAL MEDICINE

## 2017-05-01 PROCEDURE — 94003 VENT MGMT INPAT SUBQ DAY: CPT

## 2017-05-01 PROCEDURE — 6360000002 HC RX W HCPCS: Performed by: PSYCHIATRY & NEUROLOGY

## 2017-05-01 PROCEDURE — 2000000003 HC NEURO ICU R&B

## 2017-05-01 PROCEDURE — 6360000002 HC RX W HCPCS: Performed by: NEUROLOGICAL SURGERY

## 2017-05-01 RX ORDER — SODIUM CHLORIDE 9 MG/ML
INJECTION, SOLUTION INTRAVENOUS CONTINUOUS
Status: DISCONTINUED | OUTPATIENT
Start: 2017-05-02 | End: 2017-05-08 | Stop reason: HOSPADM

## 2017-05-01 RX ADMIN — OXYCODONE HYDROCHLORIDE 10 MG: 5 TABLET ORAL at 10:49

## 2017-05-01 RX ADMIN — OXYCODONE HYDROCHLORIDE 10 MG: 5 TABLET ORAL at 21:37

## 2017-05-01 RX ADMIN — LEVETIRACETAM 250 MG: 250 TABLET, FILM COATED ORAL at 20:56

## 2017-05-01 RX ADMIN — MAGNESIUM SULFATE HEPTAHYDRATE: 500 INJECTION, SOLUTION INTRAMUSCULAR; INTRAVENOUS at 03:16

## 2017-05-01 RX ADMIN — NIMODIPINE 30 MG: 60 SOLUTION ORAL at 05:05

## 2017-05-01 RX ADMIN — NIMODIPINE 30 MG: 60 SOLUTION ORAL at 03:16

## 2017-05-01 RX ADMIN — INSULIN LISPRO 1 UNITS: 100 INJECTION, SOLUTION INTRAVENOUS; SUBCUTANEOUS at 06:51

## 2017-05-01 RX ADMIN — Medication 10 ML: at 10:56

## 2017-05-01 RX ADMIN — BROMOCRIPTINE MESYLATE 5 MG: 2.5 TABLET ORAL at 20:56

## 2017-05-01 RX ADMIN — AZTREONAM 1 G: 1 INJECTION, POWDER, LYOPHILIZED, FOR SOLUTION INTRAMUSCULAR; INTRAVENOUS at 13:21

## 2017-05-01 RX ADMIN — Medication 600 MG: at 18:54

## 2017-05-01 RX ADMIN — MAGNESIUM SULFATE HEPTAHYDRATE: 500 INJECTION, SOLUTION INTRAMUSCULAR; INTRAVENOUS at 16:43

## 2017-05-01 RX ADMIN — NIMODIPINE 30 MG: 60 SOLUTION ORAL at 10:49

## 2017-05-01 RX ADMIN — Medication 10 ML: at 21:09

## 2017-05-01 RX ADMIN — FAMOTIDINE 20 MG: 10 INJECTION, SOLUTION INTRAVENOUS at 10:50

## 2017-05-01 RX ADMIN — OXYCODONE HYDROCHLORIDE 10 MG: 5 TABLET ORAL at 00:32

## 2017-05-01 RX ADMIN — NOREPINEPHRINE BITARTRATE: 1 INJECTION, SOLUTION, CONCENTRATE INTRAVENOUS at 05:48

## 2017-05-01 RX ADMIN — NIMODIPINE 30 MG: 60 SOLUTION ORAL at 22:38

## 2017-05-01 RX ADMIN — ONDANSETRON 4 MG: 2 INJECTION, SOLUTION INTRAMUSCULAR; INTRAVENOUS at 13:08

## 2017-05-01 RX ADMIN — FENTANYL CITRATE 50 MCG: 50 INJECTION INTRAMUSCULAR; INTRAVENOUS at 02:49

## 2017-05-01 RX ADMIN — OXYCODONE HYDROCHLORIDE 10 MG: 5 TABLET ORAL at 16:48

## 2017-05-01 RX ADMIN — FAMOTIDINE 20 MG: 10 INJECTION, SOLUTION INTRAVENOUS at 21:08

## 2017-05-01 RX ADMIN — SIMVASTATIN 80 MG: 40 TABLET, FILM COATED ORAL at 20:56

## 2017-05-01 RX ADMIN — NIMODIPINE 30 MG: 60 SOLUTION ORAL at 01:00

## 2017-05-01 RX ADMIN — FENTANYL CITRATE 100 MCG: 50 INJECTION INTRAMUSCULAR; INTRAVENOUS at 05:38

## 2017-05-01 RX ADMIN — POTASSIUM CHLORIDE 40 MEQ: 40 SOLUTION ORAL at 13:21

## 2017-05-01 RX ADMIN — NIMODIPINE 30 MG: 60 SOLUTION ORAL at 06:52

## 2017-05-01 RX ADMIN — ONDANSETRON 4 MG: 2 INJECTION, SOLUTION INTRAMUSCULAR; INTRAVENOUS at 07:09

## 2017-05-01 RX ADMIN — BROMOCRIPTINE MESYLATE 5 MG: 2.5 TABLET ORAL at 10:50

## 2017-05-01 RX ADMIN — VANCOMYCIN HYDROCHLORIDE 1000 MG: 1 INJECTION, SOLUTION INTRAVENOUS at 14:06

## 2017-05-01 RX ADMIN — FLUTICASONE PROPIONATE 1 SPRAY: 50 SPRAY, METERED NASAL at 11:06

## 2017-05-01 RX ADMIN — NIMODIPINE 30 MG: 60 SOLUTION ORAL at 16:05

## 2017-05-01 RX ADMIN — AZTREONAM 1 G: 1 INJECTION, POWDER, LYOPHILIZED, FOR SOLUTION INTRAMUSCULAR; INTRAVENOUS at 20:55

## 2017-05-01 RX ADMIN — NIMODIPINE 30 MG: 60 SOLUTION ORAL at 20:56

## 2017-05-01 RX ADMIN — ACETAMINOPHEN 650 MG: 325 TABLET ORAL at 13:22

## 2017-05-01 RX ADMIN — LEVETIRACETAM 250 MG: 250 TABLET, FILM COATED ORAL at 10:53

## 2017-05-01 RX ADMIN — FENTANYL CITRATE 100 MCG: 50 INJECTION INTRAMUSCULAR; INTRAVENOUS at 08:07

## 2017-05-01 RX ADMIN — ENOXAPARIN SODIUM 30 MG: 100 INJECTION SUBCUTANEOUS at 20:56

## 2017-05-01 RX ADMIN — Medication 10 ML: at 10:54

## 2017-05-01 RX ADMIN — AZTREONAM 1 G: 1 INJECTION, POWDER, LYOPHILIZED, FOR SOLUTION INTRAMUSCULAR; INTRAVENOUS at 05:04

## 2017-05-01 RX ADMIN — FENTANYL CITRATE 100 MCG: 50 INJECTION INTRAMUSCULAR; INTRAVENOUS at 09:30

## 2017-05-01 RX ADMIN — NIMODIPINE 30 MG: 60 SOLUTION ORAL at 13:21

## 2017-05-01 RX ADMIN — ENOXAPARIN SODIUM 30 MG: 100 INJECTION SUBCUTANEOUS at 10:53

## 2017-05-01 RX ADMIN — FENTANYL CITRATE 100 MCG: 50 INJECTION INTRAMUSCULAR; INTRAVENOUS at 04:30

## 2017-05-01 RX ADMIN — NIMODIPINE 30 MG: 60 SOLUTION ORAL at 18:26

## 2017-05-01 RX ADMIN — ASPIRIN 81 MG: 81 TABLET, CHEWABLE ORAL at 10:50

## 2017-05-01 ASSESSMENT — PULMONARY FUNCTION TESTS
PIF_VALUE: 20
PIF_VALUE: 29
PIF_VALUE: 21
PIF_VALUE: 19
PIF_VALUE: 23
PIF_VALUE: 29
PIF_VALUE: 25
PIF_VALUE: 30
PIF_VALUE: 22
PIF_VALUE: 32
PIF_VALUE: 28
PIF_VALUE: 20
PIF_VALUE: 19
PIF_VALUE: 32
PIF_VALUE: 16
PIF_VALUE: 29

## 2017-05-01 ASSESSMENT — PAIN SCALES - WONG BAKER: WONGBAKER_NUMERICALRESPONSE: 8

## 2017-05-02 ENCOUNTER — ANESTHESIA EVENT (OUTPATIENT)
Dept: OPERATING ROOM | Age: 57
DRG: 004 | End: 2017-05-02
Payer: MEDICARE

## 2017-05-02 ENCOUNTER — APPOINTMENT (OUTPATIENT)
Dept: CT IMAGING | Age: 57
DRG: 004 | End: 2017-05-02
Attending: NEUROLOGICAL SURGERY
Payer: MEDICARE

## 2017-05-02 ENCOUNTER — APPOINTMENT (OUTPATIENT)
Dept: GENERAL RADIOLOGY | Age: 57
DRG: 004 | End: 2017-05-02
Attending: NEUROLOGICAL SURGERY
Payer: MEDICARE

## 2017-05-02 LAB
ABSOLUTE EOS #: 0 K/UL (ref 0–0.4)
ABSOLUTE LYMPH #: 0.41 K/UL (ref 1–4.8)
ABSOLUTE MONO #: 0.17 K/UL (ref 0.1–0.8)
ANION GAP SERPL CALCULATED.3IONS-SCNC: 14 MMOL/L (ref 9–17)
BASOPHILS # BLD: 0 %
BASOPHILS ABSOLUTE: 0 K/UL (ref 0–0.2)
BUN BLDV-MCNC: 13 MG/DL (ref 6–20)
BUN/CREAT BLD: ABNORMAL (ref 9–20)
CALCIUM SERPL-MCNC: 8 MG/DL (ref 8.6–10.4)
CHLORIDE BLD-SCNC: 104 MMOL/L (ref 98–107)
CO2: 26 MMOL/L (ref 20–31)
CREAT SERPL-MCNC: 0.3 MG/DL (ref 0.5–0.9)
CULTURE: NORMAL
CULTURE: NORMAL
DIFFERENTIAL TYPE: ABNORMAL
DIRECT EXAM: NORMAL
EOSINOPHILS RELATIVE PERCENT: 0 %
FIO2: 50
GFR AFRICAN AMERICAN: >60 ML/MIN
GFR NON-AFRICAN AMERICAN: >60 ML/MIN
GFR SERPL CREATININE-BSD FRML MDRD: ABNORMAL ML/MIN/{1.73_M2}
GFR SERPL CREATININE-BSD FRML MDRD: ABNORMAL ML/MIN/{1.73_M2}
GLUCOSE BLD-MCNC: 102 MG/DL (ref 65–105)
GLUCOSE BLD-MCNC: 116 MG/DL (ref 65–105)
GLUCOSE BLD-MCNC: 122 MG/DL (ref 70–99)
GLUCOSE BLD-MCNC: 95 MG/DL (ref 65–105)
HCT VFR BLD CALC: 25.7 % (ref 36–46)
HEMOGLOBIN: 8.5 G/DL (ref 12–16)
LYMPHOCYTES # BLD: 7 %
Lab: NORMAL
MAGNESIUM: 1.5 MG/DL (ref 1.6–2.6)
MAGNESIUM: 1.7 MG/DL (ref 1.6–2.6)
MCH RBC QN AUTO: 31.3 PG (ref 26–34)
MCHC RBC AUTO-ENTMCNC: 33.1 G/DL (ref 31–37)
MCV RBC AUTO: 94.5 FL (ref 80–100)
MONOCYTES # BLD: 3 %
MORPHOLOGY: NORMAL
NEGATIVE BASE EXCESS, ART: ABNORMAL (ref 0–2)
O2 DEVICE/FLOW/%: ABNORMAL
PATIENT TEMP: 37.3
PDW BLD-RTO: 16.1 % (ref 12.5–15.4)
PLATELET # BLD: 345 K/UL (ref 140–450)
PLATELET ESTIMATE: ABNORMAL
PMV BLD AUTO: 6.9 FL (ref 6–12)
POC HCO3: 30.6 MMOL/L (ref 22–27)
POC O2 SATURATION: 92 %
POC PCO2 TEMP: ABNORMAL MM HG
POC PCO2: 45 MM HG (ref 32–45)
POC PH TEMP: ABNORMAL
POC PH: 7.44 (ref 7.35–7.45)
POC PO2 TEMP: ABNORMAL MM HG
POC PO2: 61 MM HG (ref 75–95)
POSITIVE BASE EXCESS, ART: 6 (ref 0–2)
POTASSIUM SERPL-SCNC: 3.2 MMOL/L (ref 3.7–5.3)
POTASSIUM SERPL-SCNC: 3.4 MMOL/L (ref 3.7–5.3)
RBC # BLD: 2.72 M/UL (ref 4–5.2)
RBC # BLD: ABNORMAL 10*6/UL
SEG NEUTROPHILS: 90 %
SEGMENTED NEUTROPHILS ABSOLUTE COUNT: 5.22 K/UL (ref 1.8–7.7)
SODIUM BLD-SCNC: 144 MMOL/L (ref 135–144)
SPECIMEN DESCRIPTION: NORMAL
STATUS: NORMAL
TCO2 (CALC), ART: 32 MM HG (ref 23–28)
WBC # BLD: 5.8 K/UL (ref 3.5–11)
WBC # BLD: ABNORMAL 10*3/UL

## 2017-05-02 PROCEDURE — 6370000000 HC RX 637 (ALT 250 FOR IP): Performed by: EMERGENCY MEDICINE

## 2017-05-02 PROCEDURE — 94762 N-INVAS EAR/PLS OXIMTRY CONT: CPT

## 2017-05-02 PROCEDURE — 83735 ASSAY OF MAGNESIUM: CPT

## 2017-05-02 PROCEDURE — 2500000003 HC RX 250 WO HCPCS: Performed by: INTERNAL MEDICINE

## 2017-05-02 PROCEDURE — 99233 SBSQ HOSP IP/OBS HIGH 50: CPT | Performed by: PSYCHIATRY & NEUROLOGY

## 2017-05-02 PROCEDURE — S0028 INJECTION, FAMOTIDINE, 20 MG: HCPCS | Performed by: EMERGENCY MEDICINE

## 2017-05-02 PROCEDURE — 82803 BLOOD GASES ANY COMBINATION: CPT

## 2017-05-02 PROCEDURE — 36415 COLL VENOUS BLD VENIPUNCTURE: CPT

## 2017-05-02 PROCEDURE — 6360000004 HC RX CONTRAST MEDICATION: Performed by: PSYCHIATRY & NEUROLOGY

## 2017-05-02 PROCEDURE — 71010 XR CHEST PORTABLE: CPT

## 2017-05-02 PROCEDURE — 2580000003 HC RX 258: Performed by: NEUROLOGICAL SURGERY

## 2017-05-02 PROCEDURE — 6370000000 HC RX 637 (ALT 250 FOR IP): Performed by: NURSE PRACTITIONER

## 2017-05-02 PROCEDURE — 70496 CT ANGIOGRAPHY HEAD: CPT

## 2017-05-02 PROCEDURE — 70450 CT HEAD/BRAIN W/O DYE: CPT

## 2017-05-02 PROCEDURE — 2580000003 HC RX 258: Performed by: PSYCHIATRY & NEUROLOGY

## 2017-05-02 PROCEDURE — 2580000003 HC RX 258: Performed by: EMERGENCY MEDICINE

## 2017-05-02 PROCEDURE — 6370000000 HC RX 637 (ALT 250 FOR IP): Performed by: PSYCHIATRY & NEUROLOGY

## 2017-05-02 PROCEDURE — 6360000002 HC RX W HCPCS: Performed by: INTERNAL MEDICINE

## 2017-05-02 PROCEDURE — 82947 ASSAY GLUCOSE BLOOD QUANT: CPT

## 2017-05-02 PROCEDURE — 2500000003 HC RX 250 WO HCPCS: Performed by: EMERGENCY MEDICINE

## 2017-05-02 PROCEDURE — 94003 VENT MGMT INPAT SUBQ DAY: CPT

## 2017-05-02 PROCEDURE — 6360000002 HC RX W HCPCS: Performed by: NEUROLOGICAL SURGERY

## 2017-05-02 PROCEDURE — 94770 HC ETCO2 MONITOR DAILY: CPT

## 2017-05-02 PROCEDURE — 80048 BASIC METABOLIC PNL TOTAL CA: CPT

## 2017-05-02 PROCEDURE — 2580000003 HC RX 258: Performed by: INTERNAL MEDICINE

## 2017-05-02 PROCEDURE — 6360000002 HC RX W HCPCS: Performed by: EMERGENCY MEDICINE

## 2017-05-02 PROCEDURE — 6360000002 HC RX W HCPCS: Performed by: NURSE PRACTITIONER

## 2017-05-02 PROCEDURE — 2000000003 HC NEURO ICU R&B

## 2017-05-02 PROCEDURE — 84132 ASSAY OF SERUM POTASSIUM: CPT

## 2017-05-02 PROCEDURE — 36600 WITHDRAWAL OF ARTERIAL BLOOD: CPT

## 2017-05-02 PROCEDURE — 99291 CRITICAL CARE FIRST HOUR: CPT | Performed by: INTERNAL MEDICINE

## 2017-05-02 PROCEDURE — 85025 COMPLETE CBC W/AUTO DIFF WBC: CPT

## 2017-05-02 RX ADMIN — OXYCODONE HYDROCHLORIDE 10 MG: 5 TABLET ORAL at 06:08

## 2017-05-02 RX ADMIN — BROMOCRIPTINE MESYLATE 5 MG: 2.5 TABLET ORAL at 20:54

## 2017-05-02 RX ADMIN — Medication 10 ML: at 20:54

## 2017-05-02 RX ADMIN — BROMOCRIPTINE MESYLATE 5 MG: 2.5 TABLET ORAL at 08:34

## 2017-05-02 RX ADMIN — NIMODIPINE 30 MG: 60 SOLUTION ORAL at 16:02

## 2017-05-02 RX ADMIN — NIMODIPINE 30 MG: 60 SOLUTION ORAL at 00:30

## 2017-05-02 RX ADMIN — SODIUM CHLORIDE: 9 INJECTION, SOLUTION INTRAVENOUS at 06:07

## 2017-05-02 RX ADMIN — AZTREONAM 1 G: 1 INJECTION, POWDER, LYOPHILIZED, FOR SOLUTION INTRAMUSCULAR; INTRAVENOUS at 04:44

## 2017-05-02 RX ADMIN — FAMOTIDINE 20 MG: 10 INJECTION, SOLUTION INTRAVENOUS at 08:33

## 2017-05-02 RX ADMIN — LEVETIRACETAM 250 MG: 250 TABLET, FILM COATED ORAL at 08:34

## 2017-05-02 RX ADMIN — OXYCODONE HYDROCHLORIDE 10 MG: 5 TABLET ORAL at 12:15

## 2017-05-02 RX ADMIN — Medication 600 MG: at 18:55

## 2017-05-02 RX ADMIN — ENOXAPARIN SODIUM 30 MG: 100 INJECTION SUBCUTANEOUS at 20:54

## 2017-05-02 RX ADMIN — ONDANSETRON 4 MG: 2 INJECTION, SOLUTION INTRAMUSCULAR; INTRAVENOUS at 09:02

## 2017-05-02 RX ADMIN — FENTANYL CITRATE 100 MCG: 50 INJECTION INTRAMUSCULAR; INTRAVENOUS at 18:35

## 2017-05-02 RX ADMIN — ENOXAPARIN SODIUM 30 MG: 100 INJECTION SUBCUTANEOUS at 08:34

## 2017-05-02 RX ADMIN — NIMODIPINE 30 MG: 60 SOLUTION ORAL at 14:03

## 2017-05-02 RX ADMIN — NIMODIPINE 30 MG: 60 SOLUTION ORAL at 02:09

## 2017-05-02 RX ADMIN — Medication 10 ML: at 08:35

## 2017-05-02 RX ADMIN — POTASSIUM CHLORIDE 20 MEQ: 29.8 INJECTION, SOLUTION INTRAVENOUS at 09:25

## 2017-05-02 RX ADMIN — NIMODIPINE 30 MG: 60 SOLUTION ORAL at 12:15

## 2017-05-02 RX ADMIN — FENTANYL CITRATE 100 MCG: 50 INJECTION INTRAMUSCULAR; INTRAVENOUS at 15:42

## 2017-05-02 RX ADMIN — NIMODIPINE 30 MG: 60 SOLUTION ORAL at 10:12

## 2017-05-02 RX ADMIN — Medication 600 MG: at 13:12

## 2017-05-02 RX ADMIN — NIMODIPINE 30 MG: 60 SOLUTION ORAL at 20:16

## 2017-05-02 RX ADMIN — IOVERSOL 90 ML: 741 INJECTION INTRA-ARTERIAL; INTRAVENOUS at 04:32

## 2017-05-02 RX ADMIN — Medication 600 MG: at 02:09

## 2017-05-02 RX ADMIN — NIMODIPINE 30 MG: 60 SOLUTION ORAL at 08:33

## 2017-05-02 RX ADMIN — FLUTICASONE PROPIONATE 1 SPRAY: 50 SPRAY, METERED NASAL at 13:15

## 2017-05-02 RX ADMIN — ASPIRIN 81 MG: 81 TABLET, CHEWABLE ORAL at 08:33

## 2017-05-02 RX ADMIN — SIMVASTATIN 80 MG: 40 TABLET, FILM COATED ORAL at 20:53

## 2017-05-02 RX ADMIN — LEVETIRACETAM 250 MG: 250 TABLET, FILM COATED ORAL at 20:54

## 2017-05-02 RX ADMIN — OXYCODONE HYDROCHLORIDE 10 MG: 5 TABLET ORAL at 20:54

## 2017-05-02 RX ADMIN — FENTANYL CITRATE 100 MCG: 50 INJECTION INTRAMUSCULAR; INTRAVENOUS at 12:34

## 2017-05-02 RX ADMIN — NIMODIPINE 30 MG: 60 SOLUTION ORAL at 17:31

## 2017-05-02 RX ADMIN — FAMOTIDINE 20 MG: 10 INJECTION, SOLUTION INTRAVENOUS at 20:53

## 2017-05-02 RX ADMIN — NIMODIPINE 30 MG: 60 SOLUTION ORAL at 21:57

## 2017-05-02 RX ADMIN — NIMODIPINE 30 MG: 60 SOLUTION ORAL at 04:45

## 2017-05-02 RX ADMIN — AZTREONAM 1 G: 1 INJECTION, POWDER, LYOPHILIZED, FOR SOLUTION INTRAMUSCULAR; INTRAVENOUS at 13:12

## 2017-05-02 RX ADMIN — OXYCODONE HYDROCHLORIDE 10 MG: 5 TABLET ORAL at 16:46

## 2017-05-02 RX ADMIN — POTASSIUM CHLORIDE 20 MEQ: 29.8 INJECTION, SOLUTION INTRAVENOUS at 10:30

## 2017-05-02 RX ADMIN — NIMODIPINE 30 MG: 60 SOLUTION ORAL at 06:07

## 2017-05-02 ASSESSMENT — PAIN SCALES - WONG BAKER: WONGBAKER_NUMERICALRESPONSE: 8

## 2017-05-02 ASSESSMENT — PAIN SCALES - GENERAL
PAINLEVEL_OUTOF10: 8
PAINLEVEL_OUTOF10: 10

## 2017-05-02 ASSESSMENT — PULMONARY FUNCTION TESTS
PIF_VALUE: 22
PIF_VALUE: 16
PIF_VALUE: 22
PIF_VALUE: 9
PIF_VALUE: 16
PIF_VALUE: 18
PIF_VALUE: 16
PIF_VALUE: 22
PIF_VALUE: 20

## 2017-05-03 ENCOUNTER — APPOINTMENT (OUTPATIENT)
Dept: CT IMAGING | Age: 57
DRG: 004 | End: 2017-05-03
Attending: NEUROLOGICAL SURGERY
Payer: MEDICARE

## 2017-05-03 ENCOUNTER — APPOINTMENT (OUTPATIENT)
Dept: GENERAL RADIOLOGY | Age: 57
DRG: 004 | End: 2017-05-03
Attending: NEUROLOGICAL SURGERY
Payer: MEDICARE

## 2017-05-03 ENCOUNTER — ANESTHESIA (OUTPATIENT)
Dept: OPERATING ROOM | Age: 57
DRG: 004 | End: 2017-05-03
Payer: MEDICARE

## 2017-05-03 VITALS — OXYGEN SATURATION: 96 % | DIASTOLIC BLOOD PRESSURE: 42 MMHG | TEMPERATURE: 97.7 F | SYSTOLIC BLOOD PRESSURE: 57 MMHG

## 2017-05-03 LAB
ABSOLUTE EOS #: 0 K/UL (ref 0–0.4)
ABSOLUTE LYMPH #: 0.8 K/UL (ref 1–4.8)
ABSOLUTE MONO #: 0.3 K/UL (ref 0.1–1.2)
ANION GAP SERPL CALCULATED.3IONS-SCNC: 13 MMOL/L (ref 9–17)
BASOPHILS # BLD: 0 %
BASOPHILS ABSOLUTE: 0 K/UL (ref 0–0.2)
BUN BLDV-MCNC: 11 MG/DL (ref 6–20)
BUN/CREAT BLD: ABNORMAL (ref 9–20)
CALCIUM SERPL-MCNC: 7.8 MG/DL (ref 8.6–10.4)
CHLORIDE BLD-SCNC: 104 MMOL/L (ref 98–107)
CO2: 26 MMOL/L (ref 20–31)
CREAT SERPL-MCNC: 0.21 MG/DL (ref 0.5–0.9)
DIFFERENTIAL TYPE: ABNORMAL
EOSINOPHILS RELATIVE PERCENT: 0 %
FIO2: 30
GFR AFRICAN AMERICAN: >60 ML/MIN
GFR NON-AFRICAN AMERICAN: >60 ML/MIN
GFR SERPL CREATININE-BSD FRML MDRD: ABNORMAL ML/MIN/{1.73_M2}
GFR SERPL CREATININE-BSD FRML MDRD: ABNORMAL ML/MIN/{1.73_M2}
GLUCOSE BLD-MCNC: 104 MG/DL (ref 65–105)
GLUCOSE BLD-MCNC: 87 MG/DL (ref 65–105)
GLUCOSE BLD-MCNC: 90 MG/DL (ref 65–105)
GLUCOSE BLD-MCNC: 98 MG/DL (ref 65–105)
GLUCOSE BLD-MCNC: 99 MG/DL (ref 70–99)
HCT VFR BLD CALC: 24.3 % (ref 36–46)
HEMOGLOBIN: 8 G/DL (ref 12–16)
LYMPHOCYTES # BLD: 19 %
MAGNESIUM: 1.4 MG/DL (ref 1.6–2.6)
MAGNESIUM: 1.4 MG/DL (ref 1.6–2.6)
MCH RBC QN AUTO: 31.3 PG (ref 26–34)
MCHC RBC AUTO-ENTMCNC: 33.1 G/DL (ref 31–37)
MCV RBC AUTO: 94.5 FL (ref 80–100)
MONOCYTES # BLD: 7 %
NEGATIVE BASE EXCESS, ART: ABNORMAL (ref 0–2)
O2 DEVICE/FLOW/%: ABNORMAL
PATIENT TEMP: ABNORMAL
PDW BLD-RTO: 15.8 % (ref 12.5–15.4)
PLATELET # BLD: 329 K/UL (ref 140–450)
PLATELET ESTIMATE: ABNORMAL
PMV BLD AUTO: 6.4 FL (ref 6–12)
POC HCO3: 28.2 MMOL/L (ref 22–27)
POC O2 SATURATION: 94 %
POC PCO2 TEMP: ABNORMAL MM HG
POC PCO2: 41 MM HG (ref 32–45)
POC PH TEMP: ABNORMAL
POC PH: 7.45 (ref 7.35–7.45)
POC PO2 TEMP: ABNORMAL MM HG
POC PO2: 68 MM HG (ref 75–95)
POSITIVE BASE EXCESS, ART: 4 (ref 0–2)
POTASSIUM SERPL-SCNC: 3 MMOL/L (ref 3.7–5.3)
RBC # BLD: 2.57 M/UL (ref 4–5.2)
RBC # BLD: ABNORMAL 10*6/UL
SEG NEUTROPHILS: 74 %
SEGMENTED NEUTROPHILS ABSOLUTE COUNT: 3.3 K/UL (ref 1.8–7.7)
SODIUM BLD-SCNC: 143 MMOL/L (ref 135–144)
TCO2 (CALC), ART: 29 MM HG (ref 23–28)
WBC # BLD: 4.4 K/UL (ref 3.5–11)
WBC # BLD: ABNORMAL 10*3/UL

## 2017-05-03 PROCEDURE — 83735 ASSAY OF MAGNESIUM: CPT

## 2017-05-03 PROCEDURE — 70450 CT HEAD/BRAIN W/O DYE: CPT

## 2017-05-03 PROCEDURE — 82947 ASSAY GLUCOSE BLOOD QUANT: CPT

## 2017-05-03 PROCEDURE — 3600000004 HC SURGERY LEVEL 4 BASE: Performed by: SURGERY

## 2017-05-03 PROCEDURE — 36415 COLL VENOUS BLD VENIPUNCTURE: CPT

## 2017-05-03 PROCEDURE — 94664 DEMO&/EVAL PT USE INHALER: CPT

## 2017-05-03 PROCEDURE — 2580000003 HC RX 258: Performed by: EMERGENCY MEDICINE

## 2017-05-03 PROCEDURE — 94762 N-INVAS EAR/PLS OXIMTRY CONT: CPT

## 2017-05-03 PROCEDURE — 94770 HC ETCO2 MONITOR DAILY: CPT

## 2017-05-03 PROCEDURE — 2500000003 HC RX 250 WO HCPCS: Performed by: INTERNAL MEDICINE

## 2017-05-03 PROCEDURE — 82803 BLOOD GASES ANY COMBINATION: CPT

## 2017-05-03 PROCEDURE — 2580000003 HC RX 258: Performed by: PSYCHIATRY & NEUROLOGY

## 2017-05-03 PROCEDURE — 94640 AIRWAY INHALATION TREATMENT: CPT

## 2017-05-03 PROCEDURE — 2500000003 HC RX 250 WO HCPCS: Performed by: EMERGENCY MEDICINE

## 2017-05-03 PROCEDURE — 99233 SBSQ HOSP IP/OBS HIGH 50: CPT | Performed by: PSYCHIATRY & NEUROLOGY

## 2017-05-03 PROCEDURE — 6370000000 HC RX 637 (ALT 250 FOR IP): Performed by: SURGERY

## 2017-05-03 PROCEDURE — 2580000003 HC RX 258: Performed by: NEUROLOGICAL SURGERY

## 2017-05-03 PROCEDURE — 0DH63UZ INSERTION OF FEEDING DEVICE INTO STOMACH, PERCUTANEOUS APPROACH: ICD-10-PCS | Performed by: SURGERY

## 2017-05-03 PROCEDURE — 3700000001 HC ADD 15 MINUTES (ANESTHESIA): Performed by: SURGERY

## 2017-05-03 PROCEDURE — S0028 INJECTION, FAMOTIDINE, 20 MG: HCPCS | Performed by: EMERGENCY MEDICINE

## 2017-05-03 PROCEDURE — 2580000003 HC RX 258: Performed by: SURGERY

## 2017-05-03 PROCEDURE — 6370000000 HC RX 637 (ALT 250 FOR IP): Performed by: INTERNAL MEDICINE

## 2017-05-03 PROCEDURE — 6370000000 HC RX 637 (ALT 250 FOR IP): Performed by: PSYCHIATRY & NEUROLOGY

## 2017-05-03 PROCEDURE — 85025 COMPLETE CBC W/AUTO DIFF WBC: CPT

## 2017-05-03 PROCEDURE — 6370000000 HC RX 637 (ALT 250 FOR IP): Performed by: EMERGENCY MEDICINE

## 2017-05-03 PROCEDURE — 6360000002 HC RX W HCPCS: Performed by: NURSE ANESTHETIST, CERTIFIED REGISTERED

## 2017-05-03 PROCEDURE — 36600 WITHDRAWAL OF ARTERIAL BLOOD: CPT

## 2017-05-03 PROCEDURE — 0B110F4 BYPASS TRACHEA TO CUTANEOUS WITH TRACHEOSTOMY DEVICE, OPEN APPROACH: ICD-10-PCS | Performed by: SURGERY

## 2017-05-03 PROCEDURE — 6370000000 HC RX 637 (ALT 250 FOR IP): Performed by: NURSE PRACTITIONER

## 2017-05-03 PROCEDURE — 94003 VENT MGMT INPAT SUBQ DAY: CPT

## 2017-05-03 PROCEDURE — 71010 XR CHEST PORTABLE: CPT

## 2017-05-03 PROCEDURE — 3600000014 HC SURGERY LEVEL 4 ADDTL 15MIN: Performed by: SURGERY

## 2017-05-03 PROCEDURE — 3700000000 HC ANESTHESIA ATTENDED CARE: Performed by: SURGERY

## 2017-05-03 PROCEDURE — 6360000002 HC RX W HCPCS: Performed by: INTERNAL MEDICINE

## 2017-05-03 PROCEDURE — 2580000003 HC RX 258: Performed by: NURSE ANESTHETIST, CERTIFIED REGISTERED

## 2017-05-03 PROCEDURE — 2580000003 HC RX 258: Performed by: INTERNAL MEDICINE

## 2017-05-03 PROCEDURE — 99291 CRITICAL CARE FIRST HOUR: CPT | Performed by: INTERNAL MEDICINE

## 2017-05-03 PROCEDURE — 2000000003 HC NEURO ICU R&B

## 2017-05-03 PROCEDURE — 2500000003 HC RX 250 WO HCPCS: Performed by: NURSE ANESTHETIST, CERTIFIED REGISTERED

## 2017-05-03 PROCEDURE — 80048 BASIC METABOLIC PNL TOTAL CA: CPT

## 2017-05-03 RX ORDER — MAGNESIUM HYDROXIDE 1200 MG/15ML
LIQUID ORAL CONTINUOUS PRN
Status: DISCONTINUED | OUTPATIENT
Start: 2017-05-03 | End: 2017-05-03 | Stop reason: HOSPADM

## 2017-05-03 RX ORDER — ALBUTEROL SULFATE 90 UG/1
6 AEROSOL, METERED RESPIRATORY (INHALATION) EVERY 6 HOURS
Status: DISCONTINUED | OUTPATIENT
Start: 2017-05-03 | End: 2017-05-04

## 2017-05-03 RX ORDER — FENTANYL CITRATE 50 UG/ML
INJECTION, SOLUTION INTRAMUSCULAR; INTRAVENOUS PRN
Status: DISCONTINUED | OUTPATIENT
Start: 2017-05-03 | End: 2017-05-03 | Stop reason: SDUPTHER

## 2017-05-03 RX ORDER — ONDANSETRON 2 MG/ML
INJECTION INTRAMUSCULAR; INTRAVENOUS PRN
Status: DISCONTINUED | OUTPATIENT
Start: 2017-05-03 | End: 2017-05-03 | Stop reason: SDUPTHER

## 2017-05-03 RX ORDER — ROCURONIUM BROMIDE 10 MG/ML
INJECTION, SOLUTION INTRAVENOUS PRN
Status: DISCONTINUED | OUTPATIENT
Start: 2017-05-03 | End: 2017-05-03 | Stop reason: SDUPTHER

## 2017-05-03 RX ORDER — DEXAMETHASONE SODIUM PHOSPHATE 10 MG/ML
INJECTION INTRAMUSCULAR; INTRAVENOUS PRN
Status: DISCONTINUED | OUTPATIENT
Start: 2017-05-03 | End: 2017-05-03 | Stop reason: SDUPTHER

## 2017-05-03 RX ORDER — ULTRASOUND COUPLING MEDIUM
GEL (GRAM) TOPICAL PRN
Status: DISCONTINUED | OUTPATIENT
Start: 2017-05-03 | End: 2017-05-03 | Stop reason: HOSPADM

## 2017-05-03 RX ORDER — SODIUM CHLORIDE 9 MG/ML
INJECTION, SOLUTION INTRAVENOUS CONTINUOUS PRN
Status: DISCONTINUED | OUTPATIENT
Start: 2017-05-03 | End: 2017-05-03 | Stop reason: SDUPTHER

## 2017-05-03 RX ADMIN — NIMODIPINE 30 MG: 60 SOLUTION ORAL at 08:23

## 2017-05-03 RX ADMIN — FLUTICASONE PROPIONATE 1 SPRAY: 50 SPRAY, METERED NASAL at 08:23

## 2017-05-03 RX ADMIN — NIMODIPINE 30 MG: 60 SOLUTION ORAL at 06:13

## 2017-05-03 RX ADMIN — DEXAMETHASONE SODIUM PHOSPHATE 10 MG: 10 INJECTION INTRAMUSCULAR; INTRAVENOUS at 10:23

## 2017-05-03 RX ADMIN — SODIUM CHLORIDE: 9 INJECTION, SOLUTION INTRAVENOUS at 04:23

## 2017-05-03 RX ADMIN — FENTANYL CITRATE 100 MCG: 50 INJECTION INTRAMUSCULAR; INTRAVENOUS at 13:59

## 2017-05-03 RX ADMIN — NIMODIPINE 30 MG: 60 SOLUTION ORAL at 17:59

## 2017-05-03 RX ADMIN — NIMODIPINE 30 MG: 60 SOLUTION ORAL at 00:24

## 2017-05-03 RX ADMIN — BROMOCRIPTINE MESYLATE 5 MG: 2.5 TABLET ORAL at 08:24

## 2017-05-03 RX ADMIN — ALBUTEROL SULFATE 6 PUFF: 90 AEROSOL, METERED RESPIRATORY (INHALATION) at 19:57

## 2017-05-03 RX ADMIN — LEVETIRACETAM 250 MG: 250 TABLET, FILM COATED ORAL at 08:23

## 2017-05-03 RX ADMIN — BROMOCRIPTINE MESYLATE 5 MG: 2.5 TABLET ORAL at 21:20

## 2017-05-03 RX ADMIN — ALBUTEROL SULFATE 6 PUFF: 90 AEROSOL, METERED RESPIRATORY (INHALATION) at 14:26

## 2017-05-03 RX ADMIN — NIMODIPINE 30 MG: 60 SOLUTION ORAL at 22:12

## 2017-05-03 RX ADMIN — NIMODIPINE 30 MG: 60 SOLUTION ORAL at 04:24

## 2017-05-03 RX ADMIN — NIMODIPINE 30 MG: 60 SOLUTION ORAL at 02:15

## 2017-05-03 RX ADMIN — FENTANYL CITRATE 100 MCG: 50 INJECTION INTRAMUSCULAR; INTRAVENOUS at 17:38

## 2017-05-03 RX ADMIN — Medication 10 ML: at 21:20

## 2017-05-03 RX ADMIN — ROCURONIUM BROMIDE 50 MG: 10 INJECTION INTRAVENOUS at 10:23

## 2017-05-03 RX ADMIN — SIMVASTATIN 80 MG: 40 TABLET, FILM COATED ORAL at 21:20

## 2017-05-03 RX ADMIN — NIMODIPINE 30 MG: 60 SOLUTION ORAL at 19:56

## 2017-05-03 RX ADMIN — OXYCODONE HYDROCHLORIDE 10 MG: 5 TABLET ORAL at 04:24

## 2017-05-03 RX ADMIN — Medication 10 ML: at 21:28

## 2017-05-03 RX ADMIN — Medication 600 MG: at 13:01

## 2017-05-03 RX ADMIN — ONDANSETRON 4 MG: 2 INJECTION, SOLUTION INTRAMUSCULAR; INTRAVENOUS at 10:23

## 2017-05-03 RX ADMIN — NIMODIPINE 30 MG: 60 SOLUTION ORAL at 09:50

## 2017-05-03 RX ADMIN — SODIUM CHLORIDE: 9 INJECTION, SOLUTION INTRAVENOUS at 10:00

## 2017-05-03 RX ADMIN — FENTANYL CITRATE 100 MCG: 50 INJECTION INTRAMUSCULAR; INTRAVENOUS at 10:23

## 2017-05-03 RX ADMIN — Medication 600 MG: at 03:13

## 2017-05-03 RX ADMIN — FAMOTIDINE 20 MG: 10 INJECTION, SOLUTION INTRAVENOUS at 08:23

## 2017-05-03 RX ADMIN — Medication 10 ML: at 08:23

## 2017-05-03 RX ADMIN — FAMOTIDINE 20 MG: 10 INJECTION, SOLUTION INTRAVENOUS at 21:20

## 2017-05-03 RX ADMIN — Medication 600 MG: at 19:55

## 2017-05-03 RX ADMIN — ROCURONIUM BROMIDE 20 MG: 10 INJECTION INTRAVENOUS at 11:10

## 2017-05-03 RX ADMIN — OXYCODONE HYDROCHLORIDE 10 MG: 5 TABLET ORAL at 21:20

## 2017-05-03 RX ADMIN — FENTANYL CITRATE 100 MCG: 50 INJECTION INTRAMUSCULAR; INTRAVENOUS at 02:15

## 2017-05-03 RX ADMIN — NIMODIPINE 30 MG: 60 SOLUTION ORAL at 16:17

## 2017-05-03 ASSESSMENT — PAIN SCALES - GENERAL
PAINLEVEL_OUTOF10: 8
PAINLEVEL_OUTOF10: 8

## 2017-05-03 ASSESSMENT — PULMONARY FUNCTION TESTS
PIF_VALUE: 15
PIF_VALUE: 21
PIF_VALUE: 11
PIF_VALUE: 16
PIF_VALUE: 10
PIF_VALUE: 21
PIF_VALUE: 7
PIF_VALUE: 16
PIF_VALUE: 5
PIF_VALUE: 19

## 2017-05-03 ASSESSMENT — PAIN SCALES - WONG BAKER: WONGBAKER_NUMERICALRESPONSE: 8

## 2017-05-04 ENCOUNTER — APPOINTMENT (OUTPATIENT)
Dept: CT IMAGING | Age: 57
DRG: 004 | End: 2017-05-04
Attending: NEUROLOGICAL SURGERY
Payer: MEDICARE

## 2017-05-04 ENCOUNTER — APPOINTMENT (OUTPATIENT)
Dept: GENERAL RADIOLOGY | Age: 57
DRG: 004 | End: 2017-05-04
Attending: NEUROLOGICAL SURGERY
Payer: MEDICARE

## 2017-05-04 LAB
ABSOLUTE EOS #: 0 K/UL (ref 0–0.4)
ABSOLUTE LYMPH #: 0.7 K/UL (ref 1–4.8)
ABSOLUTE MONO #: 0.6 K/UL (ref 0.1–1.2)
ANION GAP SERPL CALCULATED.3IONS-SCNC: 17 MMOL/L (ref 9–17)
BASOPHILS # BLD: 0 %
BASOPHILS ABSOLUTE: 0 K/UL (ref 0–0.2)
BUN BLDV-MCNC: 5 MG/DL (ref 6–20)
BUN/CREAT BLD: ABNORMAL (ref 9–20)
CALCIUM SERPL-MCNC: 8.1 MG/DL (ref 8.6–10.4)
CHLORIDE BLD-SCNC: 101 MMOL/L (ref 98–107)
CO2: 24 MMOL/L (ref 20–31)
CREAT SERPL-MCNC: 0.26 MG/DL (ref 0.5–0.9)
DIFFERENTIAL TYPE: ABNORMAL
EOSINOPHILS RELATIVE PERCENT: 0 %
FIO2: 40
GFR AFRICAN AMERICAN: >60 ML/MIN
GFR NON-AFRICAN AMERICAN: >60 ML/MIN
GFR SERPL CREATININE-BSD FRML MDRD: ABNORMAL ML/MIN/{1.73_M2}
GFR SERPL CREATININE-BSD FRML MDRD: ABNORMAL ML/MIN/{1.73_M2}
GLUCOSE BLD-MCNC: 89 MG/DL (ref 65–105)
GLUCOSE BLD-MCNC: 92 MG/DL (ref 65–105)
GLUCOSE BLD-MCNC: 95 MG/DL (ref 65–105)
GLUCOSE BLD-MCNC: 96 MG/DL (ref 65–105)
GLUCOSE BLD-MCNC: 96 MG/DL (ref 70–99)
HCT VFR BLD CALC: 28 % (ref 36–46)
HEMOGLOBIN: 9.3 G/DL (ref 12–16)
LYMPHOCYTES # BLD: 9 %
MAGNESIUM: 1.3 MG/DL (ref 1.6–2.6)
MAGNESIUM: 1.4 MG/DL (ref 1.6–2.6)
MCH RBC QN AUTO: 31.1 PG (ref 26–34)
MCHC RBC AUTO-ENTMCNC: 33.3 G/DL (ref 31–37)
MCV RBC AUTO: 93.2 FL (ref 80–100)
MONOCYTES # BLD: 8 %
NEGATIVE BASE EXCESS, ART: ABNORMAL (ref 0–2)
O2 DEVICE/FLOW/%: ABNORMAL
PATIENT TEMP: ABNORMAL
PDW BLD-RTO: 15.6 % (ref 12.5–15.4)
PLATELET # BLD: 445 K/UL (ref 140–450)
PLATELET ESTIMATE: ABNORMAL
PMV BLD AUTO: 6.3 FL (ref 6–12)
POC HCO3: 29.8 MMOL/L (ref 22–27)
POC O2 SATURATION: 98 %
POC PCO2 TEMP: ABNORMAL MM HG
POC PCO2: 36 MM HG (ref 32–45)
POC PH TEMP: ABNORMAL
POC PH: 7.53 (ref 7.35–7.45)
POC PO2 TEMP: ABNORMAL MM HG
POC PO2: 92 MM HG (ref 75–95)
POSITIVE BASE EXCESS, ART: 7 (ref 0–2)
POTASSIUM SERPL-SCNC: 2.8 MMOL/L (ref 3.7–5.3)
RBC # BLD: 3 M/UL (ref 4–5.2)
RBC # BLD: ABNORMAL 10*6/UL
SEG NEUTROPHILS: 83 %
SEGMENTED NEUTROPHILS ABSOLUTE COUNT: 6.9 K/UL (ref 1.8–7.7)
SODIUM BLD-SCNC: 142 MMOL/L (ref 135–144)
TCO2 (CALC), ART: 31 MM HG (ref 23–28)
WBC # BLD: 8.3 K/UL (ref 3.5–11)
WBC # BLD: ABNORMAL 10*3/UL

## 2017-05-04 PROCEDURE — 6370000000 HC RX 637 (ALT 250 FOR IP): Performed by: EMERGENCY MEDICINE

## 2017-05-04 PROCEDURE — 36415 COLL VENOUS BLD VENIPUNCTURE: CPT

## 2017-05-04 PROCEDURE — 99231 SBSQ HOSP IP/OBS SF/LOW 25: CPT | Performed by: NEUROLOGICAL SURGERY

## 2017-05-04 PROCEDURE — 99233 SBSQ HOSP IP/OBS HIGH 50: CPT | Performed by: PSYCHIATRY & NEUROLOGY

## 2017-05-04 PROCEDURE — 2000000003 HC NEURO ICU R&B

## 2017-05-04 PROCEDURE — 6360000002 HC RX W HCPCS: Performed by: INTERNAL MEDICINE

## 2017-05-04 PROCEDURE — 6360000002 HC RX W HCPCS: Performed by: EMERGENCY MEDICINE

## 2017-05-04 PROCEDURE — 71020 XR CHEST STANDARD TWO VW: CPT

## 2017-05-04 PROCEDURE — 6370000000 HC RX 637 (ALT 250 FOR IP): Performed by: NEUROLOGICAL SURGERY

## 2017-05-04 PROCEDURE — 6360000002 HC RX W HCPCS: Performed by: SURGERY

## 2017-05-04 PROCEDURE — S0028 INJECTION, FAMOTIDINE, 20 MG: HCPCS | Performed by: EMERGENCY MEDICINE

## 2017-05-04 PROCEDURE — 6370000000 HC RX 637 (ALT 250 FOR IP): Performed by: PSYCHIATRY & NEUROLOGY

## 2017-05-04 PROCEDURE — 2580000003 HC RX 258: Performed by: INTERNAL MEDICINE

## 2017-05-04 PROCEDURE — 94762 N-INVAS EAR/PLS OXIMTRY CONT: CPT

## 2017-05-04 PROCEDURE — 2500000003 HC RX 250 WO HCPCS: Performed by: INTERNAL MEDICINE

## 2017-05-04 PROCEDURE — 94770 HC ETCO2 MONITOR DAILY: CPT

## 2017-05-04 PROCEDURE — 36600 WITHDRAWAL OF ARTERIAL BLOOD: CPT

## 2017-05-04 PROCEDURE — 2580000003 HC RX 258: Performed by: PSYCHIATRY & NEUROLOGY

## 2017-05-04 PROCEDURE — 2580000003 HC RX 258: Performed by: EMERGENCY MEDICINE

## 2017-05-04 PROCEDURE — 2500000003 HC RX 250 WO HCPCS: Performed by: EMERGENCY MEDICINE

## 2017-05-04 PROCEDURE — 70450 CT HEAD/BRAIN W/O DYE: CPT

## 2017-05-04 PROCEDURE — 94003 VENT MGMT INPAT SUBQ DAY: CPT

## 2017-05-04 PROCEDURE — 85025 COMPLETE CBC W/AUTO DIFF WBC: CPT

## 2017-05-04 PROCEDURE — 80048 BASIC METABOLIC PNL TOTAL CA: CPT

## 2017-05-04 PROCEDURE — 76937 US GUIDE VASCULAR ACCESS: CPT

## 2017-05-04 PROCEDURE — 2580000003 HC RX 258: Performed by: NEUROLOGICAL SURGERY

## 2017-05-04 PROCEDURE — 94640 AIRWAY INHALATION TREATMENT: CPT

## 2017-05-04 PROCEDURE — 82803 BLOOD GASES ANY COMBINATION: CPT

## 2017-05-04 PROCEDURE — 82947 ASSAY GLUCOSE BLOOD QUANT: CPT

## 2017-05-04 PROCEDURE — 83735 ASSAY OF MAGNESIUM: CPT

## 2017-05-04 PROCEDURE — 99291 CRITICAL CARE FIRST HOUR: CPT | Performed by: INTERNAL MEDICINE

## 2017-05-04 RX ORDER — CLINDAMYCIN HYDROCHLORIDE 150 MG/1
300 CAPSULE ORAL EVERY 6 HOURS SCHEDULED
Status: DISCONTINUED | OUTPATIENT
Start: 2017-05-05 | End: 2017-05-08 | Stop reason: HOSPADM

## 2017-05-04 RX ORDER — ALBUTEROL SULFATE 90 UG/1
6 AEROSOL, METERED RESPIRATORY (INHALATION) EVERY 6 HOURS PRN
Status: DISCONTINUED | OUTPATIENT
Start: 2017-05-04 | End: 2017-05-08 | Stop reason: HOSPADM

## 2017-05-04 RX ADMIN — NIMODIPINE 60 MG: 60 SOLUTION ORAL at 04:56

## 2017-05-04 RX ADMIN — DOCUSATE SODIUM 100 MG: 50 LIQUID ORAL at 21:07

## 2017-05-04 RX ADMIN — Medication 10 ML: at 20:52

## 2017-05-04 RX ADMIN — Medication 10 ML: at 08:55

## 2017-05-04 RX ADMIN — FENTANYL CITRATE 100 MCG: 50 INJECTION INTRAMUSCULAR; INTRAVENOUS at 00:32

## 2017-05-04 RX ADMIN — SODIUM CHLORIDE: 9 INJECTION, SOLUTION INTRAVENOUS at 03:13

## 2017-05-04 RX ADMIN — Medication 600 MG: at 03:13

## 2017-05-04 RX ADMIN — FENTANYL CITRATE 100 MCG: 50 INJECTION INTRAMUSCULAR; INTRAVENOUS at 04:55

## 2017-05-04 RX ADMIN — ENOXAPARIN SODIUM 30 MG: 100 INJECTION SUBCUTANEOUS at 21:07

## 2017-05-04 RX ADMIN — ALBUTEROL SULFATE 6 PUFF: 90 AEROSOL, METERED RESPIRATORY (INHALATION) at 08:14

## 2017-05-04 RX ADMIN — FAMOTIDINE 20 MG: 10 INJECTION, SOLUTION INTRAVENOUS at 08:49

## 2017-05-04 RX ADMIN — NIMODIPINE 60 MG: 60 SOLUTION ORAL at 08:48

## 2017-05-04 RX ADMIN — BROMOCRIPTINE MESYLATE 5 MG: 2.5 TABLET ORAL at 08:49

## 2017-05-04 RX ADMIN — ENOXAPARIN SODIUM 30 MG: 100 INJECTION SUBCUTANEOUS at 08:51

## 2017-05-04 RX ADMIN — NIMODIPINE 60 MG: 60 SOLUTION ORAL at 16:02

## 2017-05-04 RX ADMIN — NIMODIPINE 60 MG: 60 SOLUTION ORAL at 23:50

## 2017-05-04 RX ADMIN — POTASSIUM CHLORIDE 20 MEQ: 29.8 INJECTION, SOLUTION INTRAVENOUS at 11:01

## 2017-05-04 RX ADMIN — ALBUTEROL SULFATE 6 PUFF: 90 AEROSOL, METERED RESPIRATORY (INHALATION) at 03:38

## 2017-05-04 RX ADMIN — NIMODIPINE 60 MG: 60 SOLUTION ORAL at 11:13

## 2017-05-04 RX ADMIN — Medication 600 MG: at 15:57

## 2017-05-04 RX ADMIN — ASPIRIN 81 MG: 81 TABLET, CHEWABLE ORAL at 08:49

## 2017-05-04 RX ADMIN — LIDOCAINE HYDROCHLORIDE 4 ML: 40 INJECTION, SOLUTION RETROBULBAR; TOPICAL at 06:13

## 2017-05-04 RX ADMIN — SIMVASTATIN 80 MG: 40 TABLET, FILM COATED ORAL at 21:07

## 2017-05-04 RX ADMIN — BROMOCRIPTINE MESYLATE 5 MG: 2.5 TABLET ORAL at 21:07

## 2017-05-04 RX ADMIN — NIMODIPINE 30 MG: 60 SOLUTION ORAL at 00:30

## 2017-05-04 RX ADMIN — FLUTICASONE PROPIONATE 1 SPRAY: 50 SPRAY, METERED NASAL at 08:55

## 2017-05-04 RX ADMIN — POTASSIUM CHLORIDE 20 MEQ: 29.8 INJECTION, SOLUTION INTRAVENOUS at 13:19

## 2017-05-04 RX ADMIN — POTASSIUM CHLORIDE 20 MEQ: 29.8 INJECTION, SOLUTION INTRAVENOUS at 14:30

## 2017-05-04 RX ADMIN — NIMODIPINE 60 MG: 60 SOLUTION ORAL at 20:15

## 2017-05-04 ASSESSMENT — PULMONARY FUNCTION TESTS
PIF_VALUE: 16
PIF_VALUE: 17
PIF_VALUE: 12
PIF_VALUE: 12
PIF_VALUE: 16
PIF_VALUE: 12
PIF_VALUE: 17
PIF_VALUE: 12
PIF_VALUE: 16
PIF_VALUE: 12
PIF_VALUE: 16
PIF_VALUE: 17
PIF_VALUE: 20
PIF_VALUE: 12
PIF_VALUE: 9
PIF_VALUE: 16

## 2017-05-04 ASSESSMENT — PAIN SCALES - WONG BAKER
WONGBAKER_NUMERICALRESPONSE: 8
WONGBAKER_NUMERICALRESPONSE: 8

## 2017-05-04 ASSESSMENT — PAIN SCALES - GENERAL
PAINLEVEL_OUTOF10: 8
PAINLEVEL_OUTOF10: 0
PAINLEVEL_OUTOF10: 8

## 2017-05-05 LAB
ABSOLUTE EOS #: 0 K/UL (ref 0–0.4)
ABSOLUTE LYMPH #: 0.8 K/UL (ref 1–4.8)
ABSOLUTE MONO #: 0.6 K/UL (ref 0.1–1.2)
ANION GAP SERPL CALCULATED.3IONS-SCNC: 14 MMOL/L (ref 9–17)
BASOPHILS # BLD: 0 %
BASOPHILS ABSOLUTE: 0 K/UL (ref 0–0.2)
BUN BLDV-MCNC: 6 MG/DL (ref 6–20)
BUN/CREAT BLD: ABNORMAL (ref 9–20)
CALCIUM SERPL-MCNC: 8.2 MG/DL (ref 8.6–10.4)
CHLORIDE BLD-SCNC: 101 MMOL/L (ref 98–107)
CO2: 25 MMOL/L (ref 20–31)
CREAT SERPL-MCNC: 0.22 MG/DL (ref 0.5–0.9)
CULTURE: NORMAL
DIFFERENTIAL TYPE: ABNORMAL
EOSINOPHILS RELATIVE PERCENT: 0 %
GFR AFRICAN AMERICAN: >60 ML/MIN
GFR NON-AFRICAN AMERICAN: >60 ML/MIN
GFR SERPL CREATININE-BSD FRML MDRD: ABNORMAL ML/MIN/{1.73_M2}
GFR SERPL CREATININE-BSD FRML MDRD: ABNORMAL ML/MIN/{1.73_M2}
GLUCOSE BLD-MCNC: 101 MG/DL (ref 65–105)
GLUCOSE BLD-MCNC: 101 MG/DL (ref 70–99)
GLUCOSE BLD-MCNC: 115 MG/DL (ref 65–105)
GLUCOSE BLD-MCNC: 90 MG/DL (ref 65–105)
HCT VFR BLD CALC: 27.4 % (ref 36–46)
HEMOGLOBIN: 9.1 G/DL (ref 12–16)
LYMPHOCYTES # BLD: 11 %
Lab: NORMAL
Lab: NORMAL
MCH RBC QN AUTO: 30.8 PG (ref 26–34)
MCHC RBC AUTO-ENTMCNC: 33.2 G/DL (ref 31–37)
MCV RBC AUTO: 92.7 FL (ref 80–100)
MONOCYTES # BLD: 8 %
PDW BLD-RTO: 15.4 % (ref 12.5–15.4)
PLATELET # BLD: 491 K/UL (ref 140–450)
PLATELET ESTIMATE: ABNORMAL
PMV BLD AUTO: 6.5 FL (ref 6–12)
POTASSIUM SERPL-SCNC: 2.8 MMOL/L (ref 3.7–5.3)
RBC # BLD: 2.95 M/UL (ref 4–5.2)
RBC # BLD: ABNORMAL 10*6/UL
SEG NEUTROPHILS: 81 %
SEGMENTED NEUTROPHILS ABSOLUTE COUNT: 6.2 K/UL (ref 1.8–7.7)
SODIUM BLD-SCNC: 140 MMOL/L (ref 135–144)
SPECIMEN DESCRIPTION: NORMAL
SPECIMEN DESCRIPTION: NORMAL
STATUS: NORMAL
STATUS: NORMAL
WBC # BLD: 7.7 K/UL (ref 3.5–11)
WBC # BLD: ABNORMAL 10*3/UL

## 2017-05-05 PROCEDURE — S0028 INJECTION, FAMOTIDINE, 20 MG: HCPCS | Performed by: EMERGENCY MEDICINE

## 2017-05-05 PROCEDURE — 99233 SBSQ HOSP IP/OBS HIGH 50: CPT | Performed by: PSYCHIATRY & NEUROLOGY

## 2017-05-05 PROCEDURE — 97110 THERAPEUTIC EXERCISES: CPT

## 2017-05-05 PROCEDURE — 6370000000 HC RX 637 (ALT 250 FOR IP): Performed by: EMERGENCY MEDICINE

## 2017-05-05 PROCEDURE — 2500000003 HC RX 250 WO HCPCS: Performed by: EMERGENCY MEDICINE

## 2017-05-05 PROCEDURE — 6360000002 HC RX W HCPCS: Performed by: SURGERY

## 2017-05-05 PROCEDURE — 6370000000 HC RX 637 (ALT 250 FOR IP): Performed by: INTERNAL MEDICINE

## 2017-05-05 PROCEDURE — 6360000002 HC RX W HCPCS: Performed by: EMERGENCY MEDICINE

## 2017-05-05 PROCEDURE — 82947 ASSAY GLUCOSE BLOOD QUANT: CPT

## 2017-05-05 PROCEDURE — 36415 COLL VENOUS BLD VENIPUNCTURE: CPT

## 2017-05-05 PROCEDURE — 85025 COMPLETE CBC W/AUTO DIFF WBC: CPT

## 2017-05-05 PROCEDURE — 6370000000 HC RX 637 (ALT 250 FOR IP): Performed by: PSYCHIATRY & NEUROLOGY

## 2017-05-05 PROCEDURE — 2060000000 HC ICU INTERMEDIATE R&B

## 2017-05-05 PROCEDURE — 2580000003 HC RX 258: Performed by: NEUROLOGICAL SURGERY

## 2017-05-05 PROCEDURE — A4629 TRACHEOSTOMY CARE KIT: HCPCS

## 2017-05-05 PROCEDURE — 99291 CRITICAL CARE FIRST HOUR: CPT | Performed by: INTERNAL MEDICINE

## 2017-05-05 PROCEDURE — 94770 HC ETCO2 MONITOR DAILY: CPT

## 2017-05-05 PROCEDURE — 94003 VENT MGMT INPAT SUBQ DAY: CPT

## 2017-05-05 PROCEDURE — 94762 N-INVAS EAR/PLS OXIMTRY CONT: CPT

## 2017-05-05 PROCEDURE — 80048 BASIC METABOLIC PNL TOTAL CA: CPT

## 2017-05-05 RX ADMIN — BROMOCRIPTINE MESYLATE 5 MG: 2.5 TABLET ORAL at 21:34

## 2017-05-05 RX ADMIN — NIMODIPINE 60 MG: 60 SOLUTION ORAL at 21:34

## 2017-05-05 RX ADMIN — POTASSIUM CHLORIDE 10 MEQ: 7.46 INJECTION, SOLUTION INTRAVENOUS at 07:06

## 2017-05-05 RX ADMIN — POTASSIUM CHLORIDE 10 MEQ: 7.46 INJECTION, SOLUTION INTRAVENOUS at 15:48

## 2017-05-05 RX ADMIN — POTASSIUM CHLORIDE 10 MEQ: 7.46 INJECTION, SOLUTION INTRAVENOUS at 08:54

## 2017-05-05 RX ADMIN — FLUTICASONE PROPIONATE 1 SPRAY: 50 SPRAY, METERED NASAL at 08:54

## 2017-05-05 RX ADMIN — NIMODIPINE 60 MG: 60 SOLUTION ORAL at 03:54

## 2017-05-05 RX ADMIN — CLINDAMYCIN HYDROCHLORIDE 300 MG: 150 CAPSULE ORAL at 11:54

## 2017-05-05 RX ADMIN — POTASSIUM CHLORIDE 10 MEQ: 7.46 INJECTION, SOLUTION INTRAVENOUS at 17:47

## 2017-05-05 RX ADMIN — CLINDAMYCIN HYDROCHLORIDE 300 MG: 150 CAPSULE ORAL at 00:07

## 2017-05-05 RX ADMIN — ASPIRIN 81 MG: 81 TABLET, CHEWABLE ORAL at 08:54

## 2017-05-05 RX ADMIN — CLINDAMYCIN HYDROCHLORIDE 300 MG: 150 CAPSULE ORAL at 17:47

## 2017-05-05 RX ADMIN — FAMOTIDINE 20 MG: 10 INJECTION, SOLUTION INTRAVENOUS at 08:54

## 2017-05-05 RX ADMIN — Medication 10 ML: at 08:54

## 2017-05-05 RX ADMIN — NIMODIPINE 60 MG: 60 SOLUTION ORAL at 11:54

## 2017-05-05 RX ADMIN — NIMODIPINE 60 MG: 60 SOLUTION ORAL at 08:53

## 2017-05-05 RX ADMIN — POTASSIUM CHLORIDE 10 MEQ: 7.46 INJECTION, SOLUTION INTRAVENOUS at 14:18

## 2017-05-05 RX ADMIN — OXYCODONE HYDROCHLORIDE 10 MG: 5 TABLET ORAL at 03:54

## 2017-05-05 RX ADMIN — ENOXAPARIN SODIUM 30 MG: 100 INJECTION SUBCUTANEOUS at 21:35

## 2017-05-05 RX ADMIN — CLINDAMYCIN HYDROCHLORIDE 300 MG: 150 CAPSULE ORAL at 06:46

## 2017-05-05 RX ADMIN — FAMOTIDINE 20 MG: 10 INJECTION, SOLUTION INTRAVENOUS at 00:07

## 2017-05-05 RX ADMIN — ENOXAPARIN SODIUM 30 MG: 100 INJECTION SUBCUTANEOUS at 08:54

## 2017-05-05 RX ADMIN — FAMOTIDINE 20 MG: 10 INJECTION, SOLUTION INTRAVENOUS at 21:59

## 2017-05-05 RX ADMIN — BROMOCRIPTINE MESYLATE 5 MG: 2.5 TABLET ORAL at 08:54

## 2017-05-05 RX ADMIN — POTASSIUM CHLORIDE 10 MEQ: 7.46 INJECTION, SOLUTION INTRAVENOUS at 18:52

## 2017-05-05 RX ADMIN — NIMODIPINE 60 MG: 60 SOLUTION ORAL at 16:05

## 2017-05-05 RX ADMIN — SIMVASTATIN 80 MG: 40 TABLET, FILM COATED ORAL at 21:35

## 2017-05-05 ASSESSMENT — PULMONARY FUNCTION TESTS
PIF_VALUE: 9
PIF_VALUE: 14
PIF_VALUE: 15
PIF_VALUE: 12
PIF_VALUE: 37
PIF_VALUE: 11
PIF_VALUE: 11
PIF_VALUE: 15
PIF_VALUE: 11

## 2017-05-06 LAB
ABSOLUTE EOS #: 0.1 K/UL (ref 0–0.4)
ABSOLUTE LYMPH #: 1.1 K/UL (ref 1–4.8)
ABSOLUTE MONO #: 0.6 K/UL (ref 0.1–1.2)
ANION GAP SERPL CALCULATED.3IONS-SCNC: 14 MMOL/L (ref 9–17)
BASOPHILS # BLD: 0 %
BASOPHILS ABSOLUTE: 0 K/UL (ref 0–0.2)
BUN BLDV-MCNC: 10 MG/DL (ref 6–20)
BUN/CREAT BLD: ABNORMAL (ref 9–20)
CALCIUM SERPL-MCNC: 8.3 MG/DL (ref 8.6–10.4)
CHLORIDE BLD-SCNC: 107 MMOL/L (ref 98–107)
CO2: 22 MMOL/L (ref 20–31)
CREAT SERPL-MCNC: 0.23 MG/DL (ref 0.5–0.9)
DIFFERENTIAL TYPE: ABNORMAL
EOSINOPHILS RELATIVE PERCENT: 1 %
GFR AFRICAN AMERICAN: >60 ML/MIN
GFR NON-AFRICAN AMERICAN: >60 ML/MIN
GFR SERPL CREATININE-BSD FRML MDRD: ABNORMAL ML/MIN/{1.73_M2}
GFR SERPL CREATININE-BSD FRML MDRD: ABNORMAL ML/MIN/{1.73_M2}
GLUCOSE BLD-MCNC: 102 MG/DL (ref 65–105)
GLUCOSE BLD-MCNC: 113 MG/DL (ref 65–105)
GLUCOSE BLD-MCNC: 114 MG/DL (ref 70–99)
GLUCOSE BLD-MCNC: 120 MG/DL (ref 65–105)
HCT VFR BLD CALC: 27.6 % (ref 36–46)
HEMOGLOBIN: 9.2 G/DL (ref 12–16)
LYMPHOCYTES # BLD: 15 %
MAGNESIUM: 1.6 MG/DL (ref 1.6–2.6)
MCH RBC QN AUTO: 31.1 PG (ref 26–34)
MCHC RBC AUTO-ENTMCNC: 33.3 G/DL (ref 31–37)
MCV RBC AUTO: 93.3 FL (ref 80–100)
MONOCYTES # BLD: 8 %
PDW BLD-RTO: 15.6 % (ref 12.5–15.4)
PLATELET # BLD: 551 K/UL (ref 140–450)
PLATELET ESTIMATE: ABNORMAL
PMV BLD AUTO: 6.6 FL (ref 6–12)
POTASSIUM SERPL-SCNC: 3.3 MMOL/L (ref 3.7–5.3)
RBC # BLD: 2.96 M/UL (ref 4–5.2)
RBC # BLD: ABNORMAL 10*6/UL
SEG NEUTROPHILS: 76 %
SEGMENTED NEUTROPHILS ABSOLUTE COUNT: 5.6 K/UL (ref 1.8–7.7)
SODIUM BLD-SCNC: 143 MMOL/L (ref 135–144)
WBC # BLD: 7.4 K/UL (ref 3.5–11)
WBC # BLD: ABNORMAL 10*3/UL

## 2017-05-06 PROCEDURE — 80048 BASIC METABOLIC PNL TOTAL CA: CPT

## 2017-05-06 PROCEDURE — 6370000000 HC RX 637 (ALT 250 FOR IP): Performed by: NEUROLOGICAL SURGERY

## 2017-05-06 PROCEDURE — 6370000000 HC RX 637 (ALT 250 FOR IP): Performed by: EMERGENCY MEDICINE

## 2017-05-06 PROCEDURE — 2580000003 HC RX 258: Performed by: NEUROLOGICAL SURGERY

## 2017-05-06 PROCEDURE — 2060000000 HC ICU INTERMEDIATE R&B

## 2017-05-06 PROCEDURE — 94003 VENT MGMT INPAT SUBQ DAY: CPT

## 2017-05-06 PROCEDURE — 6360000002 HC RX W HCPCS: Performed by: SURGERY

## 2017-05-06 PROCEDURE — 2500000003 HC RX 250 WO HCPCS: Performed by: EMERGENCY MEDICINE

## 2017-05-06 PROCEDURE — S0028 INJECTION, FAMOTIDINE, 20 MG: HCPCS | Performed by: EMERGENCY MEDICINE

## 2017-05-06 PROCEDURE — 36415 COLL VENOUS BLD VENIPUNCTURE: CPT

## 2017-05-06 PROCEDURE — 2580000003 HC RX 258

## 2017-05-06 PROCEDURE — 85025 COMPLETE CBC W/AUTO DIFF WBC: CPT

## 2017-05-06 PROCEDURE — A4629 TRACHEOSTOMY CARE KIT: HCPCS

## 2017-05-06 PROCEDURE — 94770 HC ETCO2 MONITOR DAILY: CPT

## 2017-05-06 PROCEDURE — 6360000002 HC RX W HCPCS: Performed by: INTERNAL MEDICINE

## 2017-05-06 PROCEDURE — 6370000000 HC RX 637 (ALT 250 FOR IP): Performed by: PSYCHIATRY & NEUROLOGY

## 2017-05-06 PROCEDURE — 82947 ASSAY GLUCOSE BLOOD QUANT: CPT

## 2017-05-06 PROCEDURE — 2580000003 HC RX 258: Performed by: EMERGENCY MEDICINE

## 2017-05-06 PROCEDURE — 83735 ASSAY OF MAGNESIUM: CPT

## 2017-05-06 PROCEDURE — 94762 N-INVAS EAR/PLS OXIMTRY CONT: CPT

## 2017-05-06 PROCEDURE — 6370000000 HC RX 637 (ALT 250 FOR IP): Performed by: INTERNAL MEDICINE

## 2017-05-06 PROCEDURE — 99233 SBSQ HOSP IP/OBS HIGH 50: CPT | Performed by: INTERNAL MEDICINE

## 2017-05-06 RX ORDER — LEVETIRACETAM 500 MG/1
1000 TABLET ORAL 2 TIMES DAILY
Status: DISCONTINUED | OUTPATIENT
Start: 2017-05-06 | End: 2017-05-06

## 2017-05-06 RX ORDER — MAGNESIUM HYDROXIDE 1200 MG/15ML
LIQUID ORAL
Status: COMPLETED
Start: 2017-05-06 | End: 2017-05-06

## 2017-05-06 RX ADMIN — FLUTICASONE PROPIONATE 1 SPRAY: 50 SPRAY, METERED NASAL at 08:48

## 2017-05-06 RX ADMIN — BROMOCRIPTINE MESYLATE 5 MG: 2.5 TABLET ORAL at 08:48

## 2017-05-06 RX ADMIN — FENTANYL CITRATE 50 MCG: 50 INJECTION INTRAMUSCULAR; INTRAVENOUS at 04:57

## 2017-05-06 RX ADMIN — OXYCODONE HYDROCHLORIDE 10 MG: 5 TABLET ORAL at 14:57

## 2017-05-06 RX ADMIN — ASPIRIN 81 MG: 81 TABLET, CHEWABLE ORAL at 08:48

## 2017-05-06 RX ADMIN — Medication 10 ML: at 20:33

## 2017-05-06 RX ADMIN — DOCUSATE SODIUM 100 MG: 50 LIQUID ORAL at 20:33

## 2017-05-06 RX ADMIN — NIMODIPINE 60 MG: 60 SOLUTION ORAL at 02:03

## 2017-05-06 RX ADMIN — OXYCODONE HYDROCHLORIDE 10 MG: 5 TABLET ORAL at 08:45

## 2017-05-06 RX ADMIN — CLINDAMYCIN HYDROCHLORIDE 300 MG: 150 CAPSULE ORAL at 14:58

## 2017-05-06 RX ADMIN — SODIUM CHLORIDE 1000 ML: 900 IRRIGANT IRRIGATION at 14:59

## 2017-05-06 RX ADMIN — CLINDAMYCIN HYDROCHLORIDE 300 MG: 150 CAPSULE ORAL at 18:58

## 2017-05-06 RX ADMIN — Medication 10 ML: at 08:50

## 2017-05-06 RX ADMIN — CLINDAMYCIN HYDROCHLORIDE 300 MG: 150 CAPSULE ORAL at 06:59

## 2017-05-06 RX ADMIN — OXYCODONE HYDROCHLORIDE 10 MG: 5 TABLET ORAL at 21:57

## 2017-05-06 RX ADMIN — FAMOTIDINE 20 MG: 10 INJECTION, SOLUTION INTRAVENOUS at 08:47

## 2017-05-06 RX ADMIN — POTASSIUM CHLORIDE 40 MEQ: 40 SOLUTION ORAL at 08:45

## 2017-05-06 RX ADMIN — ENOXAPARIN SODIUM 30 MG: 100 INJECTION SUBCUTANEOUS at 08:47

## 2017-05-06 RX ADMIN — ENOXAPARIN SODIUM 30 MG: 100 INJECTION SUBCUTANEOUS at 20:33

## 2017-05-06 RX ADMIN — BROMOCRIPTINE MESYLATE 5 MG: 2.5 TABLET ORAL at 20:34

## 2017-05-06 RX ADMIN — FAMOTIDINE 20 MG: 10 INJECTION, SOLUTION INTRAVENOUS at 20:34

## 2017-05-06 RX ADMIN — CLINDAMYCIN HYDROCHLORIDE 300 MG: 150 CAPSULE ORAL at 00:49

## 2017-05-06 ASSESSMENT — PAIN SCALES - WONG BAKER
WONGBAKER_NUMERICALRESPONSE: 6
WONGBAKER_NUMERICALRESPONSE: 6
WONGBAKER_NUMERICALRESPONSE: 2

## 2017-05-06 ASSESSMENT — PULMONARY FUNCTION TESTS
PIF_VALUE: 16
PIF_VALUE: 17
PIF_VALUE: 12
PIF_VALUE: 12
PIF_VALUE: 17
PIF_VALUE: 16

## 2017-05-06 ASSESSMENT — PAIN SCALES - GENERAL
PAINLEVEL_OUTOF10: 8
PAINLEVEL_OUTOF10: 2

## 2017-05-07 LAB
ABSOLUTE EOS #: 0.1 K/UL (ref 0–0.4)
ABSOLUTE LYMPH #: 1.4 K/UL (ref 1–4.8)
ABSOLUTE MONO #: 0.6 K/UL (ref 0.1–1.2)
ANION GAP SERPL CALCULATED.3IONS-SCNC: 13 MMOL/L (ref 9–17)
BASOPHILS # BLD: 0 %
BASOPHILS ABSOLUTE: 0 K/UL (ref 0–0.2)
BUN BLDV-MCNC: 11 MG/DL (ref 6–20)
BUN/CREAT BLD: ABNORMAL (ref 9–20)
CALCIUM SERPL-MCNC: 8.2 MG/DL (ref 8.6–10.4)
CHLORIDE BLD-SCNC: 103 MMOL/L (ref 98–107)
CO2: 24 MMOL/L (ref 20–31)
CREAT SERPL-MCNC: 0.22 MG/DL (ref 0.5–0.9)
DIFFERENTIAL TYPE: ABNORMAL
EOSINOPHILS RELATIVE PERCENT: 2 %
GFR AFRICAN AMERICAN: >60 ML/MIN
GFR NON-AFRICAN AMERICAN: >60 ML/MIN
GFR SERPL CREATININE-BSD FRML MDRD: ABNORMAL ML/MIN/{1.73_M2}
GFR SERPL CREATININE-BSD FRML MDRD: ABNORMAL ML/MIN/{1.73_M2}
GLUCOSE BLD-MCNC: 103 MG/DL (ref 70–99)
GLUCOSE BLD-MCNC: 108 MG/DL (ref 65–105)
GLUCOSE BLD-MCNC: 111 MG/DL (ref 65–105)
GLUCOSE BLD-MCNC: 112 MG/DL (ref 65–105)
GLUCOSE BLD-MCNC: 113 MG/DL (ref 65–105)
GLUCOSE BLD-MCNC: 120 MG/DL (ref 65–105)
HCT VFR BLD CALC: 27.5 % (ref 36–46)
HEMOGLOBIN: 9 G/DL (ref 12–16)
LYMPHOCYTES # BLD: 17 %
MCH RBC QN AUTO: 30.6 PG (ref 26–34)
MCHC RBC AUTO-ENTMCNC: 32.7 G/DL (ref 31–37)
MCV RBC AUTO: 93.6 FL (ref 80–100)
MONOCYTES # BLD: 7 %
PDW BLD-RTO: 16.3 % (ref 12.5–15.4)
PLATELET # BLD: 581 K/UL (ref 140–450)
PLATELET ESTIMATE: ABNORMAL
PMV BLD AUTO: 6.8 FL (ref 6–12)
POTASSIUM SERPL-SCNC: 3.7 MMOL/L (ref 3.7–5.3)
RBC # BLD: 2.94 M/UL (ref 4–5.2)
RBC # BLD: ABNORMAL 10*6/UL
SEG NEUTROPHILS: 74 %
SEGMENTED NEUTROPHILS ABSOLUTE COUNT: 6.1 K/UL (ref 1.8–7.7)
SODIUM BLD-SCNC: 140 MMOL/L (ref 135–144)
WBC # BLD: 8.2 K/UL (ref 3.5–11)
WBC # BLD: ABNORMAL 10*3/UL

## 2017-05-07 PROCEDURE — 36415 COLL VENOUS BLD VENIPUNCTURE: CPT

## 2017-05-07 PROCEDURE — 6360000002 HC RX W HCPCS: Performed by: SURGERY

## 2017-05-07 PROCEDURE — 6370000000 HC RX 637 (ALT 250 FOR IP): Performed by: INTERNAL MEDICINE

## 2017-05-07 PROCEDURE — 94003 VENT MGMT INPAT SUBQ DAY: CPT

## 2017-05-07 PROCEDURE — S0028 INJECTION, FAMOTIDINE, 20 MG: HCPCS | Performed by: EMERGENCY MEDICINE

## 2017-05-07 PROCEDURE — 82947 ASSAY GLUCOSE BLOOD QUANT: CPT

## 2017-05-07 PROCEDURE — 2580000003 HC RX 258: Performed by: PSYCHIATRY & NEUROLOGY

## 2017-05-07 PROCEDURE — 99233 SBSQ HOSP IP/OBS HIGH 50: CPT | Performed by: INTERNAL MEDICINE

## 2017-05-07 PROCEDURE — 6370000000 HC RX 637 (ALT 250 FOR IP): Performed by: PSYCHIATRY & NEUROLOGY

## 2017-05-07 PROCEDURE — 99232 SBSQ HOSP IP/OBS MODERATE 35: CPT | Performed by: PSYCHIATRY & NEUROLOGY

## 2017-05-07 PROCEDURE — A4629 TRACHEOSTOMY CARE KIT: HCPCS

## 2017-05-07 PROCEDURE — 80048 BASIC METABOLIC PNL TOTAL CA: CPT

## 2017-05-07 PROCEDURE — 2500000003 HC RX 250 WO HCPCS: Performed by: EMERGENCY MEDICINE

## 2017-05-07 PROCEDURE — 85025 COMPLETE CBC W/AUTO DIFF WBC: CPT

## 2017-05-07 PROCEDURE — 94762 N-INVAS EAR/PLS OXIMTRY CONT: CPT

## 2017-05-07 PROCEDURE — 6360000002 HC RX W HCPCS: Performed by: INTERNAL MEDICINE

## 2017-05-07 PROCEDURE — 94770 HC ETCO2 MONITOR DAILY: CPT

## 2017-05-07 PROCEDURE — 6370000000 HC RX 637 (ALT 250 FOR IP): Performed by: EMERGENCY MEDICINE

## 2017-05-07 PROCEDURE — 2060000000 HC ICU INTERMEDIATE R&B

## 2017-05-07 PROCEDURE — 2580000003 HC RX 258: Performed by: NEUROLOGICAL SURGERY

## 2017-05-07 RX ADMIN — FAMOTIDINE 20 MG: 10 INJECTION, SOLUTION INTRAVENOUS at 21:29

## 2017-05-07 RX ADMIN — FLUTICASONE PROPIONATE 1 SPRAY: 50 SPRAY, METERED NASAL at 07:52

## 2017-05-07 RX ADMIN — ASPIRIN 81 MG: 81 TABLET, CHEWABLE ORAL at 07:50

## 2017-05-07 RX ADMIN — Medication 10 ML: at 21:29

## 2017-05-07 RX ADMIN — BROMOCRIPTINE MESYLATE 5 MG: 2.5 TABLET ORAL at 07:50

## 2017-05-07 RX ADMIN — CLINDAMYCIN HYDROCHLORIDE 300 MG: 150 CAPSULE ORAL at 05:41

## 2017-05-07 RX ADMIN — FENTANYL CITRATE 100 MCG: 50 INJECTION INTRAMUSCULAR; INTRAVENOUS at 05:58

## 2017-05-07 RX ADMIN — ENOXAPARIN SODIUM 30 MG: 100 INJECTION SUBCUTANEOUS at 07:51

## 2017-05-07 RX ADMIN — CLINDAMYCIN HYDROCHLORIDE 300 MG: 150 CAPSULE ORAL at 00:47

## 2017-05-07 RX ADMIN — FAMOTIDINE 20 MG: 10 INJECTION, SOLUTION INTRAVENOUS at 07:49

## 2017-05-07 RX ADMIN — CLINDAMYCIN HYDROCHLORIDE 300 MG: 150 CAPSULE ORAL at 23:56

## 2017-05-07 RX ADMIN — BROMOCRIPTINE MESYLATE 5 MG: 2.5 TABLET ORAL at 21:28

## 2017-05-07 RX ADMIN — Medication 10 ML: at 07:50

## 2017-05-07 RX ADMIN — ENOXAPARIN SODIUM 30 MG: 100 INJECTION SUBCUTANEOUS at 21:29

## 2017-05-07 RX ADMIN — CLINDAMYCIN HYDROCHLORIDE 300 MG: 150 CAPSULE ORAL at 19:08

## 2017-05-07 RX ADMIN — SODIUM CHLORIDE: 9 INJECTION, SOLUTION INTRAVENOUS at 02:00

## 2017-05-07 RX ADMIN — CLINDAMYCIN HYDROCHLORIDE 300 MG: 150 CAPSULE ORAL at 12:37

## 2017-05-07 RX ADMIN — OXYCODONE HYDROCHLORIDE 10 MG: 5 TABLET ORAL at 19:08

## 2017-05-07 RX ADMIN — OXYCODONE HYDROCHLORIDE 10 MG: 5 TABLET ORAL at 12:37

## 2017-05-07 ASSESSMENT — PULMONARY FUNCTION TESTS
PIF_VALUE: 12
PIF_VALUE: 11
PIF_VALUE: 16
PIF_VALUE: 14
PIF_VALUE: 11

## 2017-05-07 ASSESSMENT — PAIN SCALES - GENERAL
PAINLEVEL_OUTOF10: 0
PAINLEVEL_OUTOF10: 2
PAINLEVEL_OUTOF10: 8

## 2017-05-07 ASSESSMENT — PAIN SCALES - WONG BAKER: WONGBAKER_NUMERICALRESPONSE: 2

## 2017-05-08 ENCOUNTER — APPOINTMENT (OUTPATIENT)
Dept: GENERAL RADIOLOGY | Age: 57
DRG: 004 | End: 2017-05-08
Attending: NEUROLOGICAL SURGERY
Payer: MEDICARE

## 2017-05-08 ENCOUNTER — APPOINTMENT (OUTPATIENT)
Dept: CT IMAGING | Age: 57
DRG: 004 | End: 2017-05-08
Attending: NEUROLOGICAL SURGERY
Payer: MEDICARE

## 2017-05-08 VITALS
BODY MASS INDEX: 26.57 KG/M2 | HEART RATE: 108 BPM | WEIGHT: 155.65 LBS | OXYGEN SATURATION: 99 % | DIASTOLIC BLOOD PRESSURE: 86 MMHG | HEIGHT: 64 IN | TEMPERATURE: 99.6 F | SYSTOLIC BLOOD PRESSURE: 135 MMHG | RESPIRATION RATE: 22 BRPM

## 2017-05-08 LAB
ABSOLUTE EOS #: 0.2 K/UL (ref 0–0.4)
ABSOLUTE LYMPH #: 1.6 K/UL (ref 1–4.8)
ABSOLUTE MONO #: 0.6 K/UL (ref 0.1–1.2)
ANION GAP SERPL CALCULATED.3IONS-SCNC: 14 MMOL/L (ref 9–17)
BASOPHILS # BLD: 1 %
BASOPHILS ABSOLUTE: 0.1 K/UL (ref 0–0.2)
BUN BLDV-MCNC: 10 MG/DL (ref 6–20)
BUN/CREAT BLD: ABNORMAL (ref 9–20)
CALCIUM SERPL-MCNC: 8.8 MG/DL (ref 8.6–10.4)
CHLORIDE BLD-SCNC: 104 MMOL/L (ref 98–107)
CO2: 24 MMOL/L (ref 20–31)
CREAT SERPL-MCNC: 0.26 MG/DL (ref 0.5–0.9)
DIFFERENTIAL TYPE: ABNORMAL
EOSINOPHILS RELATIVE PERCENT: 2 %
GFR AFRICAN AMERICAN: >60 ML/MIN
GFR NON-AFRICAN AMERICAN: >60 ML/MIN
GFR SERPL CREATININE-BSD FRML MDRD: ABNORMAL ML/MIN/{1.73_M2}
GFR SERPL CREATININE-BSD FRML MDRD: ABNORMAL ML/MIN/{1.73_M2}
GLUCOSE BLD-MCNC: 115 MG/DL (ref 70–99)
GLUCOSE BLD-MCNC: 116 MG/DL (ref 65–105)
GLUCOSE BLD-MCNC: 119 MG/DL (ref 65–105)
GLUCOSE BLD-MCNC: 123 MG/DL (ref 65–105)
HCT VFR BLD CALC: 29 % (ref 36–46)
HEMOGLOBIN: 9.4 G/DL (ref 12–16)
LYMPHOCYTES # BLD: 17 %
MCH RBC QN AUTO: 30.4 PG (ref 26–34)
MCHC RBC AUTO-ENTMCNC: 32.4 G/DL (ref 31–37)
MCV RBC AUTO: 93.8 FL (ref 80–100)
MONOCYTES # BLD: 7 %
PDW BLD-RTO: 15.8 % (ref 12.5–15.4)
PLATELET # BLD: 633 K/UL (ref 140–450)
PLATELET ESTIMATE: ABNORMAL
PMV BLD AUTO: 6.9 FL (ref 6–12)
POTASSIUM SERPL-SCNC: 3.8 MMOL/L (ref 3.7–5.3)
RBC # BLD: 3.09 M/UL (ref 4–5.2)
RBC # BLD: ABNORMAL 10*6/UL
SEG NEUTROPHILS: 73 %
SEGMENTED NEUTROPHILS ABSOLUTE COUNT: 6.9 K/UL (ref 1.8–7.7)
SODIUM BLD-SCNC: 142 MMOL/L (ref 135–144)
WBC # BLD: 9.3 K/UL (ref 3.5–11)
WBC # BLD: ABNORMAL 10*3/UL

## 2017-05-08 PROCEDURE — S0028 INJECTION, FAMOTIDINE, 20 MG: HCPCS | Performed by: EMERGENCY MEDICINE

## 2017-05-08 PROCEDURE — 80048 BASIC METABOLIC PNL TOTAL CA: CPT

## 2017-05-08 PROCEDURE — 85025 COMPLETE CBC W/AUTO DIFF WBC: CPT

## 2017-05-08 PROCEDURE — 71010 XR CHEST PORTABLE: CPT

## 2017-05-08 PROCEDURE — 6370000000 HC RX 637 (ALT 250 FOR IP): Performed by: PSYCHIATRY & NEUROLOGY

## 2017-05-08 PROCEDURE — 82947 ASSAY GLUCOSE BLOOD QUANT: CPT

## 2017-05-08 PROCEDURE — 36415 COLL VENOUS BLD VENIPUNCTURE: CPT

## 2017-05-08 PROCEDURE — 6370000000 HC RX 637 (ALT 250 FOR IP): Performed by: INTERNAL MEDICINE

## 2017-05-08 PROCEDURE — 2500000003 HC RX 250 WO HCPCS: Performed by: EMERGENCY MEDICINE

## 2017-05-08 PROCEDURE — A4629 TRACHEOSTOMY CARE KIT: HCPCS

## 2017-05-08 PROCEDURE — 94770 HC ETCO2 MONITOR DAILY: CPT

## 2017-05-08 PROCEDURE — 6370000000 HC RX 637 (ALT 250 FOR IP): Performed by: EMERGENCY MEDICINE

## 2017-05-08 PROCEDURE — 99232 SBSQ HOSP IP/OBS MODERATE 35: CPT | Performed by: NURSE PRACTITIONER

## 2017-05-08 PROCEDURE — 94003 VENT MGMT INPAT SUBQ DAY: CPT

## 2017-05-08 PROCEDURE — 94762 N-INVAS EAR/PLS OXIMTRY CONT: CPT

## 2017-05-08 PROCEDURE — 2580000003 HC RX 258: Performed by: PSYCHIATRY & NEUROLOGY

## 2017-05-08 PROCEDURE — 6360000002 HC RX W HCPCS: Performed by: SURGERY

## 2017-05-08 PROCEDURE — 70450 CT HEAD/BRAIN W/O DYE: CPT

## 2017-05-08 RX ORDER — ALBUTEROL SULFATE 90 UG/1
6 AEROSOL, METERED RESPIRATORY (INHALATION) EVERY 6 HOURS PRN
Qty: 1 INHALER | Refills: 3 | Status: SHIPPED | OUTPATIENT
Start: 2017-05-08 | End: 2017-07-03 | Stop reason: ALTCHOICE

## 2017-05-08 RX ORDER — FLUTICASONE PROPIONATE 50 MCG
1 SPRAY, SUSPENSION (ML) NASAL DAILY
Qty: 1 BOTTLE | Refills: 3 | Status: SHIPPED | OUTPATIENT
Start: 2017-05-08 | End: 2017-07-03 | Stop reason: ALTCHOICE

## 2017-05-08 RX ORDER — ASPIRIN 81 MG/1
81 TABLET, CHEWABLE ORAL DAILY
Qty: 30 TABLET | Refills: 3 | Status: SHIPPED | OUTPATIENT
Start: 2017-05-08 | End: 2017-10-24 | Stop reason: SDUPTHER

## 2017-05-08 RX ORDER — BROMOCRIPTINE MESYLATE 2.5 MG/1
5 TABLET ORAL 2 TIMES DAILY
Qty: 30 TABLET | Refills: 3 | Status: SHIPPED | OUTPATIENT
Start: 2017-05-08 | End: 2017-06-01 | Stop reason: ALTCHOICE

## 2017-05-08 RX ORDER — CLINDAMYCIN HYDROCHLORIDE 300 MG/1
300 CAPSULE ORAL 4 TIMES DAILY
Qty: 40 CAPSULE | Refills: 0 | Status: SHIPPED | OUTPATIENT
Start: 2017-05-08 | End: 2017-05-11

## 2017-05-08 RX ORDER — FAMOTIDINE 20 MG/1
20 TABLET, FILM COATED ORAL 2 TIMES DAILY
Qty: 60 TABLET | Refills: 3 | Status: SHIPPED | OUTPATIENT
Start: 2017-05-08 | End: 2017-07-03 | Stop reason: ALTCHOICE

## 2017-05-08 RX ORDER — CLINDAMYCIN HYDROCHLORIDE 300 MG/1
300 CAPSULE ORAL 4 TIMES DAILY
Qty: 40 CAPSULE | Refills: 0 | Status: SHIPPED | OUTPATIENT
Start: 2017-05-08 | End: 2017-05-08

## 2017-05-08 RX ORDER — OXYCODONE HYDROCHLORIDE 5 MG/1
TABLET ORAL
Qty: 60 TABLET | Refills: 0 | Status: SHIPPED | OUTPATIENT
Start: 2017-05-08 | End: 2017-10-12 | Stop reason: ALTCHOICE

## 2017-05-08 RX ADMIN — CLINDAMYCIN HYDROCHLORIDE 300 MG: 150 CAPSULE ORAL at 05:32

## 2017-05-08 RX ADMIN — CLINDAMYCIN HYDROCHLORIDE 300 MG: 150 CAPSULE ORAL at 18:07

## 2017-05-08 RX ADMIN — CLINDAMYCIN HYDROCHLORIDE 300 MG: 150 CAPSULE ORAL at 13:06

## 2017-05-08 RX ADMIN — OXYCODONE HYDROCHLORIDE 10 MG: 5 TABLET ORAL at 01:30

## 2017-05-08 RX ADMIN — OXYCODONE HYDROCHLORIDE 10 MG: 5 TABLET ORAL at 19:43

## 2017-05-08 RX ADMIN — BROMOCRIPTINE MESYLATE 5 MG: 2.5 TABLET ORAL at 08:17

## 2017-05-08 RX ADMIN — FLUTICASONE PROPIONATE 1 SPRAY: 50 SPRAY, METERED NASAL at 08:18

## 2017-05-08 RX ADMIN — FAMOTIDINE 20 MG: 10 INJECTION, SOLUTION INTRAVENOUS at 08:18

## 2017-05-08 RX ADMIN — OXYCODONE HYDROCHLORIDE 10 MG: 5 TABLET ORAL at 08:17

## 2017-05-08 RX ADMIN — ENOXAPARIN SODIUM 30 MG: 100 INJECTION SUBCUTANEOUS at 08:18

## 2017-05-08 RX ADMIN — ASPIRIN 81 MG: 81 TABLET, CHEWABLE ORAL at 08:18

## 2017-05-08 RX ADMIN — SODIUM CHLORIDE: 9 INJECTION, SOLUTION INTRAVENOUS at 01:00

## 2017-05-08 ASSESSMENT — PAIN SCALES - GENERAL
PAINLEVEL_OUTOF10: 4
PAINLEVEL_OUTOF10: 7
PAINLEVEL_OUTOF10: 0

## 2017-05-08 ASSESSMENT — PULMONARY FUNCTION TESTS
PIF_VALUE: 14
PIF_VALUE: 13
PIF_VALUE: 14
PIF_VALUE: 13

## 2017-05-23 ENCOUNTER — HOSPITAL ENCOUNTER (OUTPATIENT)
Age: 57
Setting detail: SPECIMEN
Discharge: HOME | End: 2017-05-23
Payer: MEDICARE

## 2017-05-23 PROCEDURE — 87086 URINE CULTURE/COLONY COUNT: CPT

## 2017-05-24 LAB
CULTURE: NO GROWTH
CULTURE: NORMAL
Lab: NORMAL
Lab: NORMAL
SPECIMEN DESCRIPTION: NORMAL
SPECIMEN DESCRIPTION: NORMAL
STATUS: NORMAL

## 2017-06-01 ENCOUNTER — OFFICE VISIT (OUTPATIENT)
Dept: NEUROLOGY | Age: 57
End: 2017-06-01
Payer: MEDICARE

## 2017-06-01 VITALS
BODY MASS INDEX: 24.24 KG/M2 | DIASTOLIC BLOOD PRESSURE: 74 MMHG | WEIGHT: 142 LBS | HEART RATE: 90 BPM | HEIGHT: 64 IN | SYSTOLIC BLOOD PRESSURE: 144 MMHG

## 2017-06-01 DIAGNOSIS — I60.9 SUBARACHNOID HEMORRHAGE (HCC): Primary | ICD-10-CM

## 2017-06-01 PROCEDURE — 99214 OFFICE O/P EST MOD 30 MIN: CPT | Performed by: PSYCHIATRY & NEUROLOGY

## 2017-06-01 RX ORDER — NALOXONE HYDROCHLORIDE 0.4 MG/ML
0.4 INJECTION, SOLUTION INTRAMUSCULAR; INTRAVENOUS; SUBCUTANEOUS PRN
COMMUNITY
End: 2017-10-12 | Stop reason: ALTCHOICE

## 2017-06-01 RX ORDER — ACETAMINOPHEN 160 MG/5ML
650 SOLUTION ORAL EVERY 6 HOURS PRN
COMMUNITY
End: 2017-10-17 | Stop reason: ALTCHOICE

## 2017-06-01 RX ORDER — GINSENG 100 MG
CAPSULE ORAL 2 TIMES DAILY
COMMUNITY
End: 2017-10-17 | Stop reason: ALTCHOICE

## 2017-06-01 RX ORDER — PREDNISONE 1 MG/1
5 TABLET ORAL DAILY
COMMUNITY
End: 2017-10-12 | Stop reason: ALTCHOICE

## 2017-06-01 RX ORDER — ONDANSETRON 4 MG/1
4 TABLET, ORALLY DISINTEGRATING ORAL DAILY
COMMUNITY
End: 2017-10-17 | Stop reason: ALTCHOICE

## 2017-06-01 RX ORDER — LEFLUNOMIDE 10 MG/1
20 TABLET ORAL DAILY
COMMUNITY
End: 2017-10-12 | Stop reason: SDUPTHER

## 2017-06-08 ENCOUNTER — TELEPHONE (OUTPATIENT)
Dept: NEUROLOGY | Age: 57
End: 2017-06-08

## 2017-06-13 ENCOUNTER — OFFICE VISIT (OUTPATIENT)
Dept: SURGERY | Age: 57
End: 2017-06-13
Payer: MEDICARE

## 2017-06-13 VITALS
HEART RATE: 105 BPM | TEMPERATURE: 98.3 F | BODY MASS INDEX: 24.41 KG/M2 | SYSTOLIC BLOOD PRESSURE: 147 MMHG | WEIGHT: 143 LBS | DIASTOLIC BLOOD PRESSURE: 93 MMHG | HEIGHT: 64 IN

## 2017-06-13 DIAGNOSIS — Z43.1 PEG (PERCUTANEOUS ENDOSCOPIC GASTROSTOMY) ADJUSTMENT/REPLACEMENT/REMOVAL (HCC): Primary | ICD-10-CM

## 2017-06-13 PROCEDURE — 99024 POSTOP FOLLOW-UP VISIT: CPT | Performed by: SPECIALIST

## 2017-06-23 ENCOUNTER — TELEPHONE (OUTPATIENT)
Dept: NEUROLOGY | Age: 57
End: 2017-06-23

## 2017-07-03 ENCOUNTER — OFFICE VISIT (OUTPATIENT)
Dept: NEUROLOGY | Age: 57
End: 2017-07-03
Payer: MEDICARE

## 2017-07-03 VITALS
SYSTOLIC BLOOD PRESSURE: 164 MMHG | HEIGHT: 64 IN | DIASTOLIC BLOOD PRESSURE: 77 MMHG | WEIGHT: 144.2 LBS | BODY MASS INDEX: 24.62 KG/M2 | HEART RATE: 99 BPM

## 2017-07-03 DIAGNOSIS — I67.1 CEREBRAL ANEURYSM: ICD-10-CM

## 2017-07-03 PROCEDURE — 99214 OFFICE O/P EST MOD 30 MIN: CPT | Performed by: PSYCHIATRY & NEUROLOGY

## 2017-07-06 ENCOUNTER — TELEPHONE (OUTPATIENT)
Dept: NEUROLOGY | Age: 57
End: 2017-07-06

## 2017-07-06 DIAGNOSIS — I60.9 SUBARACHNOID HEMORRHAGE (HCC): Primary | ICD-10-CM

## 2017-07-06 DIAGNOSIS — I67.1 CEREBRAL ANEURYSM: ICD-10-CM

## 2017-07-06 RX ORDER — BUTALBITAL, ACETAMINOPHEN AND CAFFEINE 300; 40; 50 MG/1; MG/1; MG/1
CAPSULE ORAL
Qty: 60 CAPSULE | Refills: 0 | Status: SHIPPED | OUTPATIENT
Start: 2017-07-06 | End: 2017-07-11

## 2017-07-07 ENCOUNTER — TELEPHONE (OUTPATIENT)
Dept: NEUROLOGY | Age: 57
End: 2017-07-07

## 2017-07-07 DIAGNOSIS — I60.9 SUBARACHNOID HEMORRHAGE (HCC): ICD-10-CM

## 2017-07-07 DIAGNOSIS — I67.1 CEREBRAL ANEURYSM: ICD-10-CM

## 2017-07-11 ENCOUNTER — TELEPHONE (OUTPATIENT)
Dept: NEUROLOGY | Age: 57
End: 2017-07-11

## 2017-07-11 RX ORDER — BUTALBITAL, ACETAMINOPHEN AND CAFFEINE 50; 325; 40 MG/1; MG/1; MG/1
TABLET ORAL
Qty: 60 TABLET | Refills: 0 | OUTPATIENT
Start: 2017-07-11 | End: 2017-10-31

## 2017-07-12 ENCOUNTER — INITIAL CONSULT (OUTPATIENT)
Dept: NEUROLOGY | Age: 57
End: 2017-07-12
Payer: MEDICARE

## 2017-07-12 VITALS
BODY MASS INDEX: 25.03 KG/M2 | SYSTOLIC BLOOD PRESSURE: 159 MMHG | HEIGHT: 64 IN | TEMPERATURE: 98.1 F | HEART RATE: 109 BPM | DIASTOLIC BLOOD PRESSURE: 86 MMHG | WEIGHT: 146.6 LBS

## 2017-07-12 DIAGNOSIS — I60.2: ICD-10-CM

## 2017-07-12 DIAGNOSIS — Z95.828 MRI-SAFE ENDOVASCULAR ANEURYSM COIL PRESENT: Primary | ICD-10-CM

## 2017-07-12 PROCEDURE — 99214 OFFICE O/P EST MOD 30 MIN: CPT | Performed by: PSYCHIATRY & NEUROLOGY

## 2017-07-12 ASSESSMENT — ENCOUNTER SYMPTOMS
NAUSEA: 0
SHORTNESS OF BREATH: 0
COUGH: 0
PHOTOPHOBIA: 0
COLOR CHANGE: 0
VOMITING: 0
VOICE CHANGE: 0

## 2017-10-12 ENCOUNTER — OFFICE VISIT (OUTPATIENT)
Dept: NEUROLOGY | Age: 57
End: 2017-10-12
Payer: MEDICARE

## 2017-10-12 VITALS
WEIGHT: 147.6 LBS | HEART RATE: 81 BPM | BODY MASS INDEX: 25.2 KG/M2 | SYSTOLIC BLOOD PRESSURE: 156 MMHG | HEIGHT: 64 IN | DIASTOLIC BLOOD PRESSURE: 94 MMHG

## 2017-10-12 DIAGNOSIS — I60.2: Primary | ICD-10-CM

## 2017-10-12 DIAGNOSIS — I67.1 CEREBRAL ANEURYSM: ICD-10-CM

## 2017-10-12 PROCEDURE — 99214 OFFICE O/P EST MOD 30 MIN: CPT | Performed by: PSYCHIATRY & NEUROLOGY

## 2017-10-12 RX ORDER — LEFLUNOMIDE 20 MG/1
1 TABLET ORAL DAILY
COMMUNITY
Start: 2017-10-10

## 2017-10-12 NOTE — PROGRESS NOTES
stroke ([facility]     Arthritis     History of total bilateral knee replacement     RA (rheumatoid arthritis) ([facility]        Past Surgical History:   Procedure Laterality Date    CRANIOTOMY Right 4/16/2017    RIGHT FRONTAL EXTERNAL VENTRICULAR DRAIN PLACEMENT performed by Mariangel Christina MD at [address] [facility] EGD PERCUTANEOUS PLACEMENT GASTROSTOMY TUBE N/A 5/3/2017    EGD ESOPHAGOGASTRODUODENOSCOPY PEG TUBE INSERTION- GI UNIT SCHEDULED performed by Zia Pierce MD at [address] [place] [address] N/A 5/3/2017    TRACHEOTOMY performed by Zia Pierce MD at [facility]         History reviewed. No pertinent family history. Social History     Social History    Marital status:      Spouse name: N/A    Number of children: N/A    Years of education: N/A     Social History Main Topics    Smoking status: Former Smoker     Quit date: 10/12/2015    Smokeless tobacco: Never Used    Alcohol use No    Drug use: No    Sexual activity: Not Asked     Other Topics Concern    None     Social History Narrative    None       Current Outpatient Prescriptions   Medication Sig Dispense Refill    leflunomide (ARAVA) 20 MG tablet Take 1 tablet by mouth daily      butalbital-acetaminophen-caffeine (FIORICET, ESGIC) -40 MG per tablet Take 1 capsule by mouth every 6 hours as needed for disruptive headaches 60 tablet 0    calcium carbonate-vitamin D (CALTRATE) 600-400 MG-UNIT TABS per tab Take 1 tablet by mouth 2 times daily      aspirin 81 MG chewable tablet Take 1 tablet by mouth daily 30 tablet 3    ferrous sulfate 325 (65 FE) MG tablet Take 325 mg by mouth daily (with breakfast)      Multiple Vitamins-Minerals (THERAPEUTIC MULTIVITAMIN-MINERALS) tablet Take 1 tablet by mouth daily      acetaminophen (TYLENOL) 325 MG tablet Take 650 mg by mouth every 6 hours as needed for Pain       No current facility-administered medications for this visit. Facility-Administered Medications Ordered in Other Visits   Medication Dose Route Frequency Provider Last Rate Last Dose    0.9 % sodium chloride infusion   Intravenous Continuous Mehul Tony  mL/hr at 10/17/17 0747      clindamycin (CLEOCIN) 600 mg in dextrose 5 % 50 mL IVPB  600 mg Intravenous Once Fanny Lopez MD        acetaminophen (TYLENOL) tablet 650 mg  650 mg Oral Q4H PRN Hannah Matos MD           Allergies   Allergen Reactions    Pcn [Penicillins] Anaphylaxis    Penicillin G Anaphylaxis    Morphine Hives    Ibuprofen Nausea Only    Tape [Adhesive Tape] Rash       Review of Systems   All other systems reviewed and are negative. Objective:   Physical Exam    Vitals:    10/12/17 1450   BP: (!) 156/94   Pulse: 81     weight: 147 lb 9.6 oz (67 kg)    Neurological Examination  Constitutional .General exam well groomed   Ears /Nose/Throat: external ear . Normal exam  Neck and thyroid . Normal size  Cardiovascular: Auscultation of heart with regular rate and rhythm  Musculoskeletal. Muscle bulk and tone normal                                                           Muscle strength right leg 4+/5 with giveway otherwise 5/5 strength                                                                                 No dysmetria or dysdiadokinesis  No tremor   Normal fine motor  Gait right lower extremity circumduction   Orientation Alert and oriented x 3 . WORLD able to spell forwards and back wards . Serial 7 to 93  . Short term tresa 3 words out of 3 in one minute   Language process and speech normal . No aphasia   Cranial nerve 2 normal acuety and visual fields  Cranial nerve 3, 4 and 6 . Extraocular muscles are intact . Pupils are equal and reactive   Cranial nerve 5 . Normal strength of masseter and temporalis . Intact corneal reflex. Normal facial sensation  Cranial nerve 7 normal exam   Cranial nerve 8. Grossly intact hearing   Cranial nerve 9 and 10.  Symmetric palate elevation Cranial nerve 11 , 5 out of 5 strength   Cranial Nerve 12 midline tongue . No atrophy  Sensation . Normal pinprick and light touch   Deep Tendon Reflexes brisker on right  Plantar response extensor right     Assessment:      1. Subarachnoid hemorrhage from left anterior communicating artery ([address]    2.  Cerebral aneurysm    There has been further improvement of right leg weakness to have cerebral angiogram this coming week through Dr Reno Heredia:      As above

## 2017-10-12 NOTE — LETTER
Orientation Alert and oriented x 3 . WORLD able to spell forwards and back wards . Serial 7 to 93  . Short term tresa 3 words out of 3 in one minute   Language process and speech normal . No aphasia   Cranial nerve 2 normal acuety and visual fields  Cranial nerve 3, 4 and 6 . Extraocular muscles are intact . Pupils are equal and reactive   Cranial nerve 5 . Normal strength of masseter and temporalis . Intact corneal reflex. Normal facial sensation  Cranial nerve 7 normal exam   Cranial nerve 8. Grossly intact hearing   Cranial nerve 9 and 10. Symmetric palate elevation   Cranial nerve 11 , 5 out of 5 strength   Cranial Nerve 12 midline tongue . No atrophy  Sensation . Normal pinprick and light touch   Deep Tendon Reflexes brisker on right  Plantar response extensor right     Assessment:      1. Subarachnoid hemorrhage from left anterior communicating artery ([address]    2. Cerebral aneurysm    There has been further improvement of right leg weakness to have cerebral angiogram this coming week through Dr Roddy Lora:      As above           If you have questions, please do not hesitate to call me. I look forward to following Annette Ndiaye along with you.     Sincerely,        Lewis Johnson MD    CC providers:  Kylie Huitron MD  [address] [place] [address] [facility] [address]  [address] [place] [address] Avenue: [phone]

## 2017-10-17 ENCOUNTER — HOSPITAL ENCOUNTER (OUTPATIENT)
Dept: INTERVENTIONAL RADIOLOGY/VASCULAR | Age: 57
Discharge: HOME | End: 2017-10-17
Attending: PSYCHIATRY & NEUROLOGY
Payer: MEDICARE

## 2017-10-17 VITALS
WEIGHT: 147 LBS | HEIGHT: 64 IN | DIASTOLIC BLOOD PRESSURE: 78 MMHG | RESPIRATION RATE: 25 BRPM | SYSTOLIC BLOOD PRESSURE: 145 MMHG | OXYGEN SATURATION: 100 % | TEMPERATURE: 98.2 F | BODY MASS INDEX: 25.1 KG/M2 | HEART RATE: 102 BPM

## 2017-10-17 LAB
ANION GAP SERPL CALCULATED.3IONS-SCNC: 11 MMOL/L (ref 9–17)
BUN BLDV-MCNC: 9 MG/DL (ref 6–20)
BUN/CREAT BLD: ABNORMAL (ref 9–20)
CALCIUM SERPL-MCNC: 9.3 MG/DL (ref 8.6–10.4)
CHLORIDE BLD-SCNC: 108 MMOL/L (ref 98–107)
CO2: 24 MMOL/L (ref 20–31)
CREAT SERPL-MCNC: 0.39 MG/DL (ref 0.5–0.9)
GFR AFRICAN AMERICAN: >60 ML/MIN
GFR NON-AFRICAN AMERICAN: >60 ML/MIN
GFR SERPL CREATININE-BSD FRML MDRD: ABNORMAL ML/MIN/{1.73_M2}
GFR SERPL CREATININE-BSD FRML MDRD: ABNORMAL ML/MIN/{1.73_M2}
GLUCOSE BLD-MCNC: 88 MG/DL (ref 70–99)
HCT VFR BLD CALC: 43 % (ref 36–46)
HEMOGLOBIN: 14.5 G/DL (ref 12–16)
INR BLD: 1
MCH RBC QN AUTO: 31.4 PG (ref 26–34)
MCHC RBC AUTO-ENTMCNC: 33.8 G/DL (ref 31–37)
MCV RBC AUTO: 93.1 FL (ref 80–100)
PARTIAL THROMBOPLASTIN TIME: 22.6 SEC (ref 21.3–31.3)
PDW BLD-RTO: 14.5 % (ref 12.5–15.4)
PLATELET # BLD: 276 K/UL (ref 140–450)
PMV BLD AUTO: 7.8 FL (ref 6–12)
POTASSIUM SERPL-SCNC: 4 MMOL/L (ref 3.7–5.3)
PROTHROMBIN TIME: 10.3 SEC (ref 9.4–12.6)
RBC # BLD: 4.62 M/UL (ref 4–5.2)
SODIUM BLD-SCNC: 143 MMOL/L (ref 135–144)
WBC # BLD: 5.1 K/UL (ref 3.5–11)

## 2017-10-17 PROCEDURE — 6360000002 HC RX W HCPCS

## 2017-10-17 PROCEDURE — 85610 PROTHROMBIN TIME: CPT

## 2017-10-17 PROCEDURE — 85027 COMPLETE CBC AUTOMATED: CPT

## 2017-10-17 PROCEDURE — 6360000004 HC RX CONTRAST MEDICATION: Performed by: PSYCHIATRY & NEUROLOGY

## 2017-10-17 PROCEDURE — 2580000003 HC RX 258: Performed by: PSYCHIATRY & NEUROLOGY

## 2017-10-17 PROCEDURE — 85730 THROMBOPLASTIN TIME PARTIAL: CPT

## 2017-10-17 PROCEDURE — 80048 BASIC METABOLIC PNL TOTAL CA: CPT

## 2017-10-17 PROCEDURE — 36224 PLACE CATH CAROTD ART: CPT | Performed by: PSYCHIATRY & NEUROLOGY

## 2017-10-17 PROCEDURE — 7100000011 HC PHASE II RECOVERY - ADDTL 15 MIN

## 2017-10-17 PROCEDURE — 7100000010 HC PHASE II RECOVERY - FIRST 15 MIN

## 2017-10-17 PROCEDURE — C1769 GUIDE WIRE: HCPCS

## 2017-10-17 RX ORDER — ACETAMINOPHEN 325 MG/1
650 TABLET ORAL EVERY 4 HOURS PRN
Status: DISCONTINUED | OUTPATIENT
Start: 2017-10-17 | End: 2017-10-20 | Stop reason: HOSPADM

## 2017-10-17 RX ORDER — CLINDAMYCIN PHOSPHATE 600 MG/50ML
600 INJECTION INTRAVENOUS ONCE
Status: DISCONTINUED | OUTPATIENT
Start: 2017-10-17 | End: 2017-10-20 | Stop reason: HOSPADM

## 2017-10-17 RX ORDER — SODIUM CHLORIDE 9 MG/ML
INJECTION, SOLUTION INTRAVENOUS CONTINUOUS
Status: DISCONTINUED | OUTPATIENT
Start: 2017-10-17 | End: 2017-10-20 | Stop reason: HOSPADM

## 2017-10-17 RX ORDER — ACETAMINOPHEN 325 MG/1
650 TABLET ORAL EVERY 6 HOURS PRN
COMMUNITY
End: 2017-10-30 | Stop reason: ALTCHOICE

## 2017-10-17 RX ORDER — IODIXANOL 270 MG/ML
100 INJECTION, SOLUTION INTRAVASCULAR
Status: COMPLETED | OUTPATIENT
Start: 2017-10-17 | End: 2017-10-17

## 2017-10-17 RX ADMIN — SODIUM CHLORIDE: 9 INJECTION, SOLUTION INTRAVENOUS at 07:47

## 2017-10-17 RX ADMIN — IODIXANOL 30 ML: 270 INJECTION, SOLUTION INTRAVASCULAR at 10:24

## 2017-10-17 NOTE — COMMUNICATION BODY
stroke ([facility]     Arthritis     History of total bilateral knee replacement     RA (rheumatoid arthritis) ([facility]        Past Surgical History:   Procedure Laterality Date    CRANIOTOMY Right 4/16/2017    RIGHT FRONTAL EXTERNAL VENTRICULAR DRAIN PLACEMENT performed by Isabel Godoy MD at [address] [facility] EGD PERCUTANEOUS PLACEMENT GASTROSTOMY TUBE N/A 5/3/2017    EGD ESOPHAGOGASTRODUODENOSCOPY PEG TUBE INSERTION- GI UNIT SCHEDULED performed by Joyce Russell MD at [address] N/A 5/3/2017    TRACHEOTOMY performed by Joyce Russell MD at [facility]         History reviewed. No pertinent family history. Social History     Social History    Marital status:      Spouse name: N/A    Number of children: N/A    Years of education: N/A     Social History Main Topics    Smoking status: Former Smoker     Quit date: 10/12/2015    Smokeless tobacco: Never Used    Alcohol use No    Drug use: No    Sexual activity: Not Asked     Other Topics Concern    None     Social History Narrative    None       Current Outpatient Prescriptions   Medication Sig Dispense Refill    leflunomide (ARAVA) 20 MG tablet Take 1 tablet by mouth daily      butalbital-acetaminophen-caffeine (FIORICET, ESGIC) -40 MG per tablet Take 1 capsule by mouth every 6 hours as needed for disruptive headaches 60 tablet 0    calcium carbonate-vitamin D (CALTRATE) 600-400 MG-UNIT TABS per tab Take 1 tablet by mouth 2 times daily      aspirin 81 MG chewable tablet Take 1 tablet by mouth daily 30 tablet 3    ferrous sulfate 325 (65 FE) MG tablet Take 325 mg by mouth daily (with breakfast)      Multiple Vitamins-Minerals (THERAPEUTIC MULTIVITAMIN-MINERALS) tablet Take 1 tablet by mouth daily      acetaminophen (TYLENOL) 325 MG tablet Take 650 mg by mouth every 6 hours as needed for Pain       No current facility-administered medications for this visit.

## 2017-10-17 NOTE — PROGRESS NOTES
Patient admitted, consent signed, all questions answered. Pt ready for procedure. Call light to reach with side rails up 2 of 2. Family at bedside with patient.

## 2017-10-17 NOTE — PROGRESS NOTES
Hematoma area softened. Dr. Fernie Casey and Dr. Savannah Acuna arrived to check Rt femoral area. Fem-Stop applied.

## 2017-10-17 NOTE — H&P
Endovascular Neurosurgery H&P    Pt Name: Senia Bobo  MRN: [id number]  YOB: 1960  Date of evaluation: 10/17/2017  Primary Care Physician: Audi Samayoa MD  Reason for evaluation: 6 month follow up angiogram    SUBJECTIVE:   History of Chief Complaint:    Senia Bobo is a 56 y.o. female with rupture A com aneurysm 4/2017 s/p coiling. She is here for her 6 months follow up angiogram. PEG and Randal Amass has been removed and patient is almost back to herself with the exception of not driving yet. She denies any neurological symptoms. Allergies  is allergic to pcn [penicillins]; penicillin g; morphine; ibuprofen; and tape [adhesive tape]. Medications  Prior to Admission medications    Medication Sig Start Date End Date Taking? Authorizing Provider   leflunomide (ARAVA) 20 MG tablet Take 1 tablet by mouth daily 10/10/17  Yes Historical Provider, MD   calcium carbonate-vitamin D (CALTRATE) 600-400 MG-UNIT TABS per tab Take 1 tablet by mouth 2 times daily   Yes Historical Provider, MD   aspirin 81 MG chewable tablet Take 1 tablet by mouth daily 5/8/17  Yes Maria Del Carmen Borrero PA-C   ferrous sulfate 325 (65 FE) MG tablet Take 325 mg by mouth daily (with breakfast)   Yes Historical Provider, MD   Multiple Vitamins-Minerals (THERAPEUTIC MULTIVITAMIN-MINERALS) tablet Take 1 tablet by mouth daily   Yes Historical Provider, MD   acetaminophen (TYLENOL) 325 MG tablet Take 650 mg by mouth every 6 hours as needed for Pain    Historical Provider, MD   butalbital-acetaminophen-caffeine (FIORICET, ESGIC) -40 MG per tablet Take 1 capsule by mouth every 6 hours as needed for disruptive headaches 7/11/17   Maria M Peterson MD    Scheduled Meds:   clindamycin (CLEOCIN) IV  600 mg Intravenous Once     Continuous Infusions:   sodium chloride 100 mL/hr at 10/17/17 0747     PRN Meds:.  Past Medical History   has a past medical history of 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 ([facility]  Arthritis;

## 2017-10-17 NOTE — PROGRESS NOTES
All discharge instructions reviewed, questions answered, paper signed and given copy. Patient discharged per wheelchair with  and belongings.

## 2017-10-17 NOTE — PROGRESS NOTES
Ambulated to bathroom and in halls. Gait steady. . Right groin puncture site and right pedal pulse assessment unchanged.

## 2017-10-24 DIAGNOSIS — I63.9 CEREBROVASCULAR ACCIDENT (CVA), UNSPECIFIED MECHANISM (HCC): Primary | ICD-10-CM

## 2017-10-24 RX ORDER — ASPIRIN 81 MG/1
81 TABLET, CHEWABLE ORAL DAILY
Qty: 30 TABLET | Refills: 3 | Status: SHIPPED | OUTPATIENT
Start: 2017-10-24 | End: 2017-10-30 | Stop reason: ALTCHOICE

## 2017-10-24 RX ORDER — CLOPIDOGREL BISULFATE 75 MG/1
75 TABLET ORAL DAILY
Qty: 30 TABLET | Refills: 3 | Status: SHIPPED | OUTPATIENT
Start: 2017-10-24 | End: 2018-02-07 | Stop reason: SDUPTHER

## 2017-10-30 ENCOUNTER — ANESTHESIA EVENT (OUTPATIENT)
Dept: INTERVENTIONAL RADIOLOGY/VASCULAR | Age: 57
End: 2017-10-30

## 2017-10-30 RX ORDER — CHLORAL HYDRATE 500 MG
3000 CAPSULE ORAL 3 TIMES DAILY
COMMUNITY

## 2017-10-30 RX ORDER — ASPIRIN 325 MG
325 TABLET ORAL DAILY
COMMUNITY
End: 2019-08-20 | Stop reason: CLARIF

## 2017-10-31 ENCOUNTER — HOSPITAL ENCOUNTER (INPATIENT)
Dept: INTERVENTIONAL RADIOLOGY/VASCULAR | Age: 57
LOS: 1 days | Discharge: HOME | DRG: 024 | End: 2017-11-01
Attending: PSYCHIATRY & NEUROLOGY | Admitting: PSYCHIATRY & NEUROLOGY
Payer: MEDICARE

## 2017-10-31 ENCOUNTER — ANESTHESIA (OUTPATIENT)
Dept: INTERVENTIONAL RADIOLOGY/VASCULAR | Age: 57
End: 2017-10-31

## 2017-10-31 VITALS — OXYGEN SATURATION: 98 % | SYSTOLIC BLOOD PRESSURE: 107 MMHG | DIASTOLIC BLOOD PRESSURE: 60 MMHG | TEMPERATURE: 97.1 F

## 2017-10-31 LAB
ABO/RH: NORMAL
ACTIVATED CLOTTING TIME: 123 SEC (ref 79–149)
ACTIVATED CLOTTING TIME: 213 SEC (ref 79–149)
ACTIVATED CLOTTING TIME: 221 SEC (ref 79–149)
ACTIVATED CLOTTING TIME: 225 SEC (ref 79–149)
ACTIVATED CLOTTING TIME: 225 SEC (ref 79–149)
ACTIVATED CLOTTING TIME: 237 SEC (ref 79–149)
ANTIBODY SCREEN: NEGATIVE
ARM BAND NUMBER: NORMAL
COLLAGEN ADENOSINE-5'-DIPHOSPHATE (ADP) TIME: >300 SEC (ref 67–112)
COLLAGEN EPINEPHRINE TIME: >300 SEC (ref 85–172)
EXPIRATION DATE: NORMAL
HCT VFR BLD CALC: 34.5 % (ref 36.3–47.1)
HEMOGLOBIN: 11.3 G/DL (ref 11.9–15.1)
PLATELET FUNCTION INTERP: ABNORMAL

## 2017-10-31 PROCEDURE — 7100000000 HC PACU RECOVERY - FIRST 15 MIN

## 2017-10-31 PROCEDURE — 6360000002 HC RX W HCPCS: Performed by: NEUROLOGICAL SURGERY

## 2017-10-31 PROCEDURE — 6360000002 HC RX W HCPCS: Performed by: EMERGENCY MEDICINE

## 2017-10-31 PROCEDURE — 2500000003 HC RX 250 WO HCPCS

## 2017-10-31 PROCEDURE — 2780000010 IR ANGIOGRAM CAROTID CEREBRAL BILATERAL

## 2017-10-31 PROCEDURE — 86901 BLOOD TYPING SEROLOGIC RH(D): CPT

## 2017-10-31 PROCEDURE — 6360000004 HC RX CONTRAST MEDICATION: Performed by: NURSE ANESTHETIST, CERTIFIED REGISTERED

## 2017-10-31 PROCEDURE — 2580000003 HC RX 258

## 2017-10-31 PROCEDURE — 7100000001 HC PACU RECOVERY - ADDTL 15 MIN

## 2017-10-31 PROCEDURE — 85014 HEMATOCRIT: CPT

## 2017-10-31 PROCEDURE — 61624 TCAT PERM OCCLS/EMBOLJ CNS: CPT | Performed by: PSYCHIATRY & NEUROLOGY

## 2017-10-31 PROCEDURE — 6360000002 HC RX W HCPCS: Performed by: ANESTHESIOLOGY

## 2017-10-31 PROCEDURE — 36224 PLACE CATH CAROTD ART: CPT | Performed by: PSYCHIATRY & NEUROLOGY

## 2017-10-31 PROCEDURE — 6360000002 HC RX W HCPCS: Performed by: NURSE ANESTHETIST, CERTIFIED REGISTERED

## 2017-10-31 PROCEDURE — S0028 INJECTION, FAMOTIDINE, 20 MG: HCPCS | Performed by: NEUROLOGICAL SURGERY

## 2017-10-31 PROCEDURE — 2580000003 HC RX 258: Performed by: ANESTHESIOLOGY

## 2017-10-31 PROCEDURE — 3700000001 HC ADD 15 MINUTES (ANESTHESIA)

## 2017-10-31 PROCEDURE — 87086 URINE CULTURE/COLONY COUNT: CPT

## 2017-10-31 PROCEDURE — 2500000003 HC RX 250 WO HCPCS: Performed by: NEUROLOGICAL SURGERY

## 2017-10-31 PROCEDURE — 75898 FOLLOW-UP ANGIOGRAPHY: CPT | Performed by: PSYCHIATRY & NEUROLOGY

## 2017-10-31 PROCEDURE — 86850 RBC ANTIBODY SCREEN: CPT

## 2017-10-31 PROCEDURE — 99024 POSTOP FOLLOW-UP VISIT: CPT | Performed by: PSYCHIATRY & NEUROLOGY

## 2017-10-31 PROCEDURE — 87641 MR-STAPH DNA AMP PROBE: CPT

## 2017-10-31 PROCEDURE — 75894 X-RAYS TRANSCATH THERAPY: CPT | Performed by: PSYCHIATRY & NEUROLOGY

## 2017-10-31 PROCEDURE — 61650 EVASC PRLNG ADMN RX AGNT 1ST: CPT | Performed by: PSYCHIATRY & NEUROLOGY

## 2017-10-31 PROCEDURE — 3700000000 HC ANESTHESIA ATTENDED CARE

## 2017-10-31 PROCEDURE — 2500000003 HC RX 250 WO HCPCS: Performed by: ANESTHESIOLOGY

## 2017-10-31 PROCEDURE — 2500000003 HC RX 250 WO HCPCS: Performed by: NURSE ANESTHETIST, CERTIFIED REGISTERED

## 2017-10-31 PROCEDURE — 03LG3DZ OCCLUSION OF INTRACRANIAL ARTERY WITH INTRALUMINAL DEVICE, PERCUTANEOUS APPROACH: ICD-10-PCS | Performed by: PSYCHIATRY & NEUROLOGY

## 2017-10-31 PROCEDURE — 85347 COAGULATION TIME ACTIVATED: CPT

## 2017-10-31 PROCEDURE — 2000000003 HC NEURO ICU R&B

## 2017-10-31 PROCEDURE — 85576 BLOOD PLATELET AGGREGATION: CPT

## 2017-10-31 PROCEDURE — 85018 HEMOGLOBIN: CPT

## 2017-10-31 PROCEDURE — 6370000000 HC RX 637 (ALT 250 FOR IP): Performed by: ANESTHESIOLOGY

## 2017-10-31 PROCEDURE — 86900 BLOOD TYPING SEROLOGIC ABO: CPT

## 2017-10-31 RX ORDER — IODIXANOL 270 MG/ML
200 INJECTION, SOLUTION INTRAVASCULAR
Status: COMPLETED | OUTPATIENT
Start: 2017-10-31 | End: 2017-10-31

## 2017-10-31 RX ORDER — DIPHENHYDRAMINE HYDROCHLORIDE 50 MG/ML
12.5 INJECTION INTRAMUSCULAR; INTRAVENOUS
Status: ACTIVE | OUTPATIENT
Start: 2017-10-31 | End: 2017-10-31

## 2017-10-31 RX ORDER — FENTANYL CITRATE 50 UG/ML
25 INJECTION, SOLUTION INTRAMUSCULAR; INTRAVENOUS
Status: DISCONTINUED | OUTPATIENT
Start: 2017-10-31 | End: 2017-11-01 | Stop reason: HOSPADM

## 2017-10-31 RX ORDER — OXYCODONE HYDROCHLORIDE AND ACETAMINOPHEN 5; 325 MG/1; MG/1
1 TABLET ORAL EVERY 6 HOURS PRN
Status: DISCONTINUED | OUTPATIENT
Start: 2017-10-31 | End: 2017-11-01 | Stop reason: HOSPADM

## 2017-10-31 RX ORDER — ESMOLOL HYDROCHLORIDE 10 MG/ML
INJECTION INTRAVENOUS PRN
Status: DISCONTINUED | OUTPATIENT
Start: 2017-10-31 | End: 2017-10-31 | Stop reason: SDUPTHER

## 2017-10-31 RX ORDER — SODIUM CHLORIDE 9 MG/ML
INJECTION, SOLUTION INTRAVENOUS CONTINUOUS
Status: DISCONTINUED | OUTPATIENT
Start: 2017-10-31 | End: 2017-11-01 | Stop reason: HOSPADM

## 2017-10-31 RX ORDER — NEOSTIGMINE METHYLSULFATE 1 MG/ML
INJECTION, SOLUTION INTRAVENOUS PRN
Status: DISCONTINUED | OUTPATIENT
Start: 2017-10-31 | End: 2017-10-31 | Stop reason: SDUPTHER

## 2017-10-31 RX ORDER — HEPARIN SODIUM 1000 [USP'U]/ML
INJECTION, SOLUTION INTRAVENOUS; SUBCUTANEOUS PRN
Status: DISCONTINUED | OUTPATIENT
Start: 2017-10-31 | End: 2017-10-31 | Stop reason: SDUPTHER

## 2017-10-31 RX ORDER — FENTANYL CITRATE 50 UG/ML
25 INJECTION, SOLUTION INTRAMUSCULAR; INTRAVENOUS EVERY 5 MIN PRN
Status: DISCONTINUED | OUTPATIENT
Start: 2017-10-31 | End: 2017-11-01

## 2017-10-31 RX ORDER — SCOLOPAMINE TRANSDERMAL SYSTEM 1 MG/1
1 PATCH, EXTENDED RELEASE TRANSDERMAL
Status: DISCONTINUED | OUTPATIENT
Start: 2017-10-31 | End: 2017-11-01

## 2017-10-31 RX ORDER — ACETAMINOPHEN 325 MG/1
650 TABLET ORAL EVERY 4 HOURS PRN
Status: DISCONTINUED | OUTPATIENT
Start: 2017-10-31 | End: 2017-11-01

## 2017-10-31 RX ORDER — SODIUM CHLORIDE, SODIUM LACTATE, POTASSIUM CHLORIDE, CALCIUM CHLORIDE 600; 310; 30; 20 MG/100ML; MG/100ML; MG/100ML; MG/100ML
INJECTION, SOLUTION INTRAVENOUS CONTINUOUS
Status: DISCONTINUED | OUTPATIENT
Start: 2017-10-31 | End: 2017-11-01

## 2017-10-31 RX ORDER — PROPOFOL 10 MG/ML
INJECTION, EMULSION INTRAVENOUS PRN
Status: DISCONTINUED | OUTPATIENT
Start: 2017-10-31 | End: 2017-10-31 | Stop reason: SDUPTHER

## 2017-10-31 RX ORDER — CLINDAMYCIN PHOSPHATE 150 MG/ML
INJECTION, SOLUTION INTRAVENOUS PRN
Status: DISCONTINUED | OUTPATIENT
Start: 2017-10-31 | End: 2017-10-31 | Stop reason: SDUPTHER

## 2017-10-31 RX ORDER — ROCURONIUM BROMIDE 10 MG/ML
INJECTION, SOLUTION INTRAVENOUS PRN
Status: DISCONTINUED | OUTPATIENT
Start: 2017-10-31 | End: 2017-10-31 | Stop reason: SDUPTHER

## 2017-10-31 RX ORDER — GLYCOPYRROLATE 0.2 MG/ML
INJECTION INTRAMUSCULAR; INTRAVENOUS PRN
Status: DISCONTINUED | OUTPATIENT
Start: 2017-10-31 | End: 2017-10-31 | Stop reason: SDUPTHER

## 2017-10-31 RX ORDER — ONDANSETRON 2 MG/ML
4 INJECTION INTRAMUSCULAR; INTRAVENOUS EVERY 6 HOURS PRN
Status: DISCONTINUED | OUTPATIENT
Start: 2017-10-31 | End: 2017-11-01 | Stop reason: HOSPADM

## 2017-10-31 RX ORDER — FENTANYL CITRATE 50 UG/ML
INJECTION, SOLUTION INTRAMUSCULAR; INTRAVENOUS PRN
Status: DISCONTINUED | OUTPATIENT
Start: 2017-10-31 | End: 2017-10-31 | Stop reason: SDUPTHER

## 2017-10-31 RX ORDER — ONDANSETRON 2 MG/ML
4 INJECTION INTRAMUSCULAR; INTRAVENOUS
Status: COMPLETED | OUTPATIENT
Start: 2017-10-31 | End: 2017-10-31

## 2017-10-31 RX ORDER — LABETALOL HYDROCHLORIDE 5 MG/ML
5 INJECTION, SOLUTION INTRAVENOUS EVERY 10 MIN PRN
Status: DISCONTINUED | OUTPATIENT
Start: 2017-10-31 | End: 2017-11-01

## 2017-10-31 RX ORDER — ONDANSETRON 2 MG/ML
4 INJECTION INTRAMUSCULAR; INTRAVENOUS EVERY 10 MIN PRN
Status: COMPLETED | OUTPATIENT
Start: 2017-10-31 | End: 2017-10-31

## 2017-10-31 RX ORDER — LIDOCAINE HYDROCHLORIDE 10 MG/ML
INJECTION, SOLUTION EPIDURAL; INFILTRATION; INTRACAUDAL; PERINEURAL PRN
Status: DISCONTINUED | OUTPATIENT
Start: 2017-10-31 | End: 2017-10-31 | Stop reason: SDUPTHER

## 2017-10-31 RX ADMIN — ESMOLOL HYDROCHLORIDE 20 MG: 10 INJECTION, SOLUTION INTRAVENOUS at 12:27

## 2017-10-31 RX ADMIN — SODIUM CHLORIDE, POTASSIUM CHLORIDE, SODIUM LACTATE AND CALCIUM CHLORIDE: 600; 310; 30; 20 INJECTION, SOLUTION INTRAVENOUS at 07:25

## 2017-10-31 RX ADMIN — ESMOLOL HYDROCHLORIDE 10 MG: 10 INJECTION, SOLUTION INTRAVENOUS at 10:58

## 2017-10-31 RX ADMIN — ONDANSETRON 4 MG: 2 INJECTION, SOLUTION INTRAMUSCULAR; INTRAVENOUS at 13:43

## 2017-10-31 RX ADMIN — SODIUM CHLORIDE, POTASSIUM CHLORIDE, SODIUM LACTATE AND CALCIUM CHLORIDE: 600; 310; 30; 20 INJECTION, SOLUTION INTRAVENOUS at 10:08

## 2017-10-31 RX ADMIN — ESMOLOL HYDROCHLORIDE 10 MG: 10 INJECTION, SOLUTION INTRAVENOUS at 12:00

## 2017-10-31 RX ADMIN — GLYCOPYRROLATE 0.6 MG: 0.2 INJECTION INTRAMUSCULAR; INTRAVENOUS at 12:21

## 2017-10-31 RX ADMIN — ROCURONIUM BROMIDE 20 MG: 10 SOLUTION INTRAVENOUS at 09:35

## 2017-10-31 RX ADMIN — FAMOTIDINE 20 MG: 10 INJECTION, SOLUTION INTRAVENOUS at 14:30

## 2017-10-31 RX ADMIN — PHENYLEPHRINE HYDROCHLORIDE 50 MCG: 10 INJECTION INTRAMUSCULAR; INTRAVENOUS; SUBCUTANEOUS at 09:43

## 2017-10-31 RX ADMIN — LABETALOL HYDROCHLORIDE 5 MG: 5 INJECTION, SOLUTION INTRAVENOUS at 20:14

## 2017-10-31 RX ADMIN — HEPARIN SODIUM 5000 UNITS: 1000 INJECTION INTRAVENOUS; SUBCUTANEOUS at 09:48

## 2017-10-31 RX ADMIN — ONDANSETRON 4 MG: 2 INJECTION, SOLUTION INTRAMUSCULAR; INTRAVENOUS at 17:07

## 2017-10-31 RX ADMIN — NEOSTIGMINE METHYLSULFATE 3 MG: 1 INJECTION, SOLUTION INTRAVENOUS at 12:21

## 2017-10-31 RX ADMIN — ONDANSETRON 4 MG: 2 INJECTION, SOLUTION INTRAMUSCULAR; INTRAVENOUS at 20:12

## 2017-10-31 RX ADMIN — PHENYLEPHRINE HYDROCHLORIDE 50 MCG: 10 INJECTION INTRAMUSCULAR; INTRAVENOUS; SUBCUTANEOUS at 10:11

## 2017-10-31 RX ADMIN — HEPARIN SODIUM 1000 UNITS: 1000 INJECTION INTRAVENOUS; SUBCUTANEOUS at 11:21

## 2017-10-31 RX ADMIN — GLYCOPYRROLATE 0.2 MG: 0.2 INJECTION INTRAMUSCULAR; INTRAVENOUS at 09:30

## 2017-10-31 RX ADMIN — PROPOFOL 150 MG: 10 INJECTION, EMULSION INTRAVENOUS at 08:44

## 2017-10-31 RX ADMIN — FENTANYL CITRATE 25 MCG: 50 INJECTION INTRAMUSCULAR; INTRAVENOUS at 22:27

## 2017-10-31 RX ADMIN — ROCURONIUM BROMIDE 10 MG: 10 SOLUTION INTRAVENOUS at 10:38

## 2017-10-31 RX ADMIN — FENTANYL CITRATE 50 MCG: 50 INJECTION INTRAMUSCULAR; INTRAVENOUS at 08:44

## 2017-10-31 RX ADMIN — ESMOLOL HYDROCHLORIDE 10 MG: 10 INJECTION, SOLUTION INTRAVENOUS at 11:54

## 2017-10-31 RX ADMIN — PHENYLEPHRINE HYDROCHLORIDE 50 MCG: 10 INJECTION INTRAMUSCULAR; INTRAVENOUS; SUBCUTANEOUS at 10:03

## 2017-10-31 RX ADMIN — LIDOCAINE HYDROCHLORIDE 50 MG: 10 INJECTION, SOLUTION EPIDURAL; INFILTRATION; INTRACAUDAL; PERINEURAL at 08:44

## 2017-10-31 RX ADMIN — LABETALOL HYDROCHLORIDE 5 MG: 5 INJECTION, SOLUTION INTRAVENOUS at 12:04

## 2017-10-31 RX ADMIN — ROCURONIUM BROMIDE 50 MG: 10 SOLUTION INTRAVENOUS at 08:44

## 2017-10-31 RX ADMIN — FENTANYL CITRATE 25 MCG: 50 INJECTION INTRAMUSCULAR; INTRAVENOUS at 17:08

## 2017-10-31 RX ADMIN — HEPARIN SODIUM 2000 UNITS: 1000 INJECTION INTRAVENOUS; SUBCUTANEOUS at 10:05

## 2017-10-31 RX ADMIN — LABETALOL HYDROCHLORIDE 5 MG: 5 INJECTION, SOLUTION INTRAVENOUS at 11:02

## 2017-10-31 RX ADMIN — ESMOLOL HYDROCHLORIDE 10 MG: 10 INJECTION, SOLUTION INTRAVENOUS at 10:59

## 2017-10-31 RX ADMIN — FENTANYL CITRATE 25 MCG: 50 INJECTION INTRAMUSCULAR; INTRAVENOUS at 13:45

## 2017-10-31 RX ADMIN — CLINDAMYCIN PHOSPHATE 900 MG: 150 INJECTION, SOLUTION INTRAMUSCULAR; INTRAVENOUS at 08:51

## 2017-10-31 RX ADMIN — FENTANYL CITRATE 50 MCG: 50 INJECTION INTRAMUSCULAR; INTRAVENOUS at 08:36

## 2017-10-31 RX ADMIN — ESMOLOL HYDROCHLORIDE 10 MG: 10 INJECTION, SOLUTION INTRAVENOUS at 10:45

## 2017-10-31 RX ADMIN — ONDANSETRON 4 MG: 2 INJECTION INTRAMUSCULAR; INTRAVENOUS at 12:16

## 2017-10-31 RX ADMIN — LABETALOL HYDROCHLORIDE 5 MG: 5 INJECTION, SOLUTION INTRAVENOUS at 12:27

## 2017-10-31 RX ADMIN — ROCURONIUM BROMIDE 10 MG: 10 SOLUTION INTRAVENOUS at 11:47

## 2017-10-31 RX ADMIN — LABETALOL HYDROCHLORIDE 5 MG: 5 INJECTION, SOLUTION INTRAVENOUS at 12:09

## 2017-10-31 RX ADMIN — IODIXANOL 125 ML: 270 INJECTION, SOLUTION INTRAVASCULAR at 12:13

## 2017-10-31 RX ADMIN — PHENYLEPHRINE HYDROCHLORIDE 50 MCG: 10 INJECTION INTRAMUSCULAR; INTRAVENOUS; SUBCUTANEOUS at 09:52

## 2017-10-31 ASSESSMENT — PAIN SCALES - GENERAL
PAINLEVEL_OUTOF10: 6
PAINLEVEL_OUTOF10: 5
PAINLEVEL_OUTOF10: 4

## 2017-10-31 ASSESSMENT — PAIN - FUNCTIONAL ASSESSMENT: PAIN_FUNCTIONAL_ASSESSMENT: 0-10

## 2017-10-31 ASSESSMENT — PAIN DESCRIPTION - ORIENTATION: ORIENTATION: POSTERIOR

## 2017-10-31 ASSESSMENT — PAIN DESCRIPTION - DESCRIPTORS: DESCRIPTORS: HEADACHE

## 2017-10-31 ASSESSMENT — PAIN DESCRIPTION - PAIN TYPE: TYPE: ACUTE PAIN

## 2017-10-31 ASSESSMENT — PAIN DESCRIPTION - LOCATION: LOCATION: HEAD

## 2017-10-31 ASSESSMENT — PAIN DESCRIPTION - FREQUENCY: FREQUENCY: CONTINUOUS

## 2017-10-31 NOTE — RESEARCH
Clinical Research Services    The patient met all procedural inclusion and exclusion criteria and has been enrolled into SMART  A prospective, multicenter registry assessing the embolization of neurovascular lesions using the [address] [place] [address] Avenue. The patient's subject number is [id number]. The procedure was completed without adverse events. We will continue to follow the patient and monitor for any issues throughout the study. Please contact Garrick Gomez with any questions.     ______________________  Janice Simmons RN  Clinical Research Services  [address] [place] [address], [facility]  P: [phone]  F: [phone]

## 2017-10-31 NOTE — RESEARCH
Ariel Zacarias was asked by Dr. Israel Cruz to evaluate the patient for SMART  A prospective, multicenter registry assessing the embolization of neurovascular lesions using the  [address] [place] [address]. The patient met pre-procedure inclusion and exclusion criteria for the study. I met with the patient in SPAR to explain the study to the patient and her , Josephine Perez. I provided them with a copy of the consent for review at 07:00 AM. The patient and her  read the consent and all of their questions were answered. The patient signed the consent form at 07:33 AM. A copy of the signed consent was given to her and her , Josephine Perez.     The patient continues into screening until after the procedure today.    ______________________  Shalom Cochran RN  Clinical Research Services  [address] [place] [address], [facility]  P: [phone]  F: [phone]

## 2017-10-31 NOTE — PROGRESS NOTES
Hetal Li RN (neuro) notified of bleeding at left femoral site.  Pulses remain moderately palpable unchanged from initial assessment

## 2017-10-31 NOTE — H&P
History and Physical    Pt Name: Lizzie Begum  MRN: [id number]  YOB: 1960  Date of evaluation: 10/31/2017  Primary Care Physician: Hiram Conway MD  Patient evaluated at the request of  Dr. Amy Lechuga    Reason for evaluation:  Brain aneurysm hx   SUBJECTIVE:   History of Chief Complaint:   According to chart hx 2017     Ly Vasquez is a 56 y.o. female Lizzie Begum is a 56 y.o. female with rupture A com aneurysm 4/2017 s/p coiling. PEG and Ilene Ion has been removed and patient is almost back to herself with the exception of not driving yet. . Occasional headaches , some blurry vision                Past Medical History      has a past medical history of 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 ([facility] Arthritis; History of total bilateral knee replacement; RA (rheumatoid arthritis) ([facility] Seizure ([facility] and Wears glasses. Past Surgical History   has a past surgical history that includes joint replacement (Right, 2016); Tubal ligation (1993); craniotomy (Right, 4/16/2017); egd percutaneous placement gastrostomy tube (N/A, 5/3/2017); Tracheotomy (N/A, 5/3/2017); tracheostomy (06/2017); and joint replacement (Left, 2016).        Medications   Scheduled Meds:   Current Outpatient Rx   Medication Sig Dispense Refill    aspirin 325 MG tablet Take 325 mg by mouth daily      Omega-3 Fatty Acids (FISH OIL) 1000 MG CAPS Take 3,000 mg by mouth 3 times daily      Probiotic Product (PROBIOTIC DAILY PO) Take 1 tablet by mouth daily      clopidogrel (PLAVIX) 75 MG tablet Take 1 tablet by mouth daily 30 tablet 3    leflunomide (ARAVA) 20 MG tablet Take 1 tablet by mouth daily      calcium carbonate-vitamin D (CALTRATE) 600-400 MG-UNIT TABS per tab Take 1 tablet by mouth 2 times daily      ferrous sulfate 325 (65 FE) MG tablet Take 325 mg by mouth daily (with breakfast)      Multiple Vitamins-Minerals (THERAPEUTIC MULTIVITAMIN-MINERALS) tablet Take 1 tablet by mouth daily Continuous Infusions:   sodium chloride       PRN Meds:. Allergies  is allergic to latex; ibuprofen; pcn [penicillins]; penicillin g; morphine; and tape [adhesive tape]. Family History    family history includes Breast Cancer in her mother; COPD in her father; Cancer in her mother; Diabetes in her father and mother; Other in her mother. Family Status   Relation Status    Mother     Father Alive    Sister Alive    Maternal Grandmother     Maternal Grandfather     Paternal Grandmother     Paternal Grandfather     Sister Alive    Sister Alive    Sister Alive         Social History  Social History     Social History    Marital status:      Spouse name: N/A    Number of children: N/A    Years of education: N/A     Occupational History    Not on file. Social History Main Topics    Smoking status: Former Smoker     Quit date: 10/12/2015    Smokeless tobacco: Never Used    Alcohol use No    Drug use: No    Sexual activity: Not on file     Other Topics Concern    Not on file     Social History Narrative    No narrative on file                      OBJECTIVE:   VITALS:  height is 5' 4\" (1.626 m) and weight is 147 lb 9.6 oz (67 kg). Her oral temperature is 98.1 °F (36.7 °C). Her blood pressure is 161/86 (abnormal) and her pulse is 98. Her respiration is 16 and oxygen saturation is 97%. CONSTITUTIONAL: Alert and oriented times 3, no acute distress and cooperative to examination. friendly and pleasant     SKIN: rash No    HEENT: Head is normocephalic, atraumatic. EOMI, PERRLA    Oral air way :slightly narrow Yes    NECK: neck supple, no lymphadenopathy noted, trachea midline and straight       2+ carotid, no bruit    LUNGS: Chest expands equally bilaterally upon respiration, no accessory muscle used. Ausculation reveals no adventitious breath sounds.     CARDIOVASCULAR: \"Heart sounds are normal.  Regular rate and rhythm without

## 2017-10-31 NOTE — H&P
bilaterally. Heart exam: normal S1,S2 sounds,RRR,no murmers. Abdomen was soft, NT,ND with positive bowel sounds. Normal bilateral radial and pedal pulses. LABS:     CBC:   Lab Results   Component Value Date    WBC 5.1 10/17/2017    RBC 4.62 10/17/2017    HGB 14.5 10/17/2017    HCT 43.0 10/17/2017    MCV 93.1 10/17/2017    MCH 31.4 10/17/2017    MCHC 33.8 10/17/2017    RDW 14.5 10/17/2017     10/17/2017    MPV 7.8 10/17/2017     BMP:    Lab Results   Component Value Date     10/17/2017    K 4.0 10/17/2017     10/17/2017    CO2 24 10/17/2017    BUN 9 10/17/2017    LABALBU 3.7 04/14/2017    CREATININE 0.39 10/17/2017    CALCIUM 9.3 10/17/2017    GFRAA >60 10/17/2017    LABGLOM >60 10/17/2017    GLUCOSE 88 10/17/2017     RADIOLOGY:   Images were personally reviewed including:  Cerebral angiogram on 10/17/17    IMPRESSIONS:   Fabiano Worley is a 56 y.o. female with ACOM artery aneurysm s/p coil embolization in April 2017. On 6 months follow up angiogram ,she was found to have at least 5.9 x 2.8 x 3.6 mm recanalization with medial compaction of coil mass for which she will be admitted today for stent assisted coiling of recanalized A com aneurysm. PLANS:     Stent assisted coiling of recanalized A com aneurysm.     Lizeth Caba MD  Pager [phone]  Stroke, [facility]  [address]  Electronically signed 10/31/2017 at 7:53 AM

## 2017-10-31 NOTE — PROCEDURES
[facility]    NEUROENDOVASCULAR SERVICE: POST-OP NOTE: 10/31/2017    Pt Name: Georges Youssef  MRN: [id number]  YOB: 1960  Date of Procedure: 10/31/2017  Primary Care Physician: Earlene Kim MD    Pre-Procedural Diagnosis:Recanalized widenecked A com aneurysm  Post-Procedural Diagnosis:same    Procedure Performed:recanalized A com aneurysm s/p stent assisted coiling     Surgeon:   Nayla Vang MD    1st Assistant:  Anitha Calle    Fellow:  Eulogio Matos    PRE-PROCEDURAL EXAM:  MODIFIED KALYN SCORE: 2 - Slight disability:  unable to carry out all previous activities, but able to look after own affairs without assistance. Neurological exam performed and unchanged from initial H&P or consult    Anesthesia: General Anesthesia  Complications: none    Intra-Operative EXAM:  Patient sedated with unchanged limited neurological exam    EBL: < Minimal      Cc            Specimens: Were not Obtained  Contrast:     Visipaque 270 low osmolar 125 Cc             Fluoro: 55.7 min    Findings:  Please see dictated Radiology note for further details  1. Previously coiled A com aneurysm s/p Y shape stent assisted coiling with 2 neuroform stents( 3.5 mm x 20 mm, 3 mm x 20 mm) and 11 smart coils. Aquilino score: class I    POST-PROCEDURAL EXAM :   Stable neurological Exam  Neurological exam performed and unchanged from initial H&P or consult    Closure:  left StarClose 6   F        POST-PROCEDURAL MONITORING : see orders  Disposition: Neuro ICU      Recommendations:  1. Admit to NSICU. 2. Do not bend left leg for 3 hours. 3. Groin checks per protocol. 4. Neuro checks per icu protocol. 5. Peripheral pulse checks per protocol.   6. ICU management per NSICU team.   7. SBP goal   8.  aspirin 325mg and clopidogrel 75 mg daily    Yolanda Shah MD  Stroke, [facility] [address] [facility]  Electronically signed 10/31/2017 at 12:30 PM

## 2017-10-31 NOTE — ANESTHESIA POSTPROCEDURE EVALUATION
Department of Anesthesiology  Postprocedure Note    Patient: Conhca Hankins  MRN: [id number]  YOB: 1960  Date of evaluation: 10/31/2017  Time:  2:25 PM     Procedure Summary     Date:  10/31/17 Room / Location:  [facility] Special Procedures    Anesthesia Start:  0216 Anesthesia Stop:  5343    Procedure:  IR ANGIOGRAM CAROTID CEREBRAL BILATERAL Diagnosis:  (diagnostic)    Scheduled Providers:  Toro Shen CRNA Responsible Provider:  Jordan Irving MD    Anesthesia Type:  general ASA Status:  4          Anesthesia Type: general    Ron Phase I:      Ron Phase II:      Last vitals: Reviewed and per EMR flowsheets.        Anesthesia Post Evaluation    Patient location during evaluation: PACU  Patient participation: complete - patient participated  Level of consciousness: awake and alert  Pain score: 0  Nausea & Vomiting: nausea  Cardiovascular status: hemodynamically stable  Respiratory status: room air  Hydration status: euvolemic

## 2017-11-01 ENCOUNTER — APPOINTMENT (OUTPATIENT)
Dept: GENERAL RADIOLOGY | Age: 57
DRG: 024 | End: 2017-11-01
Attending: PSYCHIATRY & NEUROLOGY
Payer: MEDICARE

## 2017-11-01 VITALS
RESPIRATION RATE: 16 BRPM | HEIGHT: 64 IN | WEIGHT: 147.6 LBS | TEMPERATURE: 98.8 F | OXYGEN SATURATION: 93 % | DIASTOLIC BLOOD PRESSURE: 72 MMHG | BODY MASS INDEX: 25.2 KG/M2 | SYSTOLIC BLOOD PRESSURE: 131 MMHG | HEART RATE: 92 BPM

## 2017-11-01 LAB
ABSOLUTE EOS #: 0.06 K/UL (ref 0–0.44)
ABSOLUTE IMMATURE GRANULOCYTE: <0.03 K/UL (ref 0–0.3)
ABSOLUTE LYMPH #: 1.49 K/UL (ref 1.1–3.7)
ABSOLUTE MONO #: 0.54 K/UL (ref 0.1–1.2)
ANION GAP SERPL CALCULATED.3IONS-SCNC: 13 MMOL/L (ref 9–17)
BASOPHILS # BLD: 1 % (ref 0–2)
BASOPHILS ABSOLUTE: 0.05 K/UL (ref 0–0.2)
BUN BLDV-MCNC: 6 MG/DL (ref 6–20)
BUN/CREAT BLD: ABNORMAL (ref 9–20)
CALCIUM SERPL-MCNC: 8.6 MG/DL (ref 8.6–10.4)
CHLORIDE BLD-SCNC: 110 MMOL/L (ref 98–107)
CO2: 23 MMOL/L (ref 20–31)
CREAT SERPL-MCNC: 0.38 MG/DL (ref 0.5–0.9)
CULTURE: NORMAL
CULTURE: NORMAL
DIFFERENTIAL TYPE: ABNORMAL
EOSINOPHILS RELATIVE PERCENT: 1 % (ref 1–4)
GFR AFRICAN AMERICAN: >60 ML/MIN
GFR NON-AFRICAN AMERICAN: >60 ML/MIN
GFR SERPL CREATININE-BSD FRML MDRD: ABNORMAL ML/MIN/{1.73_M2}
GFR SERPL CREATININE-BSD FRML MDRD: ABNORMAL ML/MIN/{1.73_M2}
GLUCOSE BLD-MCNC: 88 MG/DL (ref 70–99)
HCT VFR BLD CALC: 37.2 % (ref 36.3–47.1)
HEMOGLOBIN: 11.8 G/DL (ref 11.9–15.1)
IMMATURE GRANULOCYTES: 0 %
LYMPHOCYTES # BLD: 24 % (ref 24–43)
Lab: NORMAL
MCH RBC QN AUTO: 31.6 PG (ref 25.2–33.5)
MCHC RBC AUTO-ENTMCNC: 31.7 G/DL (ref 29.9–34.7)
MCV RBC AUTO: 99.5 FL (ref 82.6–102.9)
MONOCYTES # BLD: 9 % (ref 3–12)
MRSA, DNA, NASAL: NORMAL
PDW BLD-RTO: 13.7 % (ref 11.8–14.4)
PLATELET # BLD: 239 K/UL (ref 138–453)
PLATELET ESTIMATE: ABNORMAL
PMV BLD AUTO: 9.9 FL (ref 8.1–13.5)
POTASSIUM SERPL-SCNC: 3.9 MMOL/L (ref 3.7–5.3)
RBC # BLD: 3.74 M/UL (ref 3.95–5.11)
RBC # BLD: ABNORMAL 10*6/UL
SEG NEUTROPHILS: 65 % (ref 36–65)
SEGMENTED NEUTROPHILS ABSOLUTE COUNT: 4.08 K/UL (ref 1.5–8.1)
SODIUM BLD-SCNC: 146 MMOL/L (ref 135–144)
SPECIMEN DESCRIPTION: NORMAL
SPECIMEN DESCRIPTION: NORMAL
STATUS: NORMAL
WBC # BLD: 6.2 K/UL (ref 3.5–11.3)
WBC # BLD: ABNORMAL 10*3/UL

## 2017-11-01 PROCEDURE — 6370000000 HC RX 637 (ALT 250 FOR IP): Performed by: EMERGENCY MEDICINE

## 2017-11-01 PROCEDURE — 6360000002 HC RX W HCPCS: Performed by: EMERGENCY MEDICINE

## 2017-11-01 PROCEDURE — 99238 HOSP IP/OBS DSCHRG MGMT 30/<: CPT | Performed by: PSYCHIATRY & NEUROLOGY

## 2017-11-01 PROCEDURE — 74000 XR ABDOMEN LIMITED (KUB): CPT

## 2017-11-01 PROCEDURE — S0028 INJECTION, FAMOTIDINE, 20 MG: HCPCS | Performed by: PHYSICIAN ASSISTANT

## 2017-11-01 PROCEDURE — 85025 COMPLETE CBC W/AUTO DIFF WBC: CPT

## 2017-11-01 PROCEDURE — 80048 BASIC METABOLIC PNL TOTAL CA: CPT

## 2017-11-01 PROCEDURE — 2500000003 HC RX 250 WO HCPCS: Performed by: PHYSICIAN ASSISTANT

## 2017-11-01 RX ORDER — ASPIRIN 81 MG/1
324 TABLET, CHEWABLE ORAL DAILY
Status: DISCONTINUED | OUTPATIENT
Start: 2017-11-01 | End: 2017-11-01 | Stop reason: HOSPADM

## 2017-11-01 RX ORDER — PROMETHAZINE HYDROCHLORIDE 25 MG/ML
12.5 INJECTION, SOLUTION INTRAMUSCULAR; INTRAVENOUS EVERY 6 HOURS PRN
Status: DISCONTINUED | OUTPATIENT
Start: 2017-11-01 | End: 2017-11-01 | Stop reason: HOSPADM

## 2017-11-01 RX ORDER — ACETAMINOPHEN 325 MG/1
650 TABLET ORAL EVERY 6 HOURS PRN
Status: DISCONTINUED | OUTPATIENT
Start: 2017-11-01 | End: 2017-11-01 | Stop reason: HOSPADM

## 2017-11-01 RX ORDER — CLOPIDOGREL BISULFATE 75 MG/1
75 TABLET ORAL DAILY
Status: DISCONTINUED | OUTPATIENT
Start: 2017-11-01 | End: 2017-11-01 | Stop reason: HOSPADM

## 2017-11-01 RX ORDER — ASPIRIN 81 MG/1
324 TABLET, CHEWABLE ORAL ONCE
Status: DISCONTINUED | OUTPATIENT
Start: 2017-11-01 | End: 2017-11-01

## 2017-11-01 RX ORDER — FAMOTIDINE 20 MG/1
20 TABLET, FILM COATED ORAL 2 TIMES DAILY
Qty: 60 TABLET | Refills: 3 | Status: SHIPPED | OUTPATIENT
Start: 2017-11-01 | End: 2019-06-07 | Stop reason: ALTCHOICE

## 2017-11-01 RX ADMIN — FAMOTIDINE 20 MG: 10 INJECTION, SOLUTION INTRAVENOUS at 08:20

## 2017-11-01 RX ADMIN — CLOPIDOGREL 75 MG: 75 TABLET, FILM COATED ORAL at 10:17

## 2017-11-01 RX ADMIN — ASPIRIN 324 MG: 81 TABLET, CHEWABLE ORAL at 10:17

## 2017-11-01 RX ADMIN — ONDANSETRON 4 MG: 2 INJECTION, SOLUTION INTRAMUSCULAR; INTRAVENOUS at 10:17

## 2017-11-01 RX ADMIN — PROMETHAZINE HYDROCHLORIDE 12.5 MG: 25 INJECTION INTRAMUSCULAR; INTRAVENOUS at 04:58

## 2017-11-01 RX ADMIN — FENTANYL CITRATE 25 MCG: 50 INJECTION INTRAMUSCULAR; INTRAVENOUS at 00:36

## 2017-11-01 ASSESSMENT — PAIN SCALES - GENERAL
PAINLEVEL_OUTOF10: 3
PAINLEVEL_OUTOF10: 7

## 2017-11-01 ASSESSMENT — PAIN DESCRIPTION - LOCATION: LOCATION: ABDOMEN

## 2017-11-01 ASSESSMENT — PAIN DESCRIPTION - PAIN TYPE: TYPE: ACUTE PAIN

## 2017-11-01 NOTE — DISCHARGE SUMMARY
ENDOVASCULAR NEUROSURGERY DISCHARGE NOTE  11/1/2017 1:20 PM  Subjective:   Admit Date: 10/31/2017  PCP: Jose Roberto Warren MD    55 yo woman with rupturedACOM artery aneurysm s/p coil embolization in April 2017. On 6 months follow up angiogram ,she was found to have at least 5.9 x 2.8 x 3.6 mm recanalization with medial compaction of coil mass for which she will be admitted today for stent assisted coiling of recanalized A com aneurysm. 10- successful Y stent ( neuroform) assisted coil embolization with smart coils. Mild headache improved the next day and gi upset overnight. Monitored in ICU for close bp control and neuro checks. Doing better in afternoon with plan for discharge once ambulating    Objective:   Vitals: /72   Pulse 92   Temp 98.8 °F (37.1 °C) (Oral)   Resp 16   Ht 5' 4\" (1.626 m)   Wt 147 lb 9.6 oz (67 kg)   LMP 10/30/2016   SpO2 93%   BMI 25.34 kg/m²   General appearance: Lying in bed, NAD, doing well  HEENT: Head: Normocephalic, no lesions, without obvious abnormality.   Neck: no adenopathy, no carotid bruit, no JVD, supple, symmetrical, trachea midline and thyroid not enlarged, symmetric, no tenderness/mass/nodules  Lungs: clear to auscultation bilaterally  Heart: regular rate and rhythm, S1, S2 normal, no murmur, click, rub or gallop  Abdomen: soft, non-tender; nondistended, bowel sounds normal  Extremities: extremities normal, atraumatic, no cyanosis or edema  Groin: groin c/d/i  Neurologic: Mental status: Alert and oriented x 3, intact language, attention, knowledge  CN: eomi, perrl, v1-v3 intact no facial asymmetry, midline tongue nl shoulder shrug   MOTOR: 5/5/ bue/ble  SENSORY: intact lt  COORDINATION: no dysmetria    Medications and labs:   Scheduled Meds:   clopidogrel  75 mg Oral Daily    aspirin  324 mg Oral Daily    famotidine (PEPCID) injection  20 mg Intravenous BID     Continuous Infusions:   sodium chloride       CBC:   Recent Labs      10/31/17 Sho  [address] [place] [address], [address] [place] [address]    Phone:  [phone]   · famotidine 20 MG tablet         Diet: general diet    Activity:no heavy lifting more than 10 lbs x 1 week    DISPO: Home    Arnold Melendez MD  Stroke, [facility]  [address]  Electronically signed 11/1/2017 at 1:20 PM

## 2017-11-01 NOTE — PROGRESS NOTES
Patient with small green, liquid emesis. Zofran given. Patient continually nauseous per day shift RN. Informed Dr Nicole Cook of nausea and headache. Order for fentanyl updated due to order being a PACU order.

## 2017-11-01 NOTE — PROGRESS NOTES
Intensive Care Unit  Neuro Intensive Care Unit  Attending Progress Note          SUBJECTIVE:  No significant events overnight    OBJECTIVE:    Current Medications:  Current Facility-Administered Medications: promethazine (PHENERGAN) injection 12.5 mg, 12.5 mg, Intravenous, Q6H PRN  clopidogrel (PLAVIX) tablet 75 mg, 75 mg, Oral, Daily  aspirin chewable tablet 324 mg, 324 mg, Oral, Daily  acetaminophen (TYLENOL) tablet 650 mg, 650 mg, Oral, Q6H PRN  famotidine (PEPCID) injection 20 mg, 20 mg, Intravenous, BID  0.9 % sodium chloride infusion, , Intravenous, Continuous  oxyCODONE-acetaminophen (PERCOCET) 5-325 MG per tablet 1 tablet, 1 tablet, Oral, Q6H PRN  ondansetron (ZOFRAN) injection 4 mg, 4 mg, Intravenous, Q6H PRN  fentaNYL (SUBLIMAZE) injection 25 mcg, 25 mcg, Intravenous, Q1H PRN    Physical:   VITALS:  /72   Pulse 92   Temp 98.8 °F (37.1 °C) (Oral)   Resp 16   Ht 5' 4\" (1.626 m)   Wt 147 lb 9.6 oz (67 kg)   LMP 10/30/2016   SpO2 93%   BMI 25.34 kg/m²   NEUROLOGIC:  Awake, alert, oriented to name, place and time. Cranial nerves II-XII are grossly intact. Slight RUE pronator drift. Reduced sensation in the right arm and leg. Walks unassisted.     Data:  CBC:   Lab Results   Component Value Date    WBC 6.2 11/01/2017    RBC 3.74 11/01/2017    HGB 11.8 11/01/2017    HCT 37.2 11/01/2017    MCV 99.5 11/01/2017    RDW 13.7 11/01/2017     11/01/2017     CMP:    Lab Results   Component Value Date     11/01/2017    K 3.9 11/01/2017     11/01/2017    CO2 23 11/01/2017    BUN 6 11/01/2017    CREATININE 0.38 11/01/2017    GFRAA >60 11/01/2017    LABGLOM >60 11/01/2017    GLUCOSE 88 11/01/2017    PROT 6.4 04/14/2017    CALCIUM 8.6 11/01/2017    BILITOT 0.66 04/14/2017    ALKPHOS 87 04/14/2017    AST 44 04/14/2017    ALT 29 04/14/2017       ICU guidelines/prophylaxis active for the following:    head above bed above 30 degrees    ASSESSMENT & PLAN:  55 yo woman admitted for retreatment of recanalized ACOM artery aneurysm, 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 -->aspirin and plavix daily.  -->plan for dc home today.  -->follow up to be arranged with Dr. Jasper De Jesus.  -->given abdominal complaints will obtain an abdominal KUB before dc.

## 2017-11-01 NOTE — PROGRESS NOTES
Patient stated phenergan only made her tired. She states it feels like her stomach is doing \"flip flops\". Relayed  This information to Claus Mauro and [facility] with Neuro. Received new order for pepcid. Requested from pharmacy.

## 2017-11-02 NOTE — RESEARCH
Clinical Research Services    Subject # [id number]    The patient was met in Neuro ICU. She was resting in bed. Writer spoke with patient about how she has been feeling since the procedure. She denies any major adverse events from the procedure at this time, although she has been a bit nauseous due to her aspirin. The patient was encouraged to call Garrick Gomez with any questions or concerns that may arise. We will continue to follow the patient and monitor for any issues throughout the study.     ______________________  Andrae Del Toro RN  Clinical Research Services  [address] [place] [address], [address]  P: [phone]  F: [phone]

## 2017-11-14 ENCOUNTER — TELEPHONE (OUTPATIENT)
Dept: NEUROLOGY | Age: 57
End: 2017-11-14

## 2017-11-14 NOTE — TELEPHONE ENCOUNTER
Patient with Y stent, stent assisted coil embolization of aneurysm.  We had briefly discussed prior to procedure that she will need to be on aspirin and plavix usually for 6 months but if needed 4 months at the minimum    Dharmesh Marquez MD

## 2017-11-14 NOTE — TELEPHONE ENCOUNTER
Patient called this morning stating she needs to schedule a root canal and is unsure if there will be an issue with the Plavix that she recently started taking. Patient has a f/u appt on 11.30.17 with Dr. Rogelio Mccarthy. Please advise.

## 2017-11-30 ENCOUNTER — OFFICE VISIT (OUTPATIENT)
Dept: NEUROLOGY | Age: 57
End: 2017-11-30

## 2017-11-30 VITALS
SYSTOLIC BLOOD PRESSURE: 167 MMHG | HEART RATE: 108 BPM | DIASTOLIC BLOOD PRESSURE: 105 MMHG | BODY MASS INDEX: 24.75 KG/M2 | HEIGHT: 64 IN | WEIGHT: 145 LBS

## 2017-11-30 DIAGNOSIS — Z95.828 MRI-SAFE ENDOVASCULAR ANEURYSM COIL PRESENT: ICD-10-CM

## 2017-11-30 DIAGNOSIS — R53.1 WEAKNESS: Primary | ICD-10-CM

## 2017-11-30 DIAGNOSIS — I60.2: ICD-10-CM

## 2017-11-30 PROCEDURE — 99024 POSTOP FOLLOW-UP VISIT: CPT | Performed by: PSYCHIATRY & NEUROLOGY

## 2017-11-30 RX ORDER — BUTALBITAL, ACETAMINOPHEN AND CAFFEINE 50; 325; 40 MG/1; MG/1; MG/1
1 TABLET ORAL EVERY 4 HOURS PRN
COMMUNITY

## 2017-11-30 ASSESSMENT — ENCOUNTER SYMPTOMS
VOICE CHANGE: 0
NAUSEA: 0
PHOTOPHOBIA: 0
VOMITING: 0
COLOR CHANGE: 0
SHORTNESS OF BREATH: 0
COUGH: 0

## 2017-12-01 ENCOUNTER — TELEPHONE (OUTPATIENT)
Dept: NEUROLOGY | Age: 57
End: 2017-12-01

## 2017-12-01 NOTE — TELEPHONE ENCOUNTER
ABIODUN Ruelas to call me with the location in which she wants her Physical Therapy referral to go to

## 2017-12-22 ENCOUNTER — TELEPHONE (OUTPATIENT)
Dept: NEUROLOGY | Age: 57
End: 2017-12-22

## 2017-12-22 NOTE — TELEPHONE ENCOUNTER
As it is still within the 4 months from the procedure, She will need to start the aspirin and  clopidogrel as soon as she can after the root canal    Thank you,  Janett Vides

## 2017-12-22 NOTE — TELEPHONE ENCOUNTER
Radha Nolasco called wanting to inform you that she is to have a root canal on 1/2/18. She is stopping her Plavix on 1/1/18 for the procedure. She wanted to know if she could stay off or if she needed to start right back up after the root canal?  Please advise.

## 2018-02-07 DIAGNOSIS — I63.9 CEREBROVASCULAR ACCIDENT (CVA), UNSPECIFIED MECHANISM (HCC): ICD-10-CM

## 2018-02-07 RX ORDER — CLOPIDOGREL BISULFATE 75 MG/1
TABLET ORAL
Qty: 30 TABLET | Refills: 2 | Status: SHIPPED | OUTPATIENT
Start: 2018-02-07 | End: 2018-04-05 | Stop reason: CLARIF

## 2018-02-08 ENCOUNTER — TELEPHONE (OUTPATIENT)
Dept: NEUROLOGY | Age: 58
End: 2018-02-08

## 2018-02-08 NOTE — TELEPHONE ENCOUNTER
Discussed with patient, she has had vision concerns since ruptured aneurysm.   Recommend referral from her PCP   Dr. Estee Celestin if she has any concerns we can assist with the referral

## 2018-02-08 NOTE — TELEPHONE ENCOUNTER
Lashon Sorto called wanting to get a [facility].   She asked that she be called at [phone] to discuss this request.

## 2018-04-05 ENCOUNTER — OFFICE VISIT (OUTPATIENT)
Dept: NEUROLOGY | Age: 58
End: 2018-04-05
Payer: MEDICARE

## 2018-04-05 VITALS
HEART RATE: 61 BPM | WEIGHT: 145 LBS | HEIGHT: 64 IN | DIASTOLIC BLOOD PRESSURE: 78 MMHG | BODY MASS INDEX: 24.75 KG/M2 | SYSTOLIC BLOOD PRESSURE: 141 MMHG

## 2018-04-05 DIAGNOSIS — I67.1 CEREBRAL ANEURYSM: ICD-10-CM

## 2018-04-05 DIAGNOSIS — I60.2: Primary | ICD-10-CM

## 2018-04-05 PROCEDURE — 3014F SCREEN MAMMO DOC REV: CPT | Performed by: PSYCHIATRY & NEUROLOGY

## 2018-04-05 PROCEDURE — 1036F TOBACCO NON-USER: CPT | Performed by: PSYCHIATRY & NEUROLOGY

## 2018-04-05 PROCEDURE — G8598 ASA/ANTIPLAT THER USED: HCPCS | Performed by: PSYCHIATRY & NEUROLOGY

## 2018-04-05 PROCEDURE — G8427 DOCREV CUR MEDS BY ELIG CLIN: HCPCS | Performed by: PSYCHIATRY & NEUROLOGY

## 2018-04-05 PROCEDURE — G8420 CALC BMI NORM PARAMETERS: HCPCS | Performed by: PSYCHIATRY & NEUROLOGY

## 2018-04-05 PROCEDURE — 99214 OFFICE O/P EST MOD 30 MIN: CPT | Performed by: PSYCHIATRY & NEUROLOGY

## 2018-04-05 PROCEDURE — 3017F COLORECTAL CA SCREEN DOC REV: CPT | Performed by: PSYCHIATRY & NEUROLOGY

## 2018-04-05 RX ORDER — LISINOPRIL 10 MG/1
10 TABLET ORAL DAILY
COMMUNITY
End: 2022-04-06

## 2018-04-05 ASSESSMENT — ENCOUNTER SYMPTOMS
ALLERGIC/IMMUNOLOGIC NEGATIVE: 1
EYES NEGATIVE: 1
RESPIRATORY NEGATIVE: 1

## 2018-04-26 NOTE — ANESTHESIA PRE PROCEDURE
Department of Anesthesiology  Preprocedure Note       Name:  Saintclair Ruffini   Age:  56 y.o.  :  1960                                          MRN:  [id number]         Date:  10/31/2017      Surgeon: * No surgeons listed *    Procedure: * No procedures listed *    Department of Anesthesiology  Pre-Anesthesia Evaluation/Consultation         Name:  Saintclair Ruffini                                         Age:  56 y.o.   MRN:  [id number]             Medications  Current Outpatient Prescriptions   Medication Sig Dispense Refill    aspirin 325 MG tablet Take 325 mg by mouth daily      Omega-3 Fatty Acids (FISH OIL) 1000 MG CAPS Take 3,000 mg by mouth 3 times daily      Probiotic Product (PROBIOTIC DAILY PO) Take 1 tablet by mouth daily      clopidogrel (PLAVIX) 75 MG tablet Take 1 tablet by mouth daily 30 tablet 3    leflunomide (ARAVA) 20 MG tablet Take 1 tablet by mouth daily      calcium carbonate-vitamin D (CALTRATE) 600-400 MG-UNIT TABS per tab Take 1 tablet by mouth 2 times daily      ferrous sulfate 325 (65 FE) MG tablet Take 325 mg by mouth daily (with breakfast)      Multiple Vitamins-Minerals (THERAPEUTIC MULTIVITAMIN-MINERALS) tablet Take 1 tablet by mouth daily       Current Facility-Administered Medications   Medication Dose Route Frequency Provider Last Rate Last Dose    0.9 % sodium chloride infusion   Intravenous Continuous Ya Weber MD           Allergies   Allergen Reactions    Latex Itching    Ibuprofen Nausea Only    Pcn [Penicillins] Anaphylaxis    Penicillin G Anaphylaxis    Morphine Hives    Tape [Adhesive Tape] Rash     Patient Active Problem List   Diagnosis    Subarachnoid hemorrhage from left anterior communicating artery (HCC)    Acute respiratory failure without hypercapnia (HCC)    MRI-safe endovascular aneurysm coil present    Seizure (HCC)    Encephalopathy acute    Cerebral vasospasm    Coma (HCC)    Intraventricular hemorrhage (HCC)    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 (HCC)    Fever    Pneumonia of left lower lobe due to infectious organism ([facility]    Aneurysm of anterior communicating artery     Past Medical History:   Diagnosis Date    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 ([facility] 2017    QUESTIONABLE RUPTURED ANEURSYM-COMA X 1 MONTH'    Arthritis     History of total bilateral knee replacement     RA (rheumatoid arthritis) ([facility]     Seizure ([facility] 2017    NONE SINCE    Wears glasses     READING     Past Surgical History:   Procedure Laterality Date    CRANIOTOMY Right 2017    RIGHT FRONTAL EXTERNAL VENTRICULAR DRAIN PLACEMENT performed by Lorraine Schmidt MD at [address] Right 2016    KNEE    JOINT REPLACEMENT Left 2016    KNEE    [place] EGD PERCUTANEOUS PLACEMENT GASTROSTOMY TUBE N/A 5/3/2017    EGD ESOPHAGOGASTRODUODENOSCOPY PEG TUBE INSERTION- GI UNIT SCHEDULED performed by Ran Reilly MD at [address] [address] APPROX 2017    TRACHEOSTOMY  2017    REMOVED     TRACHEOTOMY N/A 5/3/2017    TRACHEOTOMY performed by Ran Reilly MD at [facility] History   Substance Use Topics    Smoking status: Former Smoker     Quit date: 10/12/2015    Smokeless tobacco: Never Used    Alcohol use No         Vital Signs (Current)   Vitals:    10/31/17 0639   BP: (!) 161/86   Pulse: 98   Resp: 16   Temp: 98.1 °F (36.7 °C)   SpO2: 97%     Vital Signs Statistics (for past 48 hrs)     Temp  Av.1 °F (36.7 °C)  Min: 98.1 °F (36.7 °C)   Min taken time: 10/31/17 0639  Max: 98.1 °F (36.7 °C)   Max taken time: 10/31/17 0639  Pulse  Av  Min: 98   Min taken time: 10/31/17 0639  Max: 98   Max taken time: 10/31/17 0639  Resp  Av  Min: 16   Min taken time: 10/31/17 0639  Max: 16   Max taken time: 10/31/17 0639  BP  Min: 161/86   Min taken time: 10/31/17 0639  Max: 161/86   Max taken time: 10/31/17 0639  SpO2  Av %  Min: 97 %   Min taken time: OIL) 1000 MG CAPS Take 3,000 mg by mouth 3 times daily   Yes Historical Provider, MD   Probiotic Product (PROBIOTIC DAILY PO) Take 1 tablet by mouth daily   Yes Historical Provider, MD   clopidogrel (PLAVIX) 75 MG tablet Take 1 tablet by mouth daily 10/24/17  Yes Colton Davidson MD   leflunomide (ARAVA) 20 MG tablet Take 1 tablet by mouth daily 10/10/17  Yes Historical Provider, MD   calcium carbonate-vitamin D (CALTRATE) 600-400 MG-UNIT TABS per tab Take 1 tablet by mouth 2 times daily   Yes Historical Provider, MD   ferrous sulfate 325 (65 FE) MG tablet Take 325 mg by mouth daily (with breakfast)   Yes Historical Provider, MD   Multiple Vitamins-Minerals (THERAPEUTIC MULTIVITAMIN-MINERALS) tablet Take 1 tablet by mouth daily   Yes Historical Provider, MD       Current medications:    Current Outpatient Prescriptions   Medication Sig Dispense Refill    aspirin 325 MG tablet Take 325 mg by mouth daily      Omega-3 Fatty Acids (FISH OIL) 1000 MG CAPS Take 3,000 mg by mouth 3 times daily      Probiotic Product (PROBIOTIC DAILY PO) Take 1 tablet by mouth daily      clopidogrel (PLAVIX) 75 MG tablet Take 1 tablet by mouth daily 30 tablet 3    leflunomide (ARAVA) 20 MG tablet Take 1 tablet by mouth daily      calcium carbonate-vitamin D (CALTRATE) 600-400 MG-UNIT TABS per tab Take 1 tablet by mouth 2 times daily      ferrous sulfate 325 (65 FE) MG tablet Take 325 mg by mouth daily (with breakfast)      Multiple Vitamins-Minerals (THERAPEUTIC MULTIVITAMIN-MINERALS) tablet Take 1 tablet by mouth daily       Current Facility-Administered Medications   Medication Dose Route Frequency Provider Last Rate Last Dose    0.9 % sodium chloride infusion   Intravenous Continuous Colton Davidson MD           Allergies:     Allergies   Allergen Reactions    Latex Itching    Ibuprofen Nausea Only    Pcn [Penicillins] Anaphylaxis    Penicillin G Anaphylaxis    Morphine Hives    Tape Sandralee Hensen Tape] Rash       Problem List: Patient Active Problem List   Diagnosis Code    Subarachnoid hemorrhage from left anterior communicating artery (HCC) I60.2    Acute respiratory failure without hypercapnia ([address] J96.00    MRI-safe endovascular aneurysm coil present Z98.890    Seizure ([address] R56.9    Encephalopathy acute G93.40    Cerebral vasospasm I67.848    Coma ([address] R40.20    Intraventricular hemorrhage ([address] I61.5    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 (HCC) I63.512    Fever R50.9    Pneumonia of left lower lobe due to infectious organism ([address] J18.1    Aneurysm of anterior communicating artery I67.1       Past Medical History:        Diagnosis Date    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 ([address] 04/14/2017    QUESTIONABLE RUPTURED ANEURSYM-COMA X 1 MONTH'    Arthritis     History of total bilateral knee replacement     RA (rheumatoid arthritis) ([address]     Seizure ([address] 04/2017    NONE SINCE    Wears glasses     READING       Past Surgical History:        Procedure Laterality Date    CRANIOTOMY Right 4/16/2017    RIGHT FRONTAL EXTERNAL VENTRICULAR DRAIN PLACEMENT performed by Dontae Ross MD at [address] Right 2016    KNEE    JOINT REPLACEMENT Left 2016    KNEE    [place] EGD PERCUTANEOUS PLACEMENT GASTROSTOMY TUBE N/A 5/3/2017    EGD ESOPHAGOGASTRODUODENOSCOPY PEG TUBE INSERTION- GI UNIT SCHEDULED performed by Tyree Singh MD at [address] [address] 07/2017    TRACHEOSTOMY  06/2017    REMOVED     TRACHEOTOMY N/A 5/3/2017    TRACHEOTOMY performed by Tyree Singh MD at [address] [place] [address]       Social History:    Social History   Substance Use Topics    Smoking status: Former Smoker     Quit date: 10/12/2015    Smokeless tobacco: Never Used    Alcohol use No                                Counseling given: Not Answered      Vital Signs (Current):   Vitals:    10/30/17 1126 10/31/17 0639   BP:  (!) 161/86   Pulse:  98   Resp:  16 recent URI                        ROS comment: Hx of trach   Cardiovascular:                   ROS comment: -cp,syn,pnd,palp     Neuro/Psych:   (+) seizures:, CVA:,                Comments: SAH, CVA GI/Hepatic/Renal:        (-) GERD       Endo/Other:    (+) : arthritis:. (-) no Type II DM               Abdominal:           Vascular:                                      Anesthesia Plan      general     ASA 4     (ASA 4 cva)  Induction: intravenous.   arterial line                        Juan Antonio Jolly MD   10/31/2017 71

## 2018-05-07 ENCOUNTER — HOSPITAL ENCOUNTER (OUTPATIENT)
Dept: INTERVENTIONAL RADIOLOGY/VASCULAR | Age: 58
Discharge: HOME | End: 2018-05-09
Payer: MEDICARE

## 2018-05-07 VITALS
SYSTOLIC BLOOD PRESSURE: 102 MMHG | DIASTOLIC BLOOD PRESSURE: 56 MMHG | TEMPERATURE: 97.8 F | HEIGHT: 64 IN | HEART RATE: 92 BPM | RESPIRATION RATE: 14 BRPM | WEIGHT: 145 LBS | OXYGEN SATURATION: 99 % | BODY MASS INDEX: 24.75 KG/M2

## 2018-05-07 LAB
BUN BLDV-MCNC: 10 MG/DL (ref 6–20)
CREAT SERPL-MCNC: 0.47 MG/DL (ref 0.5–0.9)
GFR AFRICAN AMERICAN: >60 ML/MIN
GFR NON-AFRICAN AMERICAN: >60 ML/MIN
GFR NON-AFRICAN AMERICAN: NORMAL ML/MIN
GFR SERPL CREATININE-BSD FRML MDRD: ABNORMAL ML/MIN/{1.73_M2}
GFR SERPL CREATININE-BSD FRML MDRD: ABNORMAL ML/MIN/{1.73_M2}
GFR SERPL CREATININE-BSD FRML MDRD: NORMAL ML/MIN
GFR SERPL CREATININE-BSD FRML MDRD: NORMAL ML/MIN/{1.73_M2}
GLUCOSE BLD-MCNC: 91 MG/DL (ref 74–100)
PLATELET # BLD: 256 K/UL (ref 138–453)
POC CHLORIDE: 111 MMOL/L (ref 98–107)
POC CREATININE: NORMAL MG/DL (ref 0.51–1.19)
POC HEMATOCRIT: 45 % (ref 36–46)
POC HEMOGLOBIN: 15.3 G/DL (ref 12–16)
POC POTASSIUM: 5.6 MMOL/L (ref 3.5–4.5)
POC SODIUM: 143 MMOL/L (ref 138–146)
POTASSIUM SERPL-SCNC: 4 MMOL/L (ref 3.7–5.3)

## 2018-05-07 PROCEDURE — C1894 INTRO/SHEATH, NON-LASER: HCPCS

## 2018-05-07 PROCEDURE — 99215 OFFICE O/P EST HI 40 MIN: CPT | Performed by: PSYCHIATRY & NEUROLOGY

## 2018-05-07 PROCEDURE — 76377 3D RENDER W/INTRP POSTPROCES: CPT | Performed by: PSYCHIATRY & NEUROLOGY

## 2018-05-07 PROCEDURE — 36224 PLACE CATH CAROTD ART: CPT | Performed by: PSYCHIATRY & NEUROLOGY

## 2018-05-07 PROCEDURE — 82565 ASSAY OF CREATININE: CPT

## 2018-05-07 PROCEDURE — 6360000004 HC RX CONTRAST MEDICATION: Performed by: PSYCHIATRY & NEUROLOGY

## 2018-05-07 PROCEDURE — 84132 ASSAY OF SERUM POTASSIUM: CPT

## 2018-05-07 PROCEDURE — 82947 ASSAY GLUCOSE BLOOD QUANT: CPT

## 2018-05-07 PROCEDURE — 82435 ASSAY OF BLOOD CHLORIDE: CPT

## 2018-05-07 PROCEDURE — 84520 ASSAY OF UREA NITROGEN: CPT

## 2018-05-07 PROCEDURE — 84295 ASSAY OF SERUM SODIUM: CPT

## 2018-05-07 PROCEDURE — 7100000010 HC PHASE II RECOVERY - FIRST 15 MIN

## 2018-05-07 PROCEDURE — 36415 COLL VENOUS BLD VENIPUNCTURE: CPT

## 2018-05-07 PROCEDURE — 85049 AUTOMATED PLATELET COUNT: CPT

## 2018-05-07 PROCEDURE — 7100000011 HC PHASE II RECOVERY - ADDTL 15 MIN

## 2018-05-07 PROCEDURE — 85014 HEMATOCRIT: CPT

## 2018-05-07 RX ORDER — ASCORBIC ACID 500 MG
1000 TABLET ORAL DAILY
COMMUNITY

## 2018-05-07 RX ORDER — IODIXANOL 270 MG/ML
100 INJECTION, SOLUTION INTRAVASCULAR
Status: COMPLETED | OUTPATIENT
Start: 2018-05-07 | End: 2018-05-07

## 2018-05-07 RX ORDER — CLINDAMYCIN PHOSPHATE 600 MG/50ML
600 INJECTION INTRAVENOUS ONCE
Status: DISCONTINUED | OUTPATIENT
Start: 2018-05-07 | End: 2018-05-10 | Stop reason: HOSPADM

## 2018-05-07 RX ORDER — ACETAMINOPHEN 325 MG/1
650 TABLET ORAL EVERY 4 HOURS PRN
Status: DISCONTINUED | OUTPATIENT
Start: 2018-05-07 | End: 2018-05-10 | Stop reason: HOSPADM

## 2018-05-07 RX ORDER — SODIUM CHLORIDE 9 MG/ML
INJECTION, SOLUTION INTRAVENOUS CONTINUOUS
Status: DISCONTINUED | OUTPATIENT
Start: 2018-05-07 | End: 2018-05-10 | Stop reason: HOSPADM

## 2018-05-07 RX ADMIN — IODIXANOL 38 ML: 270 INJECTION, SOLUTION INTRAVASCULAR at 10:54

## 2018-05-17 ENCOUNTER — TELEPHONE (OUTPATIENT)
Dept: NEUROLOGY | Age: 58
End: 2018-05-17

## 2018-05-25 ENCOUNTER — OFFICE VISIT (OUTPATIENT)
Dept: NEUROLOGY | Age: 58
End: 2018-05-25
Payer: MEDICARE

## 2018-05-25 VITALS
DIASTOLIC BLOOD PRESSURE: 65 MMHG | HEART RATE: 79 BPM | SYSTOLIC BLOOD PRESSURE: 114 MMHG | WEIGHT: 143 LBS | BODY MASS INDEX: 24.41 KG/M2 | HEIGHT: 64 IN

## 2018-05-25 DIAGNOSIS — I67.848 CEREBRAL VASOSPASM: Primary | ICD-10-CM

## 2018-05-25 DIAGNOSIS — I63.9 CEREBROVASCULAR ACCIDENT (CVA), UNSPECIFIED MECHANISM (HCC): ICD-10-CM

## 2018-05-25 PROCEDURE — 1036F TOBACCO NON-USER: CPT | Performed by: PSYCHIATRY & NEUROLOGY

## 2018-05-25 PROCEDURE — G8427 DOCREV CUR MEDS BY ELIG CLIN: HCPCS | Performed by: PSYCHIATRY & NEUROLOGY

## 2018-05-25 PROCEDURE — 3017F COLORECTAL CA SCREEN DOC REV: CPT | Performed by: PSYCHIATRY & NEUROLOGY

## 2018-05-25 PROCEDURE — G8598 ASA/ANTIPLAT THER USED: HCPCS | Performed by: PSYCHIATRY & NEUROLOGY

## 2018-05-25 PROCEDURE — 99214 OFFICE O/P EST MOD 30 MIN: CPT | Performed by: PSYCHIATRY & NEUROLOGY

## 2018-05-25 PROCEDURE — G8420 CALC BMI NORM PARAMETERS: HCPCS | Performed by: PSYCHIATRY & NEUROLOGY

## 2018-05-30 DIAGNOSIS — F41.9 ANXIETY: Primary | ICD-10-CM

## 2018-05-30 RX ORDER — DIAZEPAM 2 MG/1
TABLET ORAL
Qty: 1 TABLET | Refills: 0 | OUTPATIENT
Start: 2018-05-30 | End: 2018-06-06

## 2018-06-06 ENCOUNTER — HOSPITAL ENCOUNTER (OUTPATIENT)
Dept: MRI IMAGING | Age: 58
Discharge: HOME | End: 2018-06-08
Payer: MEDICARE

## 2018-06-06 DIAGNOSIS — I67.848 CEREBRAL VASOSPASM: ICD-10-CM

## 2018-06-06 DIAGNOSIS — I63.9 CEREBROVASCULAR ACCIDENT (CVA), UNSPECIFIED MECHANISM (HCC): ICD-10-CM

## 2018-06-06 PROCEDURE — 72141 MRI NECK SPINE W/O DYE: CPT

## 2018-06-06 PROCEDURE — 70551 MRI BRAIN STEM W/O DYE: CPT

## 2018-06-15 ENCOUNTER — TELEPHONE (OUTPATIENT)
Dept: NEUROLOGY | Age: 58
End: 2018-06-15

## 2018-07-31 NOTE — CARE COORDINATION
Case Management Initial Discharge Plan  [facility],         Readmission Risk              Readmission Risk:        12.5       Age 72 or Greater:  0    Admitted from SNF or Requires Paid or Family Care:  0    Currently has CHF,COPD,ARF,CRI,or is on dialysis:  0    Takes more than 5 Prescription Medications:  0    Takes Digoxin,Insulin,Anticoagulants,Narcotics or ASA/Plavix:  [address] in Past 12 Months:  10    On Disability:  0    Patient Considers own Health:  2.5            Met with:patient to discuss discharge plans. Information verified: address, contacts, phone number, , insurance Yes  PCP: Leny Ruiz MD  Date of last visit: within last year    Insurance Provider: [employer]    Discharge Planning  Current Residence:  Private Residence  Living Arrangements:  Spouse/Significant Other   Home has 1 story/0 stairs to climb  Support Systems:  Spouse/Significant Other, Children  Current Services PTA:  None Supplier: has cane but doesn't use  Patient able to perform ADL's:Independent  DME used to aid ambulation prior to admission: has cane/not used-/during admission    Potential Assistance Needed:  N/A    Pharmacy: [place] Medications:     Does patient want to participate in local refill/ meds to beds program?       Patient agreeable to home care: n/a  Freedom of choice provided:  n/a      Type of Home Care Services:  None  Patient expects to be discharged to:  home    Prior SNF/Rehab Placement and Facility: [facility] then  for 14 days  Agreeable to SNF/Rehab: n/a  [place] of choice provided: n/a   Evaluation: no    Expected Discharge date: Follow Up Appointment: Best Day/ Time:      Transportation provider: spouse  Transportation arrangements needed for discharge: No    Discharge Plan: Home independently w/spouse's support. Son lives in [facility] on pt's property.  Pt was at [facility] last May for 2 weeks, dc'd to Report received from [facility] at 0 pt sleeping but easily arousable site assessed no active bleeding noted . 2030 Pt up to bathroom , voided , assisted with dressing . Reviewed discharge instructions with pt . Step daughter called and verified discharge transportation for pt.   2130 pt discharged home via wheelchair , accompanied by step daughter Discharge information also discussed with her .

## 2019-06-07 ENCOUNTER — OFFICE VISIT (OUTPATIENT)
Dept: NEUROLOGY | Age: 59
End: 2019-06-07
Payer: MEDICARE

## 2019-06-07 VITALS
DIASTOLIC BLOOD PRESSURE: 80 MMHG | BODY MASS INDEX: 24.21 KG/M2 | HEART RATE: 78 BPM | WEIGHT: 141.8 LBS | SYSTOLIC BLOOD PRESSURE: 139 MMHG | HEIGHT: 64 IN

## 2019-06-07 DIAGNOSIS — I73.00 RAYNAUD'S PHENOMENON WITHOUT GANGRENE: ICD-10-CM

## 2019-06-07 DIAGNOSIS — G81.91 RIGHT HEMIPARESIS (HCC): ICD-10-CM

## 2019-06-07 DIAGNOSIS — R20.0 RIGHT SIDED NUMBNESS: ICD-10-CM

## 2019-06-07 DIAGNOSIS — I63.9 CEREBROVASCULAR ACCIDENT (CVA), UNSPECIFIED MECHANISM (HCC): ICD-10-CM

## 2019-06-07 DIAGNOSIS — I60.9 SUBARACHNOID HEMORRHAGE (HCC): Primary | ICD-10-CM

## 2019-06-07 PROCEDURE — 3017F COLORECTAL CA SCREEN DOC REV: CPT | Performed by: PSYCHIATRY & NEUROLOGY

## 2019-06-07 PROCEDURE — G8420 CALC BMI NORM PARAMETERS: HCPCS | Performed by: PSYCHIATRY & NEUROLOGY

## 2019-06-07 PROCEDURE — 99214 OFFICE O/P EST MOD 30 MIN: CPT | Performed by: PSYCHIATRY & NEUROLOGY

## 2019-06-07 PROCEDURE — 1036F TOBACCO NON-USER: CPT | Performed by: PSYCHIATRY & NEUROLOGY

## 2019-06-07 PROCEDURE — G8598 ASA/ANTIPLAT THER USED: HCPCS | Performed by: PSYCHIATRY & NEUROLOGY

## 2019-06-07 PROCEDURE — G8427 DOCREV CUR MEDS BY ELIG CLIN: HCPCS | Performed by: PSYCHIATRY & NEUROLOGY

## 2019-06-07 RX ORDER — BUTALBITAL, ACETAMINOPHEN AND CAFFEINE 50; 325; 40 MG/1; MG/1; MG/1
TABLET ORAL
Qty: 90 TABLET | Refills: 1 | Status: SHIPPED | OUTPATIENT
Start: 2019-06-07 | End: 2020-02-20 | Stop reason: SDUPTHER

## 2019-06-07 ASSESSMENT — ENCOUNTER SYMPTOMS
ALLERGIC/IMMUNOLOGIC NEGATIVE: 1
BACK PAIN: 1
RESPIRATORY NEGATIVE: 1

## 2019-06-07 NOTE — LETTER
[facility] Neurology Specialist  [place] [address] [address] [address]  Phone: [phone]  Fax: [phone]    Tae Encarncaion MD        June 7, 2019       Patient: Kiera Ashford   MR Number: [id number]   YOB: 1960   Date of Visit: 6/7/2019       Dear Dr. Sergey Schaefer: Thank you for the request for consultation for Kiera Ashford. Below are the relevant portions of my assessment and plan of care. Subjective:      Patient ID: Kiera Ashford is a 64 y.o. female. 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 She had later another Y stent assisted  Embolization after recanalization of AComm artery aneurysm was discovered. The condition is she reports that she reports ongoing residual weakness on right side with tingling numbnes of right arm and right leg . She is ambulating idependantly using cane only on uneven ground . When tired she will get wobbly . There is some decrease dexterity in right hand . She reports that over the last month she has greater numbness in right leg getting discoloration of her right foot . There is some right foot needle type pain more whe sedentary . She will have occipital headaches three times per week of grade 5 over 10 along with feeling foggy . There have been no seizures  . Significant medications aspirin 81 mg po qd . Testing  Head CT with extensive bilateral subarachnoid hemorrhage more basilar with Acomm cerebral aneury coiling . CTA head and neck 1.1 x 0.74 cm Acomm aneurysm . Cerebral angiogram improved cerebral vasospasm with moderate to severe left M1 MCA narrowing.  Worsening of the left A1 TAS[place] cerebral vasospasm with occlusion from spasm of the right A2 TASHA vessel just distal to its origin after the coiled MD Corazon at [address] Right 2016    KNEE    JOINT REPLACEMENT Left 2016    KNEE    OTHER SURGICAL HISTORY  10/31/2017    2 Neuroform Stents and multiple Pneumbra Smart Coils in head-3T Safe    OTHER SURGICAL HISTORY  04/14/2017    Many Axium Coils placed-see IR notes-OK 3T    FL EGD PERCUTANEOUS PLACEMENT GASTROSTOMY TUBE N/A 5/3/2017    EGD ESOPHAGOGASTRODUODENOSCOPY PEG TUBE INSERTION- GI UNIT SCHEDULED performed by Abhay Engel MD at [facility] 07/2017    TRACHEOSTOMY  06/2017    REMOVED     TRACHEOTOMY N/A 5/3/2017    TR[facility]OT[facility]Y performed [facility]el [facility] at [facility]       Family History   Problem Relation Age of Onset    Cancer Mother         PANCREATIC    Breast Cancer Mother     Other Mother         KIDNEY STONE    Diabetes Mother     COPD Father     Diabetes Father        Social History     Socioeconomic History    Marital status:      Spouse name: None    Number of children: None    Years of education: None    Highest education level: None   Occupational History    None   Social Needs    Financial resource strain: None    Food insecurity:     Worry: None     Inability: None    Transportation needs:     Medical: None     Non-medical: None   Tobacco Use    Smoking status: Former Smoker     Last attempt to quit: 10/12/2015     Years since quitting: 3.6    Smokeless tobacco: Never Used   Substance and Sexual Activity    Alcohol use: No    Drug use: No    Sexual activity: None   Lifestyle    Physical activity:     Days per week: None     Minutes per session: None    Stress: None   Relationships    Social connections:     Talks on phone: None     Gets together: None     Attends Restorationism service: None     Active member of club or organization: None     Attends meetings of clubs or organizations: None     Relationship status: None    Intimate partner violence: Fear of current or ex partner: None     Emotionally abused: None     Physically abused: None     Forced sexual activity: None   Other Topics Concern    None   Social History Narrative    None       Current Outpatient Medications   Medication Sig Dispense Refill    butalbital-acetaminophen-caffeine (FIORICET, ESGIC) -40 MG per tablet Take 1 po q 8 hours PRN 90 tablet 1    vitamin C (ASCORBIC ACID) 500 MG tablet Take 1,000 mg by mouth daily      lisinopril (PRINIVIL;ZESTRIL) 10 MG tablet Take 10 mg by mouth daily      butalbital-acetaminophen-caffeine (FIORICET, ESGIC) -40 MG per tablet Take 1 tablet by mouth every 4 hours as needed for Headaches      aspirin 325 MG tablet Take 325 mg by mouth daily      Omega-3 Fatty Acids (FISH OIL) 1000 MG CAPS Take 3,000 mg by mouth 3 times daily      Probiotic Product (PROBIOTIC DAILY PO) Take 1 tablet by mouth daily      leflunomide (ARAVA) 20 MG tablet Take 1 tablet by mouth daily      calcium carbonate-vitamin D (CALTRATE) 600-400 MG-UNIT TABS per tab Take 1 tablet by mouth 2 times daily      ferrous sulfate 325 (65 FE) MG tablet Take 325 mg by mouth daily (with breakfast)      Multiple Vitamins-Minerals (THERAPEUTIC MULTIVITAMIN-MINERALS) tablet Take 1 tablet by mouth daily       No current facility-administered medications for this visit. Allergies   Allergen Reactions    Latex Itching    Ibuprofen Nausea Only    Pcn [Penicillins] Anaphylaxis    Penicillin G Anaphylaxis    Morphine Hives    Tape [Adhesive Tape] Rash       Review of Systems   Constitutional: Positive for fatigue. HENT: Positive for tinnitus. Eyes: Positive for visual disturbance. Respiratory: Negative. Cardiovascular: Positive for chest pain and leg swelling. Endocrine: Negative. Genitourinary: Negative. Musculoskeletal: Positive for arthralgias, back pain, gait problem, myalgias and neck pain. Skin: Negative. Allergic/Immunologic: Negative. Neurological: Positive for weakness, numbness and headaches. Hematological: Negative. Psychiatric/Behavioral: Negative. Objective:   Physical Exam    Vitals:    06/07/19 0938   BP: 139/80   Pulse: 78     weight: 141 lb 12.8 oz (64.3 kg)    Neurological Examination  Constitutional .General exam well groomed   Ears /Nose/Throat: external ear . Normal exam  Neck and thyroid . Normal size  Cardiovascular: Auscultation of heart with regular rate and rhythm  Musculoskeletal. Muscle bulk and tone normal                                                           Muscle strength right arm 4+/5  right leg 4/5 with giveway otherwise 5/5 strength                                                                                 No dysmetria or dysdiadokinesis  No tremor   Mild decrease rig fine motor  Gait right lower extremity circumduction   Orientation Alert and oriented x 3 . WORLD able to spell forwards and back wards . Serial 7 to 93  . Short term tresa 3 words out of 3 in one minute   Attention and concentration normal   Language process and speech normal . No aphasia   Cranial nerve 2 normal acuety and visual fields  Cranial nerve 3, 4 and 6 . Extraocular muscles are intact . Pupils are equal and reactive   Cranial nerve 5 . Normal strength of masseter and temporalis . Intact corneal reflex. Decrease right facial sensation  Cranial nerve 7 normal exam   Cranial nerve 8. Grossly intact hearing   Cranial nerve 9 and 10. Symmetric palate elevation   Cranial nerve 11 , 5 out of 5 strength   Cranial Nerve 12 midline tongue . No atrophy  Sensation . Decrease pinprick and light touch right arm and leg  Deep Tendon Reflexes brisker on right  Plantar response extensor right     Assessment:      1. Subarachnoid hemorrhage ([address]    2. Cerebrovascular accident (CVA), unspecified mechanism ([address]    3. Raynaud's phenomenon without gangrene    4. Right hemiparesis ([address]    5.  Right sided numbness Patient is having Raynaud's of right leg with right foot discoloration to undergo arterial doppler study along with vascular surgery consultation . Suspect right side weakness and numbness complaints are old but will perform Head CT to rule out new process along with having patient see Dr Corrie Cooks in followup       Plan:      Orders Placed This Encounter   Procedures    VL DUP LOWER EXTREMITY ARTERIES BILATERAL     Standing Status:   Future     Standing Expiration Date:   6/7/2020    CT Head WO Contrast     Standing Status:   Future     Standing Expiration Date:   6/6/2020   Mike Thomas MD, Vascular Surgery, [place]     Referral Priority:   Routine     Referral Type:   Eval and Treat     Referral Reason:   Specialty Services Required     Referred to Provider:   Wild Redding MD     Requested Specialty:   Vascular Surgery     Number of Visits Requested:   Cierra De La Garza MD, Neuro-Endovascular Surgery, [place]     Referral Priority:   Routine     Referral Type:   Eval and Treat     Referral Reason:   Specialty Services Required     Referred to Provider:   Will Eddy MD     Requested Specialty:   Vascular Neurology     Number of Visits Requested:   1             If you have questions, please do not hesitate to call me. I look forward to following Juliet Tuttle along with you.     Sincerely,        Shakira Yip MD    CC providers:  Erick Hall MD  [address] [address]: [phone]

## 2019-06-07 NOTE — PROGRESS NOTES
Subjective:      Patient ID: Jana De Paz is a 64 y.o. female. 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 She had later another Y stent assisted  Embolization after recanalization of AComm artery aneurysm was discovered. The condition is she reports that she reports ongoing residual weakness on right side with tingling numbnes of right arm and right leg . She is ambulating idependantly using cane only on uneven ground . When tired she will get wobbly . There is some decrease dexterity in right hand . She reports that over the last month she has greater numbness in right leg getting discoloration of her right foot . There is some right foot needle type pain more whe sedentary . She will have occipital headaches three times per week of grade 5 over 10 along with feeling foggy . There have been no seizures  . Significant medications aspirin 81 mg po qd . Testing  Head CT with extensive bilateral subarachnoid hemorrhage more basilar with Acomm cerebral aneury coiling . CTA head and neck 1.1 x 0.74 cm Acomm aneurysm . Cerebral angiogram improved cerebral vasospasm with moderate to severe left M1 MCA narrowing. Worsening of the left A1 [place] cerebral vasospasm with occlusion from spasm of the right A2 [place] vessel just distal to its origin after the coiled aneurysm mass. Moderate right M1 MCA and basilar/pca arteries cerebral vasospasm . Collateral filling of the right [place] territory from MCAcollaterals (hypoplastic right a1 [place]) and right PCA. MRI of Head subacute evolving [place] territory infarct involving the right gyrus rectus present. Localized mass effect and edema with probable early laminar necrosis. Patchy acute left MCA territory infarcts are present. These involve the left insular/subinsular region as well as the posterior frontal region.  Acute TRACHEOSTOMY  06/2017    REMOVED     TRACHEOTOMY N/A 5/3/2017    TRACHEOTOMY performed by Abdi Small MD at [facility]       Family History   Problem Relation Age of Onset    Cancer Mother         PANCREATIC    Breast Cancer Mother     Other Mother         KIDNEY STONE    Diabetes Mother     COPD Father     Diabetes Father        Social History     Socioeconomic History    Marital status:      Spouse name: None    Number of children: None    Years of education: None    Highest education level: None   Occupational History    None   Social Needs    Financial resource strain: None    Food insecurity:     Worry: None     Inability: None    Transportation needs:     Medical: None     Non-medical: None   Tobacco Use    Smoking status: Former Smoker     Last attempt to quit: 10/12/2015     Years since quitting: 3.6    Smokeless tobacco: Never Used   Substance and Sexual Activity    Alcohol use: No    Drug use: No    Sexual activity: None   Lifestyle    Physical activity:     Days per week: None     Minutes per session: None    Stress: None   Relationships    Social connections:     Talks on phone: None     Gets together: None     Attends Amish service: None     Active member of club or organization: None     Attends meetings of clubs or organizations: None     Relationship status: None    Intimate partner violence:     Fear of current or ex partner: None     Emotionally abused: None     Physically abused: None     Forced sexual activity: None   Other Topics Concern    None   Social History Narrative    None       Current Outpatient Medications   Medication Sig Dispense Refill    butalbital-acetaminophen-caffeine (FIORICET, ESGIC) -40 MG per tablet Take 1 po q 8 hours PRN 90 tablet 1    vitamin C (ASCORBIC ACID) 500 MG tablet Take 1,000 mg by mouth daily      lisinopril (PRINIVIL;ZESTRIL) 10 MG tablet Take 10 mg by mouth daily      butalbital-acetaminophen-caffeine (FIORICET, ESGIC) -40 MG per tablet Take 1 tablet by mouth every 4 hours as needed for Headaches      aspirin 325 MG tablet Take 325 mg by mouth daily      Omega-3 Fatty Acids (FISH OIL) 1000 MG CAPS Take 3,000 mg by mouth 3 times daily      Probiotic Product (PROBIOTIC DAILY PO) Take 1 tablet by mouth daily      leflunomide (ARAVA) 20 MG tablet Take 1 tablet by mouth daily      calcium carbonate-vitamin D (CALTRATE) 600-400 MG-UNIT TABS per tab Take 1 tablet by mouth 2 times daily      ferrous sulfate 325 (65 FE) MG tablet Take 325 mg by mouth daily (with breakfast)      Multiple Vitamins-Minerals (THERAPEUTIC MULTIVITAMIN-MINERALS) tablet Take 1 tablet by mouth daily       No current facility-administered medications for this visit. Allergies   Allergen Reactions    Latex Itching    Ibuprofen Nausea Only    Pcn [Penicillins] Anaphylaxis    Penicillin G Anaphylaxis    Morphine Hives    Tape [Adhesive Tape] Rash       Review of Systems   Constitutional: Positive for fatigue. HENT: Positive for tinnitus. Eyes: Positive for visual disturbance. Respiratory: Negative. Cardiovascular: Positive for chest pain and leg swelling. Endocrine: Negative. Genitourinary: Negative. Musculoskeletal: Positive for arthralgias, back pain, gait problem, myalgias and neck pain. Skin: Negative. Allergic/Immunologic: Negative. Neurological: Positive for weakness, numbness and headaches. Hematological: Negative. Psychiatric/Behavioral: Negative. Objective:   Physical Exam    Vitals:    06/07/19 0938   BP: 139/80   Pulse: 78     weight: 141 lb 12.8 oz (64.3 kg)    Neurological Examination  Constitutional .General exam well groomed   Ears /Nose/Throat: external ear . Normal exam  Neck and thyroid . Normal size  Cardiovascular:  Auscultation of heart with regular rate and rhythm  Musculoskeletal. Muscle bulk and tone normal 6/6/2020   Fatoumata Prince MD, Vascular Surgery, [place]     Referral Priority:   Routine     Referral Type:   Eval and Treat     Referral Reason:   Specialty Services Required     Referred to Provider:   Candelario Herman MD     Requested Specialty:   Vascular Surgery     Number of Visits Requested:   Juan Daniel Antoine MD, Neuro-Endovascular Surgery, [place]     Referral Priority:   Routine     Referral Type:   Eval and Treat     Referral Reason:   Specialty Services Required     Referred to Provider:   Francy Tellez MD     Requested Specialty:   Vascular Neurology     Number of Visits Requested:   1

## 2019-06-07 NOTE — COMMUNICATION BODY
Subjective:      Patient ID: Patricia Weinberg is a 64 y.o. female. 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 She had later another Y stent assisted  Embolization after recanalization of AComm artery aneurysm was discovered. The condition is she reports that she reports ongoing residual weakness on right side with tingling numbnes of right arm and right leg . She is ambulating idependantly using cane only on uneven ground . When tired she will get wobbly . There is some decrease dexterity in right hand . She reports that over the last month she has greater numbness in right leg getting discoloration of her right foot . There is some right foot needle type pain more whe sedentary . She will have occipital headaches three times per week of grade 5 over 10 along with feeling foggy . There have been no seizures  . Significant medications aspirin 81 mg po qd . Testing  Head CT with extensive bilateral subarachnoid hemorrhage more basilar with Acomm cerebral aneury coiling . CTA head and neck 1.1 x 0.74 cm Acomm aneurysm . Cerebral angiogram improved cerebral vasospasm with moderate to severe left M1 MCA narrowing. Worsening of the left A1 [place] cerebral vasospasm with occlusion from spasm of the right A2 [place] vessel just distal to its origin after the coiled aneurysm mass. Moderate right M1 MCA and basilar/pca arteries cerebral vasospasm . Collateral filling of the right [place] territory from MCAcollaterals (hypoplastic right a1 [place]) and right PCA. MRI of Head subacute evolving [place] territory infarct involving the right gyrus rectus present. Localized mass effect and edema with probable early laminar necrosis. Patchy acute left MCA territory infarcts are present. These involve the left insular/subinsular region as well as the posterior frontal region.  Acute ischemia within the left periatrial white matter. FU Head CT with right frontal hemorrhage at site of prior ventriculostomy tract with bilateral intraventricular hemorrhage . Cerebral angiogram previously coiled A-comm aneurysm is noted with growth at the neck estimated at 5.9 x 2.8 x 3.6 mm. 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    Ruptured, Coiled 4/14/2017    Arthritis     no per patient, just has RA    Blurry vision, bilateral 05/08/2017    Hematoma of groin 10/17/2017    Right Groin    History of total bilateral knee replacement 2016    Left- 5/2016; Right- 8/2016    Hx of migraine headaches 04/2017    Hypertension 04/2018    Patient in clinical research study 10/31/2017    SMART Registry: Enrolled 10/31/2017;   Completion Date: 05/07/2018    RA (rheumatoid arthritis) ([address] 2011    Knees, Wrists, Hands    Seizure ([address] 04/2017    NONE SINCE    Tingling in extremities 04/14/2017    Right Extremities    Wears glasses 2007    READING       Past Surgical History:   Procedure Laterality Date    CRANIOTOMY Right 4/16/2017    RIGHT FRONTAL EXTERNAL VENTRICULAR DRAIN PLACEMENT performed by Wm Burt MD at [facility] Right 2016    KNEE    JOINT REPLACEMENT Left 2016    KNEE    OTHER SURGICAL HISTORY  10/31/2017    2 Neuroform Stents and multiple Pneumbra Smart Coils in head-3T Safe    OTHER SURGICAL HISTORY  04/14/2017    Many Axium Coils placed-see IR notes-OK 3T    VT EGD PERCUTANEOUS PLACEMENT GASTROSTOMY TUBE N/A 5/3/2017    EGD ESOPHAGOGASTRODUODENOSCOPY PEG TUBE INSERTION- GI UNIT SCHEDULED performed by Giovana Fuentes MD at [facility] APPROX 07/2017    TRACHEOSTOMY  06/2017    REMOVED     TRACHEOTOMY N/A 5/3/2017    TRACHEOTOMY performed by Sky Adan MD at [facility]       Family History   Problem Relation Age of Onset    Cancer Mother         PANCREATIC    Breast Cancer Mother     Other Mother         KIDNEY STONE    Diabetes Mother     COPD Father     Diabetes Father        Social History     Socioeconomic History    Marital status:      Spouse name: None    Number of children: None    Years of education: None    Highest education level: None   Occupational History    None   Social Needs    Financial resource strain: None    Food insecurity:     Worry: None     Inability: None    Transportation needs:     Medical: None     Non-medical: None   Tobacco Use    Smoking status: Former Smoker     Last attempt to quit: 10/12/2015     Years since quitting: 3.6    Smokeless tobacco: Never Used   Substance and Sexual Activity    Alcohol use: No    Drug use: No    Sexual activity: None   Lifestyle    Physical activity:     Days per week: None     Minutes per session: None    Stress: None   Relationships    Social connections:     Talks on phone: None     Gets together: None     Attends Christianity service: None     Active member of club or organization: None     Attends meetings of clubs or organizations: None     Relationship status: None    Intimate partner violence:     Fear of current or ex partner: None     Emotionally abused: None     Physically abused: None     Forced sexual activity: None   Other Topics Concern    None   Social History Narrative    None       Current Outpatient Medications   Medication Sig Dispense Refill    butalbital-acetaminophen-caffeine (FIORICET, ESGIC) -40 MG per tablet Take 1 po q 8 hours PRN 90 tablet 1    vitamin C (ASCORBIC ACID) 500 MG tablet Take 1,000 mg by mouth daily      lisinopril (PRINIVIL;ZESTRIL) 10 MG tablet Take 10 mg by mouth daily      butalbital-acetaminophen-caffeine (FIORICET, ESGIC) -40 MG per tablet Take 1 tablet by mouth every 4 hours as needed for Headaches      aspirin 325 MG tablet Take 325 mg by mouth daily      Omega-3 Fatty Acids (FISH OIL) 1000 MG CAPS Take 3,000 mg by mouth 3 times daily      Probiotic Product (PROBIOTIC DAILY PO) Take 1 tablet by mouth daily      leflunomide (ARAVA) 20 MG tablet Take 1 tablet by mouth daily      calcium carbonate-vitamin D (CALTRATE) 600-400 MG-UNIT TABS per tab Take 1 tablet by mouth 2 times daily      ferrous sulfate 325 (65 FE) MG tablet Take 325 mg by mouth daily (with breakfast)      Multiple Vitamins-Minerals (THERAPEUTIC MULTIVITAMIN-MINERALS) tablet Take 1 tablet by mouth daily       No current facility-administered medications for this visit. Allergies   Allergen Reactions    Latex Itching    Ibuprofen Nausea Only    Pcn [Penicillins] Anaphylaxis    Penicillin G Anaphylaxis    Morphine Hives    Tape [Adhesive Tape] Rash       Review of Systems   Constitutional: Positive for fatigue. HENT: Positive for tinnitus. Eyes: Positive for visual disturbance. Respiratory: Negative. Cardiovascular: Positive for chest pain and leg swelling. Endocrine: Negative. Genitourinary: Negative. Musculoskeletal: Positive for arthralgias, back pain, gait problem, myalgias and neck pain. Skin: Negative. Allergic/Immunologic: Negative. Neurological: Positive for weakness, numbness and headaches. Hematological: Negative. Psychiatric/Behavioral: Negative. Objective:   Physical Exam    Vitals:    06/07/19 0938   BP: 139/80   Pulse: 78     weight: 141 lb 12.8 oz (64.3 kg)    Neurological Examination  Constitutional .General exam well groomed   Ears /Nose/Throat: external ear . Normal exam  Neck and thyroid . Normal size  Cardiovascular:  Auscultation of heart with regular rate and rhythm  Musculoskeletal. Muscle bulk and tone normal Muscle strength right arm 4+/5  right leg 4/5 with giveway otherwise 5/5 strength                                                                                 No dysmetria or dysdiadokinesis  No tremor   Mild decrease rig fine motor  Gait right lower extremity circumduction   Orientation Alert and oriented x 3 . WORLD able to spell forwards and back wards . Serial 7 to 93  . Short term tresa 3 words out of 3 in one minute   Attention and concentration normal   Language process and speech normal . No aphasia   Cranial nerve 2 normal acuety and visual fields  Cranial nerve 3, 4 and 6 . Extraocular muscles are intact . Pupils are equal and reactive   Cranial nerve 5 . Normal strength of masseter and temporalis . Intact corneal reflex. Decrease right facial sensation  Cranial nerve 7 normal exam   Cranial nerve 8. Grossly intact hearing   Cranial nerve 9 and 10. Symmetric palate elevation   Cranial nerve 11 , 5 out of 5 strength   Cranial Nerve 12 midline tongue . No atrophy  Sensation . Decrease pinprick and light touch right arm and leg  Deep Tendon Reflexes brisker on right  Plantar response extensor right     Assessment:      1. Subarachnoid hemorrhage ([address]    2. Cerebrovascular accident (CVA), unspecified mechanism ([address]    3. Raynaud's phenomenon without gangrene    4. Right hemiparesis ([address]    5. Right sided numbness    Patient is having Raynaud's of right leg with right foot discoloration to undergo arterial doppler study along with vascular surgery consultation .  Suspect right side weakness and numbness complaints are old but will perform Head CT to rule out new process along with having patient see Dr Yaritza Hawthorne in followup       Plan:      Orders Placed This Encounter   Procedures    VL DUP LOWER EXTREMITY ARTERIES BILATERAL     Standing Status:   Future     Standing Expiration Date:   6/7/2020    CT Head WO Contrast     Standing Status:   Future     Standing Expiration Date:

## 2019-07-08 ENCOUNTER — INITIAL CONSULT (OUTPATIENT)
Dept: VASCULAR SURGERY | Age: 59
End: 2019-07-08
Payer: MEDICARE

## 2019-07-08 VITALS
SYSTOLIC BLOOD PRESSURE: 154 MMHG | WEIGHT: 139 LBS | HEART RATE: 77 BPM | TEMPERATURE: 98.1 F | OXYGEN SATURATION: 98 % | HEIGHT: 64 IN | BODY MASS INDEX: 23.73 KG/M2 | DIASTOLIC BLOOD PRESSURE: 79 MMHG

## 2019-07-08 DIAGNOSIS — I73.9 PAD (PERIPHERAL ARTERY DISEASE) (HCC): Primary | ICD-10-CM

## 2019-07-08 PROCEDURE — G8428 CUR MEDS NOT DOCUMENT: HCPCS | Performed by: SURGERY

## 2019-07-08 PROCEDURE — 99243 OFF/OP CNSLTJ NEW/EST LOW 30: CPT | Performed by: SURGERY

## 2019-07-08 PROCEDURE — G8420 CALC BMI NORM PARAMETERS: HCPCS | Performed by: SURGERY

## 2019-07-11 ASSESSMENT — ENCOUNTER SYMPTOMS
SHORTNESS OF BREATH: 0
ABDOMINAL PAIN: 0
CHEST TIGHTNESS: 0
ALLERGIC/IMMUNOLOGIC NEGATIVE: 1
COLOR CHANGE: 1

## 2019-07-11 NOTE — PROGRESS NOTES
CRANIOTOMY Right 4/16/2017    RIGHT FRONTAL EXTERNAL VENTRICULAR DRAIN PLACEMENT performed by Oumar Patel MD at [address] Right 2016    KNEE    JOINT REPLACEMENT Left 2016    KNEE    OTHER SURGICAL HISTORY  10/31/2017    2 Neuroform Stents and multiple Pneumbra Smart Coils in head-3T Safe    OTHER SURGICAL HISTORY  04/14/2017    Many Axium Coils placed-see IR notes-OK 3T    NM EGD PERCUTANEOUS PLACEMENT GASTROSTOMY TUBE N/A 5/3/2017    EGD ESOPHAGOGASTRODUODENOSCOPY PEG TUBE INSERTION- GI UNIT SCHEDULED performed by Rayshawn Pompa MD at [facility], REMOVED APPROX 07/2017    TRACHEOSTOMY  06/2017    REMOVED     TRACHEOTOMY N/A 5/3/2017    TRACHEOTOMY performed by Rayshawn Pompa MD at [facility]       Family History:     Family History   Problem Relation Age of Onset    Cancer Mother         PANCREATIC    Breast Cancer Mother     Other Mother         KIDNEY STONE    Diabetes Mother     COPD Father     Diabetes Father        Allergies:       Latex; Ibuprofen; Pcn [penicillins];  Penicillin g; Morphine; and Tape [adhesive tape]    Medications:      Current Outpatient Medications   Medication Sig Dispense Refill    butalbital-acetaminophen-caffeine (FIORICET, ESGIC) -40 MG per tablet Take 1 po q 8 hours PRN 90 tablet 1    vitamin C (ASCORBIC ACID) 500 MG tablet Take 1,000 mg by mouth daily      lisinopril (PRINIVIL;ZESTRIL) 10 MG tablet Take 10 mg by mouth daily      aspirin 325 MG tablet Take 325 mg by mouth daily      Omega-3 Fatty Acids (FISH OIL) 1000 MG CAPS Take 3,000 mg by mouth 3 times daily      Probiotic Product (PROBIOTIC DAILY PO) Take 1 tablet by mouth daily      leflunomide (ARAVA) 20 MG tablet Take 1 tablet by mouth daily      calcium carbonate-vitamin D (CALTRATE) 600-400 MG-UNIT TABS per tab Take 1 tablet by mouth 2 times daily      ferrous sulfate 325 (65 FE) MG tablet Take 325 mg by mouth daily (with breakfast)     

## 2019-07-15 ENCOUNTER — HOSPITAL ENCOUNTER (OUTPATIENT)
Dept: CT IMAGING | Age: 59
Discharge: HOME | End: 2019-07-17
Payer: MEDICARE

## 2019-07-15 ENCOUNTER — APPOINTMENT (OUTPATIENT)
Dept: VASCULAR LAB | Age: 59
End: 2019-07-15
Payer: MEDICARE

## 2019-07-15 DIAGNOSIS — I63.9 CEREBROVASCULAR ACCIDENT (CVA), UNSPECIFIED MECHANISM (HCC): ICD-10-CM

## 2019-07-15 PROCEDURE — 70450 CT HEAD/BRAIN W/O DYE: CPT

## 2019-07-31 ENCOUNTER — OFFICE VISIT (OUTPATIENT)
Dept: NEUROLOGY | Age: 59
End: 2019-07-31
Payer: MEDICARE

## 2019-07-31 VITALS — DIASTOLIC BLOOD PRESSURE: 84 MMHG | HEART RATE: 71 BPM | SYSTOLIC BLOOD PRESSURE: 149 MMHG

## 2019-07-31 DIAGNOSIS — Z95.828 MRI-SAFE ENDOVASCULAR ANEURYSM COIL PRESENT: ICD-10-CM

## 2019-07-31 DIAGNOSIS — I67.1 ANEURYSM OF ANTERIOR COMMUNICATING ARTERY: Primary | ICD-10-CM

## 2019-07-31 PROCEDURE — G8427 DOCREV CUR MEDS BY ELIG CLIN: HCPCS | Performed by: PSYCHIATRY & NEUROLOGY

## 2019-07-31 PROCEDURE — G8598 ASA/ANTIPLAT THER USED: HCPCS | Performed by: PSYCHIATRY & NEUROLOGY

## 2019-07-31 PROCEDURE — 99214 OFFICE O/P EST MOD 30 MIN: CPT | Performed by: PSYCHIATRY & NEUROLOGY

## 2019-07-31 PROCEDURE — 1036F TOBACCO NON-USER: CPT | Performed by: PSYCHIATRY & NEUROLOGY

## 2019-07-31 PROCEDURE — G8420 CALC BMI NORM PARAMETERS: HCPCS | Performed by: PSYCHIATRY & NEUROLOGY

## 2019-07-31 PROCEDURE — 3017F COLORECTAL CA SCREEN DOC REV: CPT | Performed by: PSYCHIATRY & NEUROLOGY

## 2019-07-31 ASSESSMENT — ENCOUNTER SYMPTOMS
COLOR CHANGE: 0
COUGH: 0
NAUSEA: 0
VOMITING: 0
SHORTNESS OF BREATH: 0
VOICE CHANGE: 0
PHOTOPHOBIA: 0

## 2019-08-01 NOTE — PROGRESS NOTES
Endovascular Neurosurgery - [address], [address] [facility], [address] [place] [address]  P: [phone]  F: [phone]      Dear Dr. Tori Tadeo    HPI:     CHANDRA Mendez is a 64 y.o. female with a history of subarachnoid hemorrhage and ruptured anterior communicating artery aneurysm with initial coil embolization on April 14, 2017. she had successful Y stent assisted coil embolization on October 31, 2017 of the noted recanalization. Her May 7, 2018 cerebral angiogram demonstrated patency of the stent with possible residual 1.5 x 3 mm aneurysm of the shoulder of the left a2 anterior cerebral artery. She was continued on aspirin 81 mg alone. She has been most apparent with no residual deficits. She has not resumed driving at this time. She has no persistent unsteadiness surgeries when she is holding her grandchildren otherwise she has not had any falls or unusual injuries to herself. She obtained a recent CT of the head which demonstrated no new abnormalities. She had follow-up with Dr. Leodan Ryan for the right lower extremity and discoloration worse with cold temperatures. We recommended ongoing monitoring and continuing aspirin with no intervention at this time.     Allergies   Allergen Reactions    Latex Itching and Rash    Ibuprofen Nausea Only    Pcn [Penicillins] Anaphylaxis    Penicillin G Anaphylaxis    Morphine Hives    Tape [Adhesive Tape] Rash     Current Outpatient Medications   Medication Sig Dispense Refill    butalbital-acetaminophen-caffeine (FIORICET, ESGIC) -40 MG per tablet Take 1 po q 8 hours PRN 90 tablet 1    vitamin C (ASCORBIC ACID) 500 MG tablet Take 1,000 mg by mouth daily      lisinopril (PRINIVIL;ZESTRIL) 10 MG tablet Take 10 mg by mouth daily      butalbital-acetaminophen-caffeine (FIORICET, ESGIC) -40 MG per tablet Take 1 tablet by mouth every 4 hours as needed for Headaches      aspirin 325 MG tablet Take 325 mg by mouth daily      Omega-3

## 2019-08-03 ENCOUNTER — HOSPITAL ENCOUNTER (OUTPATIENT)
Age: 59
Discharge: HOME | End: 2019-08-03
Payer: MEDICARE

## 2019-08-03 DIAGNOSIS — Z95.828 MRI-SAFE ENDOVASCULAR ANEURYSM COIL PRESENT: ICD-10-CM

## 2019-08-03 DIAGNOSIS — I67.1 ANEURYSM OF ANTERIOR COMMUNICATING ARTERY: ICD-10-CM

## 2019-08-03 LAB
BUN BLDV-MCNC: 9 MG/DL (ref 6–20)
CREAT SERPL-MCNC: 0.48 MG/DL (ref 0.5–0.9)
GFR AFRICAN AMERICAN: >60 ML/MIN
GFR NON-AFRICAN AMERICAN: >60 ML/MIN
GFR SERPL CREATININE-BSD FRML MDRD: ABNORMAL ML/MIN/{1.73_M2}
GFR SERPL CREATININE-BSD FRML MDRD: ABNORMAL ML/MIN/{1.73_M2}

## 2019-08-03 PROCEDURE — 82565 ASSAY OF CREATININE: CPT

## 2019-08-03 PROCEDURE — 36415 COLL VENOUS BLD VENIPUNCTURE: CPT

## 2019-08-03 PROCEDURE — 84520 ASSAY OF UREA NITROGEN: CPT

## 2019-08-08 ENCOUNTER — TELEPHONE (OUTPATIENT)
Dept: NEUROLOGY | Age: 59
End: 2019-08-08

## 2019-08-08 DIAGNOSIS — I67.1 ANEURYSM OF ANTERIOR COMMUNICATING ARTERY: Primary | ICD-10-CM

## 2019-08-08 DIAGNOSIS — Z95.828 MRI-SAFE ENDOVASCULAR ANEURYSM COIL PRESENT: ICD-10-CM

## 2019-08-09 ENCOUNTER — HOSPITAL ENCOUNTER (OUTPATIENT)
Dept: MRI IMAGING | Age: 59
Discharge: HOME | End: 2019-08-11
Payer: MEDICARE

## 2019-08-09 DIAGNOSIS — I67.1 ANEURYSM OF ANTERIOR COMMUNICATING ARTERY: ICD-10-CM

## 2019-08-09 DIAGNOSIS — Z95.828 MRI-SAFE ENDOVASCULAR ANEURYSM COIL PRESENT: ICD-10-CM

## 2019-08-09 PROCEDURE — 70546 MR ANGIOGRAPH HEAD W/O&W/DYE: CPT

## 2019-08-09 PROCEDURE — 6360000004 HC RX CONTRAST MEDICATION: Performed by: PSYCHIATRY & NEUROLOGY

## 2019-08-09 PROCEDURE — A9576 INJ PROHANCE MULTIPACK: HCPCS | Performed by: PSYCHIATRY & NEUROLOGY

## 2019-08-09 RX ADMIN — GADOTERIDOL 12 ML: 279.3 INJECTION, SOLUTION INTRAVENOUS at 08:52

## 2019-08-20 ENCOUNTER — OFFICE VISIT (OUTPATIENT)
Dept: NEUROLOGY | Age: 59
End: 2019-08-20
Payer: MEDICARE

## 2019-08-20 VITALS
SYSTOLIC BLOOD PRESSURE: 130 MMHG | BODY MASS INDEX: 23.56 KG/M2 | WEIGHT: 138 LBS | HEIGHT: 64 IN | HEART RATE: 68 BPM | DIASTOLIC BLOOD PRESSURE: 75 MMHG

## 2019-08-20 DIAGNOSIS — I67.1 ANEURYSM OF ANTERIOR COMMUNICATING ARTERY: Primary | ICD-10-CM

## 2019-08-20 DIAGNOSIS — I63.9 CEREBROVASCULAR ACCIDENT (CVA), UNSPECIFIED MECHANISM (HCC): ICD-10-CM

## 2019-08-20 PROCEDURE — G8598 ASA/ANTIPLAT THER USED: HCPCS | Performed by: PSYCHIATRY & NEUROLOGY

## 2019-08-20 PROCEDURE — G8427 DOCREV CUR MEDS BY ELIG CLIN: HCPCS | Performed by: PSYCHIATRY & NEUROLOGY

## 2019-08-20 PROCEDURE — 99214 OFFICE O/P EST MOD 30 MIN: CPT | Performed by: PSYCHIATRY & NEUROLOGY

## 2019-08-20 PROCEDURE — G8420 CALC BMI NORM PARAMETERS: HCPCS | Performed by: PSYCHIATRY & NEUROLOGY

## 2019-08-20 PROCEDURE — 3017F COLORECTAL CA SCREEN DOC REV: CPT | Performed by: PSYCHIATRY & NEUROLOGY

## 2019-08-20 PROCEDURE — 1036F TOBACCO NON-USER: CPT | Performed by: PSYCHIATRY & NEUROLOGY

## 2019-08-20 ASSESSMENT — ENCOUNTER SYMPTOMS
RESPIRATORY NEGATIVE: 1
ALLERGIC/IMMUNOLOGIC NEGATIVE: 1

## 2019-08-20 NOTE — PROGRESS NOTES
Subjective:      Patient ID: Leeanna Hammans is a 64 y.o. female. 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 She had later another Y stent assisted  Embolization after recanalization of AComm artery aneurysm was discovered. Raynaud's of right leg with right foot discoloration to undergo arterial doppler study along with vascular surgery consultation . The condition is she he saw vascular surgery who felt right lower extremity Raynaud's was benign seeing good pulse being precipitated by cold weaher not needing arterial dopplers . FU Head CT status post coil packing anterior circe of Rust . Encephalomalacia right frontal lobe. She had MRA of Head through Dr Zahida Sanders showing coil mass Right Acomm with tiny residual infundibulum . There are right occiital headaches of throbbing component of grade 6 over 10 attenuates with fioricet . There maybe some lighteadedness when standing of mild degree. There is mild residual weakness on right side with tingling numbnes of right arm and right leg . She is ambulating idependantly using cane only on uneven ground . When tired she will get wobbly . There is some decrease dexterity in right hand . There have been no seizures  . Significant medications aspirin 81 mg po qd , fiorcet PRN . Testing  Head CT with extensive bilateral subarachnoid hemorrhage more basilar with Acomm cerebral aneury coiling . CTA head and neck 1.1 x 0.74 cm Acomm aneurysm . Cerebral angiogram improved cerebral vasospasm with moderate to severe left M1 MCA narrowing. Worsening of the left A1 TASHA cerebral vasospasm with occlusion from spasm of the right A2 TAS[place] vessel just distal to its origin after the coiled aneurysm mass. Moderate right M1 MCA and basilar/pca arteries cerebral vasospasm . Collateral filling of the right TASHA territory from

## 2019-08-20 NOTE — LETTER
[facility]  [address] [place] [address]  Phone: [phone]  Fax: [phone]    Mike Talley MD        August 20, 2019       Patient: Ileana Zamora   MR Number: [id number]   YOB: 1960   Date of Visit: 8/20/2019       Dear Dr. Michelle Hall: Thank you for the request for consultation for Ileana Zamora. Below are the relevant portions of my assessment and plan of care. Subjective:      Patient ID: Ileana Zamora is a 64 y.o. female. 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 She had later another Y stent assisted  Embolization after recanalization of AComm artery aneurysm was discovered. Raynaud's of right leg with right foot discoloration to undergo arterial doppler study along with vascular surgery consultation . The condition is she he saw vascular surgery who felt right lower extremity Raynaud's was benign seeing good pulse being precipitated by cold weaher not needing arterial dopplers . FU Head CT status post coil packing anterior circe of Rust . Encephalomalacia right frontal lobe. She had MRA of Head through Dr Jaiden Salazar showing coil mass Right Acomm with tiny residual infundibulum . There are right occiital headaches of throbbing component of grade 6 over 10 attenuates with fioricet . There maybe some lighteadedness when standing of mild degree. There is mild residual weakness on right side with tingling numbnes of right arm and right leg . She is ambulating idependantly using cane only on uneven ground . When tired she will get wobbly . There is some decrease dexterity in right hand . There have been no seizures  . Significant medications aspirin 81 mg po qd , fiorcet PRN .  Testing  Head CT with extensive  Pcn [Penicillins] Anaphylaxis    Penicillin G Anaphylaxis    Morphine Hives    Tape [Adhesive Tape] Rash       Review of Systems   Constitutional: Positive for fatigue. HENT: Negative. Eyes: Positive for visual disturbance. Respiratory: Negative. Cardiovascular: Negative. Endocrine: Negative. Genitourinary: Negative. Musculoskeletal: Positive for arthralgias, myalgias and neck pain. Skin: Negative. Allergic/Immunologic: Negative. Neurological: Positive for dizziness, weakness, numbness and headaches. Hematological: Negative. Psychiatric/Behavioral: Negative. Objective:   Physical Exam    Vitals:    08/20/19 0853   BP: 130/75   Pulse: 68     weight: 138 lb (62.6 kg)    Neurological Examination  Constitutional .General exam well groomed   Ears /Nose/Throat: external ear . Normal exam  Neck and thyroid . Normal size  Cardiovascular: Auscultation of heart with regular rate and rhythm  Musculoskeletal. Muscle bulk and tone normal                                                           Muscle strength right arm 4+/5  right leg 4/5 with giveway otherwise 5/5 strength                                                                                 No dysmetria or dysdiadokinesis  No tremor   Mild decrease rig fine motor  Gait right lower extremity circumduction   Orientation Alert and oriented x 3 . WORLD able to spell forwards and back wards . Serial 7 to 93  . Short term tresa 3 words out of 3 in one minute   Attention and concentration normal   Language process and speech normal . No aphasia   Cranial nerve 2 normal acuety and visual fields  Cranial nerve 3, 4 and 6 . Extraocular muscles are intact . Pupils are equal and reactive   Cranial nerve 5 . Normal strength of masseter and temporalis . Intact corneal reflex. Decrease right facial sensation  Cranial nerve 7 normal exam   Cranial nerve 8. Grossly intact hearing   Cranial nerve 9 and 10.  Symmetric palate elevation

## 2019-08-20 NOTE — COMMUNICATION BODY
Subjective:      Patient ID: Fanta Fraser is a 64 y.o. female. 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 She had later another Y stent assisted  Embolization after recanalization of AComm artery aneurysm was discovered. Raynaud's of right leg with right foot discoloration to undergo arterial doppler study along with vascular surgery consultation . The condition is she he saw vascular surgery who felt right lower extremity Raynaud's was benign seeing good pulse being precipitated by cold weaher not needing arterial dopplers . FU Head CT status post coil packing anterior circe of Rust . Encephalomalacia right frontal lobe. She had MRA of Head through Dr Shelbi Minor showing coil mass Right Acomm with tiny residual infundibulum . There are right occiital headaches of throbbing component of grade 6 over 10 attenuates with fioricet . There maybe some lighteadedness when standing of mild degree. There is mild residual weakness on right side with tingling numbnes of right arm and right leg . She is ambulating idependantly using cane only on uneven ground . When tired she will get wobbly . There is some decrease dexterity in right hand . There have been no seizures  . Significant medications aspirin 81 mg po qd , fiorcet PRN . Testing  Head CT with extensive bilateral subarachnoid hemorrhage more basilar with Acomm cerebral aneury coiling . CTA head and neck 1.1 x 0.74 cm Acomm aneurysm . Cerebral angiogram improved cerebral vasospasm with moderate to severe left M1 MCA narrowing. Worsening of the left A1 TASHA cerebral vasospasm with occlusion from spasm of the right A2 TAS[place] vessel just distal to its origin after the coiled aneurysm mass. Moderate right M1 MCA and basilar/pca arteries cerebral vasospasm . Collateral filling of the right TASHA territory from

## 2020-02-20 ENCOUNTER — OFFICE VISIT (OUTPATIENT)
Dept: NEUROLOGY | Age: 60
End: 2020-02-20
Payer: MEDICARE

## 2020-02-20 VITALS
HEIGHT: 64 IN | DIASTOLIC BLOOD PRESSURE: 93 MMHG | SYSTOLIC BLOOD PRESSURE: 173 MMHG | WEIGHT: 139.8 LBS | HEART RATE: 73 BPM | BODY MASS INDEX: 23.87 KG/M2

## 2020-02-20 PROCEDURE — 99214 OFFICE O/P EST MOD 30 MIN: CPT | Performed by: PSYCHIATRY & NEUROLOGY

## 2020-02-20 PROCEDURE — G8484 FLU IMMUNIZE NO ADMIN: HCPCS | Performed by: PSYCHIATRY & NEUROLOGY

## 2020-02-20 PROCEDURE — G8427 DOCREV CUR MEDS BY ELIG CLIN: HCPCS | Performed by: PSYCHIATRY & NEUROLOGY

## 2020-02-20 PROCEDURE — G8420 CALC BMI NORM PARAMETERS: HCPCS | Performed by: PSYCHIATRY & NEUROLOGY

## 2020-02-20 PROCEDURE — 1036F TOBACCO NON-USER: CPT | Performed by: PSYCHIATRY & NEUROLOGY

## 2020-02-20 PROCEDURE — 3017F COLORECTAL CA SCREEN DOC REV: CPT | Performed by: PSYCHIATRY & NEUROLOGY

## 2020-02-20 RX ORDER — LISINOPRIL AND HYDROCHLOROTHIAZIDE 25; 20 MG/1; MG/1
1 TABLET ORAL DAILY
COMMUNITY
End: 2022-04-06

## 2020-02-20 RX ORDER — BUTALBITAL, ACETAMINOPHEN AND CAFFEINE 50; 325; 40 MG/1; MG/1; MG/1
TABLET ORAL
Qty: 90 TABLET | Refills: 1 | Status: SHIPPED | OUTPATIENT
Start: 2020-02-20 | End: 2020-08-18 | Stop reason: SDUPTHER

## 2020-02-20 ASSESSMENT — ENCOUNTER SYMPTOMS
RESPIRATORY NEGATIVE: 1
ALLERGIC/IMMUNOLOGIC NEGATIVE: 1

## 2020-02-20 NOTE — LETTER
to its origin after the coiled aneurysm mass. Moderate right M1 MCA and basilar/pca arteries cerebral vasospasm . Collateral filling of the right [place] territory from MCAcollaterals (hypoplastic right a1 [place]) and right PCA. MRI of Head subacute evolving [place] territory infarct involving the right gyrus rectus present. Localized mass effect and edema with probable early laminar necrosis. Patchy acute left MCA territory infarcts are present. These involve the left insular/subinsular region as well as the posterior frontal region. Acute ischemia within the left periatrial white matter. FU Head CT with right frontal hemorrhage at site of prior ventriculostomy tract with bilateral intraventricular hemorrhage . Cerebral angiogram previously coiled A-comm aneurysm is noted with growth at the neck estimated at 5.9 x 2.8 x 3.6 mm. Status post successful Y stent assisted transarterial coil embolization of previously ruptured A-comm artery cerebral aneurysm. FU Head CT status post coil packing anterior circe of Rust . Encephalomalacia right frontal lobe . 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    Ruptured, Coiled 4/14/2017    Arthritis     no per patient, just has RA    Blurry vision, bilateral 05/08/2017    Hematoma of groin 10/17/2017    Right Groin    History of total bilateral knee replacement 2016    Left- 5/2016; Right- 8/2016    Hx of migraine headaches 04/2017    Hypertension 04/2018    Patient in clinical research study 10/31/2017    SMART Registry: Enrolled 10/31/2017;   Completion Date: 05/07/2018    RA (rheumatoid arthritis) ([address] 2011    Knees, Wrists, Hands    Seizure ([address] 04/2017    NONE SINCE    Tingling in extremities 04/14/2017    Right Extremities  Wears glasses     READING       Past Surgical History:   Procedure Laterality Date    CRANIOTOMY Right 2017    RIGHT FRONTAL EXTERNAL VENTRICULAR DRAIN PLACEMENT performed by Juanjo Haddad MD at [address] [facility] Right 2016    KNEE    JOINT REPLACEMENT Left 2016    KNEE    OTHER SURGICAL HISTORY  10/31/2017    2 Neuroform Stents and multiple Pneumbra Smart Coils in head-3T Safe    OTHER SURGICAL HISTORY  2017    Many Axium Coils placed-see IR notes-OK 3T    MI EGD PERCUTANEOUS PLACEMENT GASTROSTOMY TUBE N/A 5/3/2017    EGD ESOPHAGOGASTRODUODENOSCOPY PEG TUBE INSERTION- GI UNIT SCHEDULED performed by Una Rivera MD at [facility] APPROX 2017    TRACHEOSTOMY  2017    REMOVED     TRACHEOTOMY N/A 5/3/2017    TRACHEOTOMY performed by Una Rivera MD at [facility]       Family History   Problem Relation Age of Onset    Cancer Mother         PANCREATIC    Breast Cancer Mother     Other Mother         KIDNEY STONE    Diabetes Mother     COPD Father     Diabetes Father        Social History     Socioeconomic History    Marital status:      Spouse name: None    Number of children: None    Years of education: None    Highest education level: None   Occupational History    None   Social Needs    Financial resource strain: None    Food insecurity:     Worry: None     Inability: None    Transportation needs:     Medical: None     Non-medical: None   Tobacco Use    Smoking status: Former Smoker     Last attempt to quit: 10/12/2015     Years since quittin.3    Smokeless tobacco: Never Used   Substance and Sexual Activity    Alcohol use: No    Drug use: No    Sexual activity: None   Lifestyle    Physical activity:     Days per week: None     Minutes per session: None    Stress: None   Relationships    Social connections:     Talks on phone: None     Gets together: None     Attends [employer] service: None Active member of club or organization: None     Attends meetings of clubs or organizations: None     Relationship status: None    Intimate partner violence:     Fear of current or ex partner: None     Emotionally abused: None     Physically abused: None     Forced sexual activity: None   Other Topics Concern    None   Social History Narrative    None       Current Outpatient Medications   Medication Sig Dispense Refill    lisinopril-hydroCHLOROthiazide (PRINZIDE;ZESTORETIC) 20-25 MG per tablet Take 1 tablet by mouth daily      butalbital-acetaminophen-caffeine (FIORICET, ESGIC) -40 MG per tablet Take 1 po q 8 hours PRN 90 tablet 1    aspirin 81 MG tablet Take 81 mg by mouth daily      vitamin C (ASCORBIC ACID) 500 MG tablet Take 1,000 mg by mouth daily      butalbital-acetaminophen-caffeine (FIORICET, ESGIC) -40 MG per tablet Take 1 tablet by mouth every 4 hours as needed for Headaches      leflunomide (ARAVA) 20 MG tablet Take 1 tablet by mouth daily      calcium carbonate-vitamin D (CALTRATE) 600-400 MG-UNIT TABS per tab Take 1 tablet by mouth 2 times daily      lisinopril (PRINIVIL;ZESTRIL) 10 MG tablet Take 10 mg by mouth daily      Omega-3 Fatty Acids (FISH OIL) 1000 MG CAPS Take 3,000 mg by mouth 3 times daily      Probiotic Product (PROBIOTIC DAILY PO) Take 1 tablet by mouth daily      ferrous sulfate 325 (65 FE) MG tablet Take 325 mg by mouth daily (with breakfast)      Multiple Vitamins-Minerals (THERAPEUTIC MULTIVITAMIN-MINERALS) tablet Take 1 tablet by mouth daily       No current facility-administered medications for this visit. Allergies   Allergen Reactions    Latex Itching and Rash    Ibuprofen Nausea Only    Pcn [Penicillins] Anaphylaxis    Penicillin G Anaphylaxis    Morphine Hives    Tape [Adhesive Tape] Rash       Review of Systems   Constitutional: Positive for fatigue. HENT: Negative. Eyes: Positive for visual disturbance. Respiratory: Negative. Cardiovascular: Negative. Endocrine: Negative. Genitourinary: Negative. Musculoskeletal: Positive for arthralgias, myalgias and neck pain. Skin: Negative. Allergic/Immunologic: Negative. Neurological: Positive for dizziness, weakness, numbness and headaches. Hematological: Negative. Psychiatric/Behavioral: Negative. Objective:   Physical Exam    Vitals:    02/20/20 0843   BP: (!) 173/93   Pulse: 73     weight: 139 lb 12.8 oz (63.4 kg)    Neurological Examination  Constitutional .General exam well groomed   Ears /Nose/Throat: external ear . Normal exam  Neck and thyroid . Normal size  Cardiovascular: Auscultation of heart with regular rate and rhythm  Musculoskeletal. Muscle bulk and tone normal                                                           Muscle strength right arm 4+/5  right leg 4/5 with giveway otherwise 5/5 strength                                                                                 No dysmetria or dysdiadokinesis  No tremor   Mild decrease rig fine motor  Gait right lower extremity circumduction   Orientation Alert and oriented x 3 . WORLD able to spell forwards and back wards . Serial 7 to 93  . Short term tresa 3 words out of 3 in one minute   Attention and concentration normal   Language process and speech normal . No aphasia   Cranial nerve 2 normal acuety and visual fields  Cranial nerve 3, 4 and 6 . Extraocular muscles are intact . Pupils are equal and reactive   Cranial nerve 5 . Normal strength of masseter and temporalis . Intact corneal reflex. Decrease right facial sensation  Cranial nerve 7 normal exam   Cranial nerve 8. Grossly intact hearing   Cranial nerve 9 and 10. Symmetric palate elevation   Cranial nerve 11 , 5 out of 5 strength   Cranial Nerve 12 midline tongue . No atrophy  Sensation .  Decrease pinprick and light touch right arm and leg  Deep Tendon Reflexes brisker on right  Plantar response extensor right

## 2020-02-20 NOTE — PROGRESS NOTES
Subjective:      Patient ID: Vanessa Polanco is a 64 y.o. female. 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 She had later another Y stent assisted embolization after recanalization of AComm artery aneurysm was discovered. Raynaud's of right leg with right foot discoloration . The condition is she is doing well with occasional headache about thre times per month over occipital parietal head of grade 6 over 10 attenuated with  fioricet . There is some tingling of right arm and right leg with no focal weakness . There will be ligheadeness at times . She is ambulating well with no use of cane . There have been no seizures . She does reports that her thinking is slower . She has seen vascular surgery who felt right lower extremity Raynaud's was benign seeing good pulse being precipitated by cold weather not needing arterial dopplers  . Significant medications aspirin 81 mg po qd , fioircet PRN . Testing  Head CT with extensive bilateral subarachnoid hemorrhage more basilar with Acomm cerebral aneury coiling . CTA head and neck 1.1 x 0.74 cm Acomm aneurysm . Cerebral angiogram improved cerebral vasospasm with moderate to severe left M1 MCA narrowing. Worsening of the left A1 [place] cerebral vasospasm with occlusion from spasm of the right A2 [place] vessel just distal to its origin after the coiled aneurysm mass. Moderate right M1 MCA and basilar/pca arteries cerebral vasospasm . Collateral filling of the right [place] territory from MCAcollaterals (hypoplastic right a1 [place]) and right PCA. MRI of Head subacute evolving [place] territory infarct involving the right gyrus rectus present. Localized mass effect and edema with probable early laminar necrosis. Patchy acute left MCA territory infarcts are present.  These involve the left insular/subinsular region as well as Examination  Constitutional .General exam well groomed   Ears /Nose/Throat: external ear . Normal exam  Neck and thyroid . Normal size  Cardiovascular: Auscultation of heart with regular rate and rhythm  Musculoskeletal. Muscle bulk and tone normal                                                           Muscle strength right arm 4+/5  right leg 4/5 with giveway otherwise 5/5 strength                                                                                 No dysmetria or dysdiadokinesis  No tremor   Mild decrease rig fine motor  Gait right lower extremity circumduction   Orientation Alert and oriented x 3 . WORLD able to spell forwards and back wards . Serial 7 to 93  . Short term tresa 3 words out of 3 in one minute   Attention and concentration normal   Language process and speech normal . No aphasia   Cranial nerve 2 normal acuety and visual fields  Cranial nerve 3, 4 and 6 . Extraocular muscles are intact . Pupils are equal and reactive   Cranial nerve 5 . Normal strength of masseter and temporalis . Intact corneal reflex. Decrease right facial sensation  Cranial nerve 7 normal exam   Cranial nerve 8. Grossly intact hearing   Cranial nerve 9 and 10. Symmetric palate elevation   Cranial nerve 11 , 5 out of 5 strength   Cranial Nerve 12 midline tongue . No atrophy  Sensation . Decrease pinprick and light touch right arm and leg  Deep Tendon Reflexes brisker on right  Plantar response extensor right     Assessment:      1. Aneurysm of anterior communicating artery    2. Cerebrovascular accident (CVA), unspecified mechanism ([address]    3. Subarachnoid hemorrhage ([address]    4.  Migraine without aura and without status migrainosus, not intractable    Patient is doing well to continue current regimen      Plan:      As above

## 2020-02-21 NOTE — COMMUNICATION BODY
Subjective:      Patient ID: Shelby Burton is a 64 y.o. female. 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 She had later another Y stent assisted embolization after recanalization of AComm artery aneurysm was discovered. Raynaud's of right leg with right foot discoloration . The condition is she is doing well with occasional headache about thre times per month over occipital parietal head of grade 6 over 10 attenuated with  fioricet . There is some tingling of right arm and right leg with no focal weakness . There will be ligheadeness at times . She is ambulating well with no use of cane . There have been no seizures . She does reports that her thinking is slower . She has seen vascular surgery who felt right lower extremity Raynaud's was benign seeing good pulse being precipitated by cold weather not needing arterial dopplers  . Significant medications aspirin 81 mg po qd , fioircet PRN . Testing  Head CT with extensive bilateral subarachnoid hemorrhage more basilar with Acomm cerebral aneury coiling . CTA head and neck 1.1 x 0.74 cm Acomm aneurysm . Cerebral angiogram improved cerebral vasospasm with moderate to severe left M1 MCA narrowing. Worsening of the left A1 [place] cerebral vasospasm with occlusion from spasm of the right A2 [place] vessel just distal to its origin after the coiled aneurysm mass. Moderate right M1 MCA and basilar/pca arteries cerebral vasospasm . Collateral filling of the right [place] territory from MCAcollaterals (hypoplastic right a1 [place]) and right PCA. MRI of Head subacute evolving [place] territory infarct involving the right gyrus rectus present. Localized mass effect and edema with probable early laminar necrosis. Patchy acute left MCA territory infarcts are present.  These involve the left insular/subinsular region as well as the posterior frontal region. Acute ischemia within the left periatrial white matter. FU Head CT with right frontal hemorrhage at site of prior ventriculostomy tract with bilateral intraventricular hemorrhage . Cerebral angiogram previously coiled A-comm aneurysm is noted with growth at the neck estimated at 5.9 x 2.8 x 3.6 mm. Status post successful Y stent assisted transarterial coil embolization of previously ruptured A-comm artery cerebral aneurysm. FU Head CT status post coil packing anterior circe of Rust . Encephalomalacia right frontal lobe . 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    Ruptured, Coiled 4/14/2017    Arthritis     no per patient, just has RA    Blurry vision, bilateral 05/08/2017    Hematoma of groin 10/17/2017    Right Groin    History of total bilateral knee replacement 2016    Left- 5/2016; Right- 8/2016    Hx of migraine headaches 04/2017    Hypertension 04/2018    Patient in clinical research study 10/31/2017    SMART Registry: Enrolled 10/31/2017;   Completion Date: 05/07/2018    RA (rheumatoid arthritis) ([address] 2011    Knees, Wrists, Hands    Seizure ([address] 04/2017    NONE SINCE    Tingling in extremities 04/14/2017    Right Extremities    Wears glasses 2007    READING       Past Surgical History:   Procedure Laterality Date    CRANIOTOMY Right 4/16/2017    RIGHT FRONTAL EXTERNAL VENTRICULAR DRAIN PLACEMENT performed by Rebeca Brandon MD at [address] Right 2016    KNEE    JOINT REPLACEMENT Left 2016    KNEE    OTHER SURGICAL HISTORY  10/31/2017    2 Neuroform Stents and multiple Pneumbra Smart Coils in head-3T Safe    OTHER SURGICAL HISTORY  04/14/2017    Many Axium Coils placed-see IR notes-OK 3T    GA EGD PERCUTANEOUS PLACEMENT GASTROSTOMY TUBE N/A 5/3/2017    EGD ESOPHAGOGASTRODUODENOSCOPY PEG TUBE INSERTION- GI UNIT SCHEDULED performed by Vinicio Alfonso MD at [facility] APPROX 2017    TRACHEOSTOMY  2017    REMOVED     TRACHEOTOMY N/A 5/3/2017    TRACHEOTOMY performed by Vinicio Alfonso MD at [address]       Family History   Problem Relation Age of Onset    Cancer Mother         PANCREATIC    Breast Cancer Mother     Other Mother         KIDNEY STONE    Diabetes Mother     COPD Father     Diabetes Father        Social History     Socioeconomic History    Marital status:      Spouse name: None    Number of children: None    Years of education: None    Highest education level: None   Occupational History    None   Social Needs    Financial resource strain: None    Food insecurity:     Worry: None     Inability: None    Transportation needs:     Medical: None     Non-medical: None   Tobacco Use    Smoking status: Former Smoker     Last attempt to quit: 10/12/2015     Years since quittin.3    Smokeless tobacco: Never Used   Substance and Sexual Activity    Alcohol use: No    Drug use: No    Sexual activity: None   Lifestyle    Physical activity:     Days per week: None     Minutes per session: None    Stress: None   Relationships    Social connections:     Talks on phone: None     Gets together: None     Attends [employer] service: None     Active member of club or organization: None     Attends meetings of clubs or organizations: None     Relationship status: None    Intimate partner violence:     Fear of current or ex partner: None     Emotionally abused: None     Physically abused: None     Forced sexual activity: None   Other Topics Concern    None   Social History Narrative    None       Current Outpatient Medications   Medication Sig Dispense Refill    lisinopril-hydroCHLOROthiazide (PRINZIDE;ZESTORETIC) 20-25 MG per tablet Take 1 tablet by mouth daily      butalbital-acetaminophen-caffeine (FIORICET, ESGIC) -40 MG per tablet Take 1 po q 8 hours PRN 90 tablet 1    aspirin 81 MG tablet Take 81 mg by mouth daily      vitamin C (ASCORBIC ACID) 500 MG tablet Take 1,000 mg by mouth daily      butalbital-acetaminophen-caffeine (FIORICET, ESGIC) -40 MG per tablet Take 1 tablet by mouth every 4 hours as needed for Headaches      leflunomide (ARAVA) 20 MG tablet Take 1 tablet by mouth daily      calcium carbonate-vitamin D (CALTRATE) 600-400 MG-UNIT TABS per tab Take 1 tablet by mouth 2 times daily      lisinopril (PRINIVIL;ZESTRIL) 10 MG tablet Take 10 mg by mouth daily      Omega-3 Fatty Acids (FISH OIL) 1000 MG CAPS Take 3,000 mg by mouth 3 times daily      Probiotic Product (PROBIOTIC DAILY PO) Take 1 tablet by mouth daily      ferrous sulfate 325 (65 FE) MG tablet Take 325 mg by mouth daily (with breakfast)      Multiple Vitamins-Minerals (THERAPEUTIC MULTIVITAMIN-MINERALS) tablet Take 1 tablet by mouth daily       No current facility-administered medications for this visit. Allergies   Allergen Reactions    Latex Itching and Rash    Ibuprofen Nausea Only    Pcn [Penicillins] Anaphylaxis    Penicillin G Anaphylaxis    Morphine Hives    Tape [Adhesive Tape] Rash       Review of Systems   Constitutional: Positive for fatigue. HENT: Negative. Eyes: Positive for visual disturbance. Respiratory: Negative. Cardiovascular: Negative. Endocrine: Negative. Genitourinary: Negative. Musculoskeletal: Positive for arthralgias, myalgias and neck pain. Skin: Negative. Allergic/Immunologic: Negative. Neurological: Positive for dizziness, weakness, numbness and headaches. Hematological: Negative. Psychiatric/Behavioral: Negative.         Objective:   Physical Exam    Vitals:    02/20/20 0843   BP: (!) 173/93   Pulse: 73     weight: 139 lb 12.8 oz (63.4 kg)    Neurological Examination  Constitutional .General exam well groomed   Ears /Nose/Throat: external ear . Normal exam  Neck and thyroid . Normal size  Cardiovascular: Auscultation of heart with regular rate and rhythm  Musculoskeletal. Muscle bulk and tone normal                                                           Muscle strength right arm 4+/5  right leg 4/5 with giveway otherwise 5/5 strength                                                                                 No dysmetria or dysdiadokinesis  No tremor   Mild decrease rig fine motor  Gait right lower extremity circumduction   Orientation Alert and oriented x 3 . WORLD able to spell forwards and back wards . Serial 7 to 93  . Short term tresa 3 words out of 3 in one minute   Attention and concentration normal   Language process and speech normal . No aphasia   Cranial nerve 2 normal acuety and visual fields  Cranial nerve 3, 4 and 6 . Extraocular muscles are intact . Pupils are equal and reactive   Cranial nerve 5 . Normal strength of masseter and temporalis . Intact corneal reflex. Decrease right facial sensation  Cranial nerve 7 normal exam   Cranial nerve 8. Grossly intact hearing   Cranial nerve 9 and 10. Symmetric palate elevation   Cranial nerve 11 , 5 out of 5 strength   Cranial Nerve 12 midline tongue . No atrophy  Sensation . Decrease pinprick and light touch right arm and leg  Deep Tendon Reflexes brisker on right  Plantar response extensor right     Assessment:      1. Aneurysm of anterior communicating artery    2. Cerebrovascular accident (CVA), unspecified mechanism ([address]    3. Subarachnoid hemorrhage ([address]    4.  Migraine without aura and without status migrainosus, not intractable    Patient is doing well to continue current regimen      Plan:      As above

## 2020-08-18 RX ORDER — BUTALBITAL, ACETAMINOPHEN AND CAFFEINE 50; 325; 40 MG/1; MG/1; MG/1
TABLET ORAL
Qty: 90 TABLET | Refills: 1 | Status: SHIPPED | OUTPATIENT
Start: 2020-08-18 | End: 2021-12-29

## 2020-08-24 ENCOUNTER — TELEPHONE (OUTPATIENT)
Dept: NEUROLOGY | Age: 60
End: 2020-08-24

## 2020-08-24 NOTE — TELEPHONE ENCOUNTER
----- Message from Neena Wagner MD sent at 8/9/2020 11:14 AM EDT -----  Regarding: reschedule appt  Hi if you can reschedule  next summer 2021 while we see when she feels comfortable to get the mra head prior to visit. I sent her message below    Thank you Viola Oquendo for the update and glad to hear you are doing well! I understand and we will be ready when you feel comfortable to obtain the MRA head testing prior to appt which can be a video visit and no mask. I will at least resschedule for next summer but will await to hear from you if you are able to followup sooner.     Jose C Beauchamp MD

## 2021-03-16 ENCOUNTER — OFFICE VISIT (OUTPATIENT)
Dept: NEUROLOGY | Age: 61
End: 2021-03-16
Payer: MEDICARE

## 2021-03-16 VITALS
WEIGHT: 147 LBS | SYSTOLIC BLOOD PRESSURE: 147 MMHG | BODY MASS INDEX: 25.1 KG/M2 | TEMPERATURE: 97.3 F | HEIGHT: 64 IN | HEART RATE: 77 BPM | DIASTOLIC BLOOD PRESSURE: 85 MMHG

## 2021-03-16 DIAGNOSIS — I63.9 CEREBROVASCULAR ACCIDENT (CVA), UNSPECIFIED MECHANISM (HCC): ICD-10-CM

## 2021-03-16 DIAGNOSIS — G43.009 MIGRAINE WITHOUT AURA AND WITHOUT STATUS MIGRAINOSUS, NOT INTRACTABLE: ICD-10-CM

## 2021-03-16 DIAGNOSIS — I67.1 ANEURYSM OF ANTERIOR COMMUNICATING ARTERY: Primary | ICD-10-CM

## 2021-03-16 DIAGNOSIS — I60.9 SUBARACHNOID HEMORRHAGE (HCC): ICD-10-CM

## 2021-03-16 PROCEDURE — G8484 FLU IMMUNIZE NO ADMIN: HCPCS | Performed by: PSYCHIATRY & NEUROLOGY

## 2021-03-16 PROCEDURE — G8427 DOCREV CUR MEDS BY ELIG CLIN: HCPCS | Performed by: PSYCHIATRY & NEUROLOGY

## 2021-03-16 PROCEDURE — 99214 OFFICE O/P EST MOD 30 MIN: CPT | Performed by: PSYCHIATRY & NEUROLOGY

## 2021-03-16 PROCEDURE — G8419 CALC BMI OUT NRM PARAM NOF/U: HCPCS | Performed by: PSYCHIATRY & NEUROLOGY

## 2021-03-16 PROCEDURE — 3017F COLORECTAL CA SCREEN DOC REV: CPT | Performed by: PSYCHIATRY & NEUROLOGY

## 2021-03-16 PROCEDURE — 1036F TOBACCO NON-USER: CPT | Performed by: PSYCHIATRY & NEUROLOGY

## 2021-03-16 RX ORDER — TOFACITINIB 5 MG/1
5 TABLET, FILM COATED ORAL 2 TIMES DAILY
COMMUNITY
End: 2022-04-06

## 2021-03-16 ASSESSMENT — ENCOUNTER SYMPTOMS
ALLERGIC/IMMUNOLOGIC NEGATIVE: 1
RESPIRATORY NEGATIVE: 1
EYES NEGATIVE: 1

## 2021-03-16 NOTE — PROGRESS NOTES
Subjective:      Patient ID: Juanis Lucas is a 64 y.o. female. 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 She had later another Y stent assisted embolization after recanalization of AComm artery aneurysm was discovered. Raynaud's of right leg with right foot discoloration . The condition is she reports ongoing numbness of right arm and right leg with no weakness with mild postural instability with rapid movement on her feet. There will be headaches twice per month over right frontal head grade 5 over 10 attenuated with fioricet . Raynaud's has improved . There have been no seizures . Significant medications aspirin 81 mg po qd , fioircet PRN . Testing  Head CT with extensive bilateral subarachnoid hemorrhage more basilar with Acomm cerebral aneury coiling . CTA head and neck 1.1 x 0.74 cm Acomm aneurysm . Cerebral angiogram improved cerebral vasospasm with moderate to severe left M1 MCA narrowing. Worsening of the left A1 [place] cerebral vasospasm with occlusion from spasm of the right A2 [place] vessel just distal to its origin after the coiled aneurysm mass. Moderate right M1 MCA and basilar/pca arteries cerebral vasospasm . Collateral filling of the right [place] territory from MCAcollaterals (hypoplastic right a1 [place]) and right PCA. MRI of Head subacute evolving [place] territory infarct involving the right gyrus rectus present. Localized mass effect and edema with probable early laminar necrosis. Patchy acute left MCA territory infarcts are present. These involve the left insular/subinsular region as well as the posterior frontal region. Acute ischemia within the left periatrial white matter. FU Head CT with right frontal hemorrhage at site of prior ventriculostomy tract with bilateral intraventricular hemorrhage .  Cerebral angiogram previously coiled A-comm aneurysm is noted with growth at the neck estimated at 5.9 x 2.8 x 3.6 mm. Status post successful Y stent assisted transarterial coil embolization of previously ruptured A-comm artery cerebral aneurysm. FU Head CT status post coil packing anterior circe of Rust . Encephalomalacia right frontal lobe . 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    Ruptured, Coiled 4/14/2017    Arthritis     no per patient, just has RA    Blurry vision, bilateral 05/08/2017    Hematoma of groin 10/17/2017    Right Groin    History of total bilateral knee replacement 2016    Left- 5/2016; Right- 8/2016    Hx of migraine headaches 04/2017    Hypertension 04/2018    Patient in clinical research study 10/31/2017    SMART Registry: Enrolled 10/31/2017;   Completion Date: 05/07/2018    RA (rheumatoid arthritis) ([address] 2011    Knees, Wrists, Hands    Seizure ([address] 04/2017    NONE SINCE    Tingling in extremities 04/14/2017    Right Extremities    Wears glasses 2007    READING       Past Surgical History:   Procedure Laterality Date    CRANIOTOMY Right 4/16/2017    RIGHT FRONTAL EXTERNAL VENTRICULAR DRAIN PLACEMENT performed by Shon Valdes MD at [facility] Right 2016    KNEE    JOINT REPLACEMENT Left 2016    KNEE    OTHER SURGICAL HISTORY  10/31/2017    2 Neuroform Stents and multiple Pneumbra Smart Coils in head-3T Safe    OTHER SURGICAL HISTORY  04/14/2017    Many Axium Coils placed-see IR notes-OK 3T    [place] EGD PERCUTANEOUS PLACEMENT GASTROSTOMY TUBE N/A 5/3/2017    EGD ESOPHAGOGASTRODUODENOSCOPY PEG TUBE INSERTION- GI UNIT SCHEDULED performed by Rasheed Arizmendi MD at [address] AP[facility] 07/2017    TRACHEOSTOMY  06/2017    REMOVED     TRACHEOTOMY N/A 5/3/2017    TRACHEOTOMY performed by Yajaira Cortez MD at [facility]       Family History   Problem Relation Age of Onset    Cancer Mother         PANCREATIC    Breast Cancer Mother     Other Mother         KIDNEY STONE    Diabetes Mother     COPD Father     Diabetes Father        Social History     Socioeconomic History    Marital status:      Spouse name: None    Number of children: None    Years of education: None    Highest education level: None   Occupational History    None   Social Needs    Financial resource strain: None    Food insecurity     Worry: None     Inability: None    Transportation needs     Medical: None     Non-medical: None   Tobacco Use    Smoking status: Former Smoker     Quit date: 10/12/2015     Years since quittin.4    Smokeless tobacco: Never Used   Substance and Sexual Activity    Alcohol use: No    Drug use: No    Sexual activity: Yes     Partners: Male   Lifestyle    Physical activity     Days per week: None     Minutes per session: None    Stress: None   Relationships    Social connections     Talks on phone: None     Gets together: None     Attends Alevism service: None     Active member of club or organization: None     Attends meetings of clubs or organizations: None     Relationship status: None    Intimate partner violence     Fear of current or ex partner: None     Emotionally abused: None     Physically abused: None     Forced sexual activity: None   Other Topics Concern    None   Social History Narrative    None       Current Outpatient Medications   Medication Sig Dispense Refill    Tofacitinib Citrate (XELJANZ) 5 MG TABS Take 5 mg by mouth 2 times daily      aspirin 81 MG tablet Take 81 mg by mouth daily      vitamin C (ASCORBIC ACID) 500 MG tablet Take 1,000 mg by mouth daily      Omega-3 Fatty Acids (FISH OIL) 1000 MG CAPS Take 3,000 mg by mouth 3 times daily      Probiotic Product (PROBIOTIC DAILY PO) Take 1 tablet by mouth daily      Multiple Vitamins-Minerals (THERAPEUTIC MULTIVITAMIN-MINERALS) tablet Take 1 tablet by mouth daily      butalbital-acetaminophen-caffeine (FIORICET, ESGIC) -40 MG per tablet Take 1 po q 8 hours PRN (Patient not taking: Reported on 3/16/2021) 90 tablet 1    lisinopril-hydroCHLOROthiazide (PRINZIDE;ZESTORETIC) 20-25 MG per tablet Take 1 tablet by mouth daily      lisinopril (PRINIVIL;ZESTRIL) 10 MG tablet Take 10 mg by mouth daily      butalbital-acetaminophen-caffeine (FIORICET, ESGIC) -40 MG per tablet Take 1 tablet by mouth every 4 hours as needed for Headaches      leflunomide (ARAVA) 20 MG tablet Take 1 tablet by mouth daily      calcium carbonate-vitamin D (CALTRATE) 600-400 MG-UNIT TABS per tab Take 1 tablet by mouth 2 times daily      ferrous sulfate 325 (65 FE) MG tablet Take 325 mg by mouth daily (with breakfast)       No current facility-administered medications for this visit. Allergies   Allergen Reactions    Latex Itching and Rash    Ibuprofen Nausea Only    Pcn [Penicillins] Anaphylaxis    Penicillin G Anaphylaxis    Morphine Hives    Tape [Adhesive Tape] Rash       Review of Systems   Constitutional: Negative. HENT: Negative. Eyes: Negative. Respiratory: Negative. Cardiovascular: Negative. Endocrine: Negative. Genitourinary: Negative. Skin: Negative. Allergic/Immunologic: Negative. Neurological: Negative. Hematological: Negative. Psychiatric/Behavioral: Negative. Objective:   Physical Exam  Vitals:    03/16/21 0916   BP: (!) 147/85   Pulse: 77   Temp:      weight: 147 lb (66.7 kg)    Neurological Examination  Constitutional .General exam well groomed   Ears /Nose/Throat: external ear . Normal exam  Neck and thyroid . Normal size  Cardiovascular:  Auscultation of heart with regular rate and rhythm  Musculoskeletal. Muscle bulk and tone normal Muscle strength right arm 4+/5  right leg 4/5 with giveway otherwise 5/5 strength                                                                                 No dysmetria or dysdiadokinesis  No tremor   Mild decrease rig fine motor  Gait right lower extremity circumduction   Orientation Alert and oriented x 3 . WORLD able to spell forwards and back wards . Serial 7 to 93  . Short term tresa 3 words out of 3 in one minute   Attention and concentration normal   Language process and speech normal . No aphasia   Cranial nerve 2 normal acuety and visual fields  Cranial nerve 3, 4 and 6 . Extraocular muscles are intact . Pupils are equal and reactive   Cranial nerve 5 . Normal strength of masseter and temporalis . Intact corneal reflex. Decrease right facial sensation  Cranial nerve 7 normal exam   Cranial nerve 8. Grossly intact hearing   Cranial nerve 9 and 10. Symmetric palate elevation   Cranial nerve 11 , 5 out of 5 strength   Cranial Nerve 12 midline tongue . No atrophy  Sensation . Decrease pinprick and light touch right arm and leg  Deep Tendon Reflexes brisker on right  Plantar response extensor right     Assessment:      1. Aneurysm of anterior communicating artery    2. Cerebrovascular accident (CVA), unspecified mechanism ([address]    3. Subarachnoid hemorrhage ([address]    4.  Migraine without aura and without status migrainosus, not intractable    Patient is doing well to continue current regimen to undergo FU MRA of Head      Plan:      Orders Placed This Encounter   Procedures    MRA HEAD W WO CONTRAST     Standing Status:   Future     Standing Expiration Date:   3/16/2022

## 2021-03-30 ENCOUNTER — HOSPITAL ENCOUNTER (OUTPATIENT)
Dept: MRI IMAGING | Facility: CLINIC | Age: 61
Discharge: HOME | End: 2021-04-01
Payer: MEDICARE

## 2021-03-30 ENCOUNTER — HOSPITAL ENCOUNTER (OUTPATIENT)
Dept: MRI IMAGING | Age: 61
Discharge: HOME | End: 2021-04-01
Payer: MEDICARE

## 2021-03-30 DIAGNOSIS — I60.9 SUBARACHNOID HEMORRHAGE (HCC): ICD-10-CM

## 2021-03-30 DIAGNOSIS — I67.1 ANEURYSM OF ANTERIOR COMMUNICATING ARTERY: ICD-10-CM

## 2021-03-30 PROCEDURE — 70546 MR ANGIOGRAPH HEAD W/O&W/DYE: CPT

## 2021-03-30 PROCEDURE — A9576 INJ PROHANCE MULTIPACK: HCPCS | Performed by: PSYCHIATRY & NEUROLOGY

## 2021-03-30 PROCEDURE — 6360000004 HC RX CONTRAST MEDICATION: Performed by: PSYCHIATRY & NEUROLOGY

## 2021-03-30 RX ADMIN — GADOTERIDOL 13 ML: 279.3 INJECTION, SOLUTION INTRAVENOUS at 11:19

## 2021-12-28 DIAGNOSIS — G43.009 MIGRAINE WITHOUT AURA AND WITHOUT STATUS MIGRAINOSUS, NOT INTRACTABLE: Primary | ICD-10-CM

## 2021-12-29 RX ORDER — BUTALBITAL, ACETAMINOPHEN AND CAFFEINE 50; 325; 40 MG/1; MG/1; MG/1
TABLET ORAL
Qty: 90 TABLET | Refills: 1 | Status: SHIPPED | OUTPATIENT
Start: 2021-12-29

## 2021-12-29 NOTE — TELEPHONE ENCOUNTER
Pharmacy requesting a  refill of: Fioricet -40mg     Medication active on med list yes     Date of last prescription: 08/18/2020  with 1  refills verified on 12/19/2021  verified by EMMA MCKEON     Date of last appointment: 03/16/2021     Next Visit Date:  03/28/2022

## 2022-03-31 ENCOUNTER — HOSPITAL ENCOUNTER (OUTPATIENT)
Dept: MRI IMAGING | Facility: CLINIC | Age: 62
Discharge: HOME | End: 2022-04-02
Payer: MEDICARE

## 2022-03-31 DIAGNOSIS — I67.1 ANTERIOR COMMUNICATING ARTERY ANEURYSM: ICD-10-CM

## 2022-03-31 DIAGNOSIS — Z95.828 MRI-SAFE ENDOVASCULAR ANEURYSM COIL PRESENT: ICD-10-CM

## 2022-03-31 PROCEDURE — 70544 MR ANGIOGRAPHY HEAD W/O DYE: CPT

## 2022-04-06 ENCOUNTER — OFFICE VISIT (OUTPATIENT)
Dept: NEUROLOGY | Age: 62
End: 2022-04-06
Payer: MEDICARE

## 2022-04-06 VITALS
TEMPERATURE: 97.5 F | HEIGHT: 64 IN | OXYGEN SATURATION: 100 % | DIASTOLIC BLOOD PRESSURE: 84 MMHG | HEART RATE: 95 BPM | BODY MASS INDEX: 24.26 KG/M2 | RESPIRATION RATE: 18 BRPM | SYSTOLIC BLOOD PRESSURE: 149 MMHG | WEIGHT: 142.1 LBS

## 2022-04-06 DIAGNOSIS — I67.1 ANTERIOR COMMUNICATING ARTERY ANEURYSM: Primary | ICD-10-CM

## 2022-04-06 DIAGNOSIS — Z95.828 MRI-SAFE ENDOVASCULAR ANEURYSM COIL PRESENT: ICD-10-CM

## 2022-04-06 PROCEDURE — G8427 DOCREV CUR MEDS BY ELIG CLIN: HCPCS | Performed by: PSYCHIATRY & NEUROLOGY

## 2022-04-06 PROCEDURE — 3017F COLORECTAL CA SCREEN DOC REV: CPT | Performed by: PSYCHIATRY & NEUROLOGY

## 2022-04-06 PROCEDURE — G8420 CALC BMI NORM PARAMETERS: HCPCS | Performed by: PSYCHIATRY & NEUROLOGY

## 2022-04-06 PROCEDURE — 1036F TOBACCO NON-USER: CPT | Performed by: PSYCHIATRY & NEUROLOGY

## 2022-04-06 PROCEDURE — 99214 OFFICE O/P EST MOD 30 MIN: CPT | Performed by: PSYCHIATRY & NEUROLOGY

## 2022-04-06 NOTE — PROGRESS NOTES
Endovascular Neurosurgery - [address], [address] [facility], [address] [place] [address]  P: [phone]  F: [phone]      Dear Dr. Pavel Diaz    HPI:     CHANDRA    Edith Umana is a 63 y.o. female  With a history of subarachnoid hemorrhage and ruptured anterior communicating artery aneurysm with initial coil embolization on April 14, 2017. she had successful Y stent assisted coil embolization on October 31, 2017 of the noted recanalization seen on her May 7, 2018 cerebral angiogram demonstrating patency of the stent with possible residual 1.5 x 3 mm aneurysm of the shoulder of the left a2 anterior cerebral artery. No headaches. .  No change in her daily activities. MRA head obtained showing stability in the residual filling at the neck of the aneurysm.   She has ongoing treatment of headaches - following with Dr. Magda Solano -     Allergies   Allergen Reactions    Latex Itching and Rash    Ibuprofen Nausea Only    Pcn [Penicillins] Anaphylaxis    Penicillin G Anaphylaxis    Morphine Hives    Tape Corrinne Gens Tape] Rash     Current Outpatient Medications   Medication Sig Dispense Refill    butalbital-acetaminophen-caffeine (FIORICET, ESGIC) -40 MG per tablet TAKE ONE TABLET BY MOUTH EVERY 8 HOURS AS NEEDED 90 tablet 1    aspirin 81 MG tablet Take 81 mg by mouth daily      vitamin C (ASCORBIC ACID) 500 MG tablet Take 1,000 mg by mouth daily      butalbital-acetaminophen-caffeine (FIORICET, ESGIC) -40 MG per tablet Take 1 tablet by mouth every 4 hours as needed for Headaches      Omega-3 Fatty Acids (FISH OIL) 1000 MG CAPS Take 3,000 mg by mouth 3 times daily      Probiotic Product (PROBIOTIC DAILY PO) Take 1 tablet by mouth daily      leflunomide (ARAVA) 20 MG tablet Take 1 tablet by mouth daily      calcium carbonate-vitamin D (CALTRATE) 600-400 MG-UNIT TABS per tab Take 1 tablet by mouth 2 times daily      ferrous sulfate 325 (65 FE) MG tablet Take 325 mg by mouth daily (with breakfast)      Multiple Vitamins-Minerals (THERAPEUTIC MULTIVITAMIN-MINERALS) tablet Take 1 tablet by mouth daily       No current facility-administered medications for this visit. Past Medical History:   Diagnosis Date    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 ([address] 04/14/2017    QUESTIONABLE RUPTURED ANEURSYM-COMA X 1 MONTH'    Anterior communicating artery aneurysm 04/14/2017    Ruptured, Coiled 4/14/2017    Arthritis     no per patient, just has RA    Blurry vision, bilateral 05/08/2017    Hematoma of groin 10/17/2017    Right Groin    History of total bilateral knee replacement 2016    Left- 5/2016; Right- 8/2016    Hx of migraine headaches 04/2017    Hypertension 04/2018    Patient in clinical research study 10/31/2017    SMART Registry: Enrolled 10/31/2017;   Completion Date: 05/07/2018    RA (rheumatoid arthritis) ([address] 2011    Knees, Wrists, Hands    Seizure ([address] 04/2017    NONE SINCE    Tingling in extremities 04/14/2017    Right Extremities    Wears glasses 2007    READING      Past Surgical History:   Procedure Laterality Date    CRANIOTOMY Right 4/16/2017    RIGHT FRONTAL EXTERNAL VENTRICULAR DRAIN PLACEMENT performed by Keyanna Morrow MD at [address] Right 2016    KNEE    JOINT REPLACEMENT Left 2016    KNEE    OTHER SURGICAL HISTORY  10/31/2017    2 Neuroform Stents and multiple Pneumbra Smart Coils in head-3T Safe    OTHER SURGICAL HISTORY  04/14/2017    Many Axium Coils placed-see IR notes-OK 3T    [place] EGD PERCUTANEOUS PLACEMENT GASTROSTOMY TUBE N/A 5/3/2017    EGD ESOPHAGOGASTRODUODENOSCOPY PEG TUBE INSERTION- GI UNIT SCHEDULED performed by Renu Redding MD at [address] [address] [facility] 07/2017    TRACHEOSTOMY  06/2017    REMOVED     TRACHEOTOMY N/A 5/3/2017    TRACHEOTOMY performed by Renu Redding MD at [address]     Family History   Problem Relation Age of Onset    Cancer Mother         PANCREATIC  Breast Cancer Mother     Other Mother         KIDNEY STONE    Diabetes Mother     COPD Father     Diabetes Father      Social History     Tobacco Use    Smoking status: Former Smoker     Quit date: 10/12/2015     Years since quittin.4    Smokeless tobacco: Never Used   Substance Use Topics    Alcohol use: No        Subjective:     Review of Systems      Objective:     BP (!) 149/84 (Site: Left Upper Arm, Position: Sitting, Cuff Size: Medium Adult)   Pulse 95   Temp 97.5 °F (36.4 °C) (Temporal)   Resp 18   Ht 5' 4\" (1.626 m)   Wt 142 lb 1.6 oz (64.5 kg)   LMP 10/30/2016   SpO2 100%   BMI 24.39 kg/m²   Physical Exam  GEN: Sitting in chair nad  CV: Regular rate and rhythm  Neuro: Alert and oriented x 3 iintact language, attention knowledge  CN: eomi, perrl, v1-v3 intact no facial asymmetry, midline tongue, shoulder shrug  MOTOR 5/5 bue/ble  COORD: no dysmetria  Sensory: intact lt    MRA head 2022. Stable residual filling at neck of aneurysm with patent TASHA stents                    Assessment:        Zenia Keller is a 63 y.o. female who has a history of subarachnoid hemorrhage and ruptured anterior communicating artery aneurysm with initial coil embolization on 2017. she had successful Y stent assisted coil embolization on 2017 of the noted recanalization. She had a cerebral angiogram may 2018 with concern of residual 1x3 mm aneuryusmal filling near the neck. MRA head demonstrating similar appearance since last mra head   Diagnosis Orders   1. Anterior communicating artery aneurysm  MRA HEAD WO CONTRAST   2. MRI-safe endovascular aneurysm coil present  MRA HEAD WO CONTRAST       Recommendations:      Return in about 52 weeks (around 2023) for mra head prior to visit.   Orders Placed This Encounter   Procedures    MRA HEAD WO CONTRAST     Standing Status:   Future     Standing Expiration Date:   10/6/2023     Scheduling Instructions:      2023 followup Order Specific Question:   Reason for exam:     Answer:   mri safe atlas stent and aneurysm coils. followup coil embolization acom recurrence     1. mra head in 18 months around aug 2023 prior to visit  2. normotensive sbp <130  3. Headaches - cont followup with Dr. Juanis Zarate      Established Patient Visit Time: 36 minutes  Time Spent in Counselin minutes  Greater than 50% of the time in this visit was spent counseling and coordinating care of this patient. Discussed use, benefit, and side effects of prescribed medications. Personally reviewed imaging with patients and all questions answered. Pt voiced understanding. Patient agreed with treatment plan. Follow up as directed above. If you have any questions, please do not hesitate to call me.   I look forward to following Re Guerra      Sincerely,        Francisco Espinosa MD, MD  Electronically signed by Francisco Espinosa MD, MD on 2022 at 9:45 AM

## 2022-11-29 DIAGNOSIS — G43.009 MIGRAINE WITHOUT AURA AND WITHOUT STATUS MIGRAINOSUS, NOT INTRACTABLE: ICD-10-CM

## 2022-11-29 RX ORDER — BUTALBITAL, ACETAMINOPHEN AND CAFFEINE 50; 325; 40 MG/1; MG/1; MG/1
TABLET ORAL
Qty: 90 TABLET | Refills: 1 | OUTPATIENT
Start: 2022-11-29

## 2022-11-30 DIAGNOSIS — G43.009 MIGRAINE WITHOUT AURA AND WITHOUT STATUS MIGRAINOSUS, NOT INTRACTABLE: ICD-10-CM

## 2022-12-01 RX ORDER — BUTALBITAL, ACETAMINOPHEN AND CAFFEINE 50; 325; 40 MG/1; MG/1; MG/1
TABLET ORAL
Qty: 90 TABLET | Refills: 1 | OUTPATIENT
Start: 2022-12-01

## 2023-03-14 ENCOUNTER — HOSPITAL ENCOUNTER (OUTPATIENT)
Dept: MRI IMAGING | Age: 63
Discharge: HOME | End: 2023-03-16
Payer: MEDICAID

## 2023-03-14 DIAGNOSIS — I67.1 ANTERIOR COMMUNICATING ARTERY ANEURYSM: ICD-10-CM

## 2023-03-14 DIAGNOSIS — Z95.828 MRI-SAFE ENDOVASCULAR ANEURYSM COIL PRESENT: ICD-10-CM

## 2023-03-14 PROCEDURE — 70544 MR ANGIOGRAPHY HEAD W/O DYE: CPT

## 2023-04-05 ENCOUNTER — OFFICE VISIT (OUTPATIENT)
Dept: NEUROLOGY | Age: 63
End: 2023-04-05
Payer: MEDICAID

## 2023-04-05 VITALS
WEIGHT: 143.2 LBS | OXYGEN SATURATION: 98 % | HEART RATE: 75 BPM | BODY MASS INDEX: 24.45 KG/M2 | HEIGHT: 64 IN | DIASTOLIC BLOOD PRESSURE: 83 MMHG | TEMPERATURE: 97.4 F | SYSTOLIC BLOOD PRESSURE: 141 MMHG

## 2023-04-05 DIAGNOSIS — Z95.828 MRI-SAFE ENDOVASCULAR ANEURYSM COIL PRESENT: ICD-10-CM

## 2023-04-05 DIAGNOSIS — I67.1 ANTERIOR COMMUNICATING ARTERY ANEURYSM: Primary | ICD-10-CM

## 2023-04-05 PROCEDURE — 99215 OFFICE O/P EST HI 40 MIN: CPT | Performed by: PSYCHIATRY & NEUROLOGY

## 2023-04-05 RX ORDER — ASCORBIC ACID 100 MG
TABLET,CHEWABLE ORAL
COMMUNITY

## 2023-04-05 RX ORDER — PANCREATIN/LIPASE/PROTEASE/AMY 325 MG
1 TABLET ORAL PRN
COMMUNITY

## 2023-04-05 NOTE — PROGRESS NOTES
sulfate 325 (65 FE) MG tablet Take 1 tablet by mouth daily (with breakfast)      Multiple Vitamins-Minerals (THERAPEUTIC MULTIVITAMIN-MINERALS) tablet Take 1 tablet by mouth daily      butalbital-acetaminophen-caffeine (FIORICET, ESGIC) -40 MG per tablet TAKE ONE TABLET BY MOUTH EVERY 8 HOURS AS NEEDED 90 tablet 1    butalbital-acetaminophen-caffeine (FIORICET, ESGIC) -40 MG per tablet Take 1 tablet by mouth every 4 hours as needed for Headaches (Patient not taking: Reported on 4/5/2023)       No current facility-administered medications for this visit. Past Medical History:   Diagnosis Date    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 ([facility]) 04/14/2017    QUESTIONABLE RUPTURED ANEURSYM-COMA X 1 MONTH'    Anterior communicating artery aneurysm 04/14/2017    Ruptured, Coiled 4/14/2017    Arthritis     no per patient, just has RA    Blurry vision, bilateral 05/08/2017    Hematoma of groin 10/17/2017    Right Groin    History of total bilateral knee replacement 2016    Left- 5/2016; Right- 8/2016    Hx of migraine headaches 04/2017    Hypertension 04/2018    Patient in clinical research study 10/31/2017    SMART Registry: Enrolled 10/31/2017;   Completion Date: 05/07/2018    RA (rheumatoid arthritis) ([address] 2011    Knees, Wrists, Hands    Seizure ([address] 04/2017    NONE SINCE    Tingling in extremities 04/14/2017    Right Extremities    Wears glasses 2007    READING      Past Surgical History:   Procedure Laterality Date    CRANIOTOMY Right 4/16/2017    RIGHT FRONTAL EXTERNAL VENTRICULAR DRAIN PLACEMENT performed by Zaki Lopez MD at [place] [address] Right 2016    KNEE    JOINT REPLACEMENT Left 2016    KNEE    OTHER SURGICAL HISTORY  10/31/2017    2 Neuroform Stents and multiple Pneumbra Smart Coils in head-3T Safe    OTHER SURGICAL HISTORY  04/14/2017    Many Axium Coils placed-see IR notes-OK 3T    [place] EGD PERCUTANEOUS PLACEMENT GASTROSTOMY TUBE N/A 5/3/2017    EGD

## 2023-05-23 ENCOUNTER — TELEPHONE (OUTPATIENT)
Dept: NEUROLOGY | Age: 63
End: 2023-05-23

## 2024-01-12 ENCOUNTER — HOSPITAL ENCOUNTER (OUTPATIENT)
Age: 64
Setting detail: THERAPIES SERIES
Discharge: HOME | End: 2024-01-12
Payer: MEDICAID

## 2024-01-12 PROCEDURE — 97110 THERAPEUTIC EXERCISES: CPT

## 2024-01-12 PROCEDURE — 97161 PT EVAL LOW COMPLEX 20 MIN: CPT

## 2024-01-12 NOTE — THERAPY EVALUATION
[x] [facility]  Outpatient Rehabilitation &  Therapy  [address] [facility] [address]  P: [phone]  F: [phone]     Physical Therapy Lower Extremity Evaluation    Date:  2024  Patient: Kathryn Rinaldi  : 1960  MRN: [id number]  Physician: history of L knee replacement, OA L knee   Insurance: [facility] MEDICAID - 30 VISIT LIMIT - AUTH REQURED AFTER 30TH VISIT THROUGH [facility]  Medical Diagnosis: history of L knee replacement, OA L knee  Rehab Codes: M25.561, M25.361, R 26.2  Onset date: 23  Next 's appt.: 24    Subjective:   CC: pt c/o constant bilat L knee pain and calf pain w/ L knee weakness and she has to concentrate while walking      HPI: (23) pt was attempting to push L boot w/ R foot, she lost her balance and dislocated her L knee; she went to ER where it was reduced and was placed in a knee immobilizer; she is in an open patellar neoprene brace; pt states she dislocated this TKA 5 years ago also    PMHx: [] Unremarkable [] Diabetes [] HTN  [] Pacemaker   [] MI/Heart Problems [] Cancer [] Arthritis [] Other:              [x] Refer to full medical chart  In EPIC       Comorbidities:   [] Obesity [] Dialysis  [] N/A   [] Asthma/COPD [] Dementia [] Other:   [] Stroke [] Sleep apnea [] Other:   [] Vascular disease [] Rheumatic disease [] Other:     Tests: [] X-Ray: [] MRI:  [] Other:    Medications: [x] Refer to full medical record [] None [] Other:  Allergies:      [x] Refer to full medical record [] None [] Other:    Function:  Hand Dominance  [] Right  [] Left  Patient lives with: spouse   In what type of home [x]  One story   [] Two story   [] Split level   Number of stairs to enter 2   With handrail on the []  Right to enter   [] Left to enter   Bathroom has a []  Tub only  [] Tub/shower combo   [x] Walk in shower    []  Grab bars   Washing machine is on []  Main level   [x] Second level   [] Basement   Employer    Job Status []  Normal duty   [] Light duty

## 2024-01-15 ENCOUNTER — HOSPITAL ENCOUNTER (OUTPATIENT)
Age: 64
Setting detail: THERAPIES SERIES
Discharge: HOME | End: 2024-01-15
Payer: MEDICAID

## 2024-01-15 PROCEDURE — 97112 NEUROMUSCULAR REEDUCATION: CPT

## 2024-01-15 PROCEDURE — 97110 THERAPEUTIC EXERCISES: CPT

## 2024-01-15 NOTE — FLOWSHEET NOTE
[] [facility]   Outpatient Rehabilitation & Therapy  [address] [facility] [address]  P: [phone]  F: [phone]    Physical Therapy Daily Treatment Note      Date:  1/15/2024  Patient Name:  Kathryn Rinaldi    :  1960  MRN: [id number]  Physician: history of L knee replacement, OA L knee                             Insurance: [facility] MEDICAID - 30 VISIT LIMIT - AUTH REQURED AFTER 30TH VISIT [facility]ROUGH [facility]  Medical Diagnosis: history of L knee replacement, OA L knee                   Rehab Codes: M25.561, M25.361, R 26.2  Onset date: 23   Visit# / total visits:   Cancels/No Shows: 0/0    Subjective:  01/15/24   Pain:  [] Yes  [x] No Location: L knee Pain Rating: (0-10 scale) 0/10  Pain altered Tx:  [x] No  [] Yes  Action:  Comments:  pt states she is \"just still all over\" this AM and notes no difficulty w/ HEP    Objective:  Modalities:   Precautions:  Exercises:  Exercise Reps/ Time Weight/ Level Comments   Flex SLR 10 x 5\"       Abd/add SLR        Glut set std        LAQ 10 x 5\"       Heel raises 20 x B        L heel raise 8 x       Nu Step S:9 L:1 x 5 mins     Squats at stairs 10 x 2     Close foot balance 30\"  Eyes open and eyes closed    tandem balance 30\" x 2 L foot bias        step up 4\" x 15        lat step up 4\" x 15                                                     Other:  HEP as below:     Access Code: [email or web address]  URL: [email or web address]  Date: 2024  Prepared by: Yunier Villafuerte     Exercises  - Heel Raises with Counter Support  - 2 x daily - 7 x weekly - 1 sets - 20 reps  - Sidelying Hip Abduction  - 2 x daily - 7 x weekly - 1 sets - 10 reps - 5 seconds hold  - Supine Active Straight Leg Raise  - 2 x daily - 7 x weekly - 1 sets - 10 reps - 5 seconds hold  - Sidelying Hip Adduction  - 2 x daily - 7 x weekly - 1 sets - 10 reps - seconds hold     Specific Instructions for next treatment:         Assessment: [x] Progressing toward goals. 01/15/24 pt

## 2024-01-18 ENCOUNTER — HOSPITAL ENCOUNTER (OUTPATIENT)
Age: 64
Setting detail: THERAPIES SERIES
Discharge: HOME | End: 2024-01-18
Payer: MEDICAID

## 2024-01-18 PROCEDURE — 97112 NEUROMUSCULAR REEDUCATION: CPT

## 2024-01-18 PROCEDURE — 97110 THERAPEUTIC EXERCISES: CPT

## 2024-01-18 NOTE — FLOWSHEET NOTE
Other:    [] Patient would continue to benefit from skilled physical therapy services in order to: improve squatting, walking and transfers  GOALS:     STG: (to be met in 10 treatments)  Pt compliant w/ HEP to improve strength and stability of L knee to allow for improved gait tolerance  2.   Pt to note improved confidence w/ gait        LTG: (to be met in 20 treatments)  Improve WOMAC score from 28% impaired  Improve L quad strength to 4/5 so pt can squat w/o pain or instability  Pt able to amb in store 15 mins w/o holding onto cart and w/o feeling of instability in L knee  Pt able to rise to-and-from sitting w/o feeling of instability in L knee                    Patient goals: strengthen legs       Pt. Education:  [x] Yes  [] No  [] Reviewed Prior HEP/Ed  Method of Education: [x] Verbal  [x] Demo  [] Written  Comprehension of Education:  [x] Verbalizes understanding.  [x] Demonstrates understanding.  [] Needs review.  [] Demonstrates/verbalizes HEP/Ed previously given.     Plan: [x] Continue per plan of care.   [] Other:      Treatment Charges: Mins Units   []  Modalities     [x]  Ther Exercise 30 2   []  Manual Therapy     []  Ther Activities     []  Aquatics     [x]  Neuromuscular 10 1   [] Vasocompression     [] Gait Training     [] Dry needling        [] 1 or 2 muscles        [] 3 or more muscles     []  Other     Total BILLABLE  time 40 2     Time In:8:45            Time Out: 9:30    Electronically signed by:  Yunier Villafuerte, PT

## 2024-01-22 ENCOUNTER — HOSPITAL ENCOUNTER (OUTPATIENT)
Age: 64
Setting detail: THERAPIES SERIES
Discharge: HOME | End: 2024-01-22
Payer: MEDICAID

## 2024-01-22 PROCEDURE — 97112 NEUROMUSCULAR REEDUCATION: CPT

## 2024-01-22 PROCEDURE — 97110 THERAPEUTIC EXERCISES: CPT

## 2024-01-22 NOTE — FLOWSHEET NOTE
[x] [facility]   Outpatient Rehabilitation & Therapy   [facility] G  P: [phone]  F: [phone]    Physical Therapy Daily Treatment Note      Date:  2024  Patient Name:  Kathryn Rinaldi    :  1960  MRN: [id number]  Physician: history of L knee replacement, OA L knee                             Insurance: [facility] MEDICAID - 30 VISIT LIMIT - AUTH REQURED AFTER 30TH VISIT THROUGH [facility]  Medical Diagnosis: history of L knee replacement, OA L knee                   Rehab Codes: M25.561, M25.361, R 26.2  Onset date: 23   Visit# / total visits:   Cancels/No Shows: 0/0    Subjective:  24   Pain:  [] Yes  [x] No Location: L knee Pain Rating: (0-10 scale) 2-3/10  Pain altered Tx:  [x] No  [] Yes  Action:  Comments:  pt c/o morning stiffness that feels normal. Reports she wears her knee sleeve / wrap while out of the house.     Objective:  Modalities:   Precautions:  Exercises: performed 24    Exercise Reps/ Time Weight/ Level Comments   Flex SLR 10 x 5\"       Abd/add SLR        Glut set std        LAQ 10 x 5\"    w/o  brace   Heel raises 20 x B        L heel raise 10 x       Nu Step S:9 L:3 x 6 mins     Squats at stairs 10 x 2     Close foot balance 30\" x 2  eyes closed   Close foot balance 60'  x1   Eyes open     tandem balance 30\" x 2 L foot bias        step up 6\" x 15        lat step up 6\" x 15        modified tandem balance L bias  30\"    1 x eyes open, 2 x eyes closed   Sit-to-stand 10 x 2 Fr. 21\" bed W/o brace         Manual : PROM, STM  and gentle knee mobs  5'   To posterior knee                      Other:     Specific Instructions for next treatment: assess pts response to today's workout w/ a couple exercises w/o knee brace and adjust accordkingly         Assessment: [x] Progressing toward goals. 24 Pt tolerated all exercises without discomfort. Occasional \"burning\" sensation in L knee during step ups that pt reported as mild. Gentle

## 2024-01-25 ENCOUNTER — HOSPITAL ENCOUNTER (OUTPATIENT)
Age: 64
Setting detail: THERAPIES SERIES
Discharge: HOME | End: 2024-01-25
Payer: MEDICAID

## 2024-01-25 PROCEDURE — 97110 THERAPEUTIC EXERCISES: CPT

## 2024-01-25 PROCEDURE — 97112 NEUROMUSCULAR REEDUCATION: CPT

## 2024-01-25 NOTE — FLOWSHEET NOTE
[x] [facility]   Outpatient Rehabilitation & Therapy  [address] [facility] [address]  P: [phone]  F: [phone]    Physical Therapy Daily Treatment Note      Date:  2024  Patient Name:  Kathryn Rinaldi    :  1960  MRN: [id number]  Physician: history of L knee replacement, OA L knee                             Insurance: [facility] MEDICAID - 30 VISIT LIMIT - AUTH REQURED AFTER 30TH VISIT THROUGH [facility]  Medical Diagnosis: history of L knee replacement, OA L knee                   Rehab Codes: M25.561, M25.361, R 26.2  Onset date: 23   Visit# / total visits:   Cancels/No Shows: 0/0    Subjective:  24   Pain:  [] Yes  [x] No Location: L knee Pain Rating: (0-10 scale) /10  Pain altered Tx:  [x] No  [] Yes  Action:  Comments:  pt c/o stiffness in her L knee this AM; she walked around in house w/o her knee support yesterday w/o giving way of knee but she did note some swelling at the end of the day    Objective:  Modalities:   Precautions:  Exercises: bold performed 24    Exercise Reps/ Time Weight/ Level Comments   Flex SLR 10 x 5\"       Abd/add SLR        Glut set std        LAQ 10 x 5\"    w/o  brace   Heel raises 20 x B        L heel raise 10 x 2       Nu Step S:9 L:3 x 6 mins     Squats at stairs 10 x 2  W/o holding on   Close foot balance 30\" x 2  eyes closed   Close foot balance 60'  x1   Eyes open     tandem balance 30\" x 2 L foot bias        step up 6\" x 15        lat step up 6\" x 15        modified tandem balance L bias  30\"    1 x eyes open, 2 x eyes closed   Sit-to-stand 10 x 2 Fr. 21\" bed W/o brace         Manual : PROM, STM  and gentle knee mobs  5'   To posterior knee    Retro TM 2 mins  Ues fatigued               Other:     Specific Instructions for next treatment: assess pts response to today's workout w/ a couple exercises w/o knee brace and adjust accordkingly         Assessment: [x] Progressing toward goals. 24 pt beginning to walk in house

## 2024-01-30 ENCOUNTER — HOSPITAL ENCOUNTER (OUTPATIENT)
Age: 64
Setting detail: THERAPIES SERIES
Discharge: HOME | End: 2024-01-30
Payer: MEDICAID

## 2024-01-30 NOTE — FLOWSHEET NOTE
[facility] Outpatient Physical Therapy              [address] [place] [address]              Phone: [phone]              Fax: [phone]    Physical Therapy Cancel/No Show note    Date: 2024  Patient: Kathryn Rinaldi  : 1960  MRN: [id number]      Cancels/No Shows to date:     For today's appointment patient:    [x]  Cancelled    [] Rescheduled appointment    [] No-show     Reason given by patient:    []  Patient ill    []  Conflicting appointment    [] No transportation      [] Conflict with work    [x] No reason given    [] Weather related    [] COVID-19    [] Other:      Comments:        [x] Next appointment was confirmed    Electronically signed by: Harvey Ford PTA

## 2024-02-05 ENCOUNTER — HOSPITAL ENCOUNTER (OUTPATIENT)
Age: 64
Setting detail: THERAPIES SERIES
Discharge: HOME | End: 2024-02-05
Payer: MEDICAID

## 2024-02-05 PROCEDURE — 97112 NEUROMUSCULAR REEDUCATION: CPT

## 2024-02-05 PROCEDURE — 97110 THERAPEUTIC EXERCISES: CPT

## 2024-02-05 NOTE — FLOWSHEET NOTE
[x] [facility]   Outpatient Rehabilitation & Therapy  [address] [facility] G  P: [phone]  F: [phone]    Physical Therapy Daily Treatment Note      Date:  2024  Patient Name:  Kathryn Rinaldi    :  1960  MRN: [id number]  Physician: history of L knee replacement, OA L knee                             Insurance: [facility] MEDICAID - 30 VISIT LIMIT - AUTH REQURED AFTER 30TH VISIT THROUGH [facility]  Medical Diagnosis: history of L knee replacement, OA L knee                   Rehab Codes: M25.561, M25.361, R 26.2  Onset date: 23   Visit# / total visits:   Cancels/No Shows: 0/0    Subjective:  24   Pain:  [x] Yes  [] No Location: L knee Pain Rating: (0-10 scale) 0/10  Pain altered Tx:  [x] No  [] Yes  Action:  Comments:  pt has not been wearing her knee brace for 4 days and has not had any giving way of L knee, though she notes her activity has been lower    Objective:  Modalities:   Precautions:  Exercises: bold performed 24    Exercise Reps/ Time Weight/ Level Comments   Flex SLR 10 x 5\"       Abd/add SLR        Glut set std        LAQ 10 x 5\"    w/o  brace   Heel raises 20 x B        L heel raise 10 x 2       Nu Step S:9 L:3 x 6 mins     Squats at stairs 10 x 2  W/o holding on   Close foot balance 30\" x 2  eyes closed   Close foot balance 60'  x1   Eyes open     tandem balance 30\" x 2 L foot bias        step up 6\" x 20        lat step up 6\" x 20             Sit-to-stand 10 x 2 Fr. 21\" bed W/o brace         Manual : PROM, STM  and gentle knee mobs  5'   To posterior knee    Retro TM 2'30\"  fatigued               Other:     Specific Instructions for next treatment: assess pts response to today's workout w/ a couple exercises w/o knee brace and adjust accordkingly         Assessment: [x] Progressing toward goals. 24 pt walking w/o knee brace though she is cautious    [] No change.     [] Other:    [] Patient would continue to benefit from skilled

## 2024-02-08 ENCOUNTER — APPOINTMENT (OUTPATIENT)
Age: 64
End: 2024-02-08
Payer: MEDICAID

## 2024-02-09 ENCOUNTER — HOSPITAL ENCOUNTER (OUTPATIENT)
Age: 64
Setting detail: THERAPIES SERIES
Discharge: HOME | End: 2024-02-09
Payer: MEDICAID

## 2024-02-09 PROCEDURE — 97110 THERAPEUTIC EXERCISES: CPT

## 2024-02-09 NOTE — FLOWSHEET NOTE
Other:    [] Patient would continue to benefit from skilled physical therapy services in order to: improve squatting, walking and transfers    GOALS:     STG: (to be met in 10 treatments)  Pt compliant w/ HEP to improve strength and stability of L knee to allow for improved gait tolerance  2.   Pt to note improved confidence w/ gait -2/5/24 pt walking w/o knee brace though she is still cautious due to fear of instability in knee        LTG: (to be met in 20 treatments)  Improve WOMAC score from 28% impaired  Improve L quad strength to 4/5 so pt can squat w/o pain or instability  2/5/24 - L quads MMT: 4-/5  Pt able to amb in store 15 mins w/o holding onto cart and w/o feeling of instability in L knee 2/5/24 pt states she can walk as long as she wants in a store but still uses a shopping cart for stability  Pt able to rise to-and-from sitting w/o feeling of instability in L knee 2/5/24 - pt denies pain any longer when rising from sitting but she is cautious w/ her knee as she does not yet fully trust it                    Patient goals: strengthen legs       Pt. Education:  [x] Yes  [] No  [] Reviewed Prior HEP/Ed  Method of Education: [x] Verbal  [x] Demo  [] Written  Comprehension of Education:  [x] Verbalizes understanding.  [x] Demonstrates understanding.  [] Needs review.  [] Demonstrates/verbalizes HEP/Ed previously given.     Plan: [x] Continue per plan of care.   [] Other:      Treatment Charges: Mins Units   []  Modalities     [x]  Ther Exercise 30 2   []  Manual Therapy     []  Ther Activities     []  Aquatics     [x]  Neuromuscular 10 1   [] Vasocompression     [] Gait Training     [] Dry needling        [] 1 or 2 muscles        [] 3 or more muscles     []  Other     Total BILLABLE  time 40 3     Time In: 8:45            Time Out: 9:30    Electronically signed by:  Yunier Villafuerte, PT

## 2024-02-14 ENCOUNTER — HOSPITAL ENCOUNTER (OUTPATIENT)
Age: 64
Setting detail: THERAPIES SERIES
Discharge: HOME | End: 2024-02-14
Payer: MEDICAID

## 2024-02-14 PROCEDURE — 97112 NEUROMUSCULAR REEDUCATION: CPT

## 2024-02-14 PROCEDURE — 97110 THERAPEUTIC EXERCISES: CPT

## 2024-02-14 NOTE — FLOWSHEET NOTE
[x] [facility]   Outpatient Rehabilitation & Therapy  [address] [facility] [address]  P: [phone]  F: [phone]    Physical Therapy Daily Treatment Note      Date:  2024  Patient Name:  Kathryn Rinaldi    :  1960  MRN: [id number]  Physician: history of L knee replacement, OA L knee                             Insurance: [facility] MEDICAID - 30 VISIT LIMIT - AUTH REQURED AFTER 30TH VISIT THROUGH [facility]  Medical Diagnosis: history of L knee replacement, OA L knee                   Rehab Codes: M25.561, M25.361, R 26.2  Onset date: 23   Visit# / total visits:   Cancels/No Shows: 0/0    Subjective:  24   Pain:  [] Yes  [x] No Location: L knee Pain Rating: (0-10 scale) 0/10  Pain altered Tx:  [x] No  [] Yes  Action:  Comments:  Pt reports L knee stiffness this am no pain.  Objective:  Modalities:   Precautions:  Exercises: bold performed 24    Exercise Reps/ Time Weight/ Level Comments   Flex SLR 10 x 5\"       Abd/add SLR        Glut set std        LAQ 10 x 5\"    w/o  brace   Heel raises 20 x B        L heel raise 10 x 2       3 way well leg kick 10 x standingon L     Squats at stairs 10 x 2  W/o holding on   Close foot balance 50\" x 2  eyes closed   Close foot balance 60'  x1   Eyes open     tandem balance 60\" x 2 L foot bias        step up 6\" x 20        lat step up 6\" x 20             Sit-to-stand 5 x each fr. 21\", 20\", 19\" bed  W/o brace   Lat walking 15' x 4     Seated Leg on stool SLR  X10   Pt to add to HEP   Seated hip add  10x  ball    Seated B legs on stool IR isos  10x 3\"  faom    Manual : PROM, STM  and gentle knee mobs  5'   To posterior knee    Retro TM 2'30\"  fatigued               Other:     Specific Instructions for next treatment: assess pts response to today's workout w/ a couple exercises w/o knee brace and adjust accordkingly         Assessment: [x] Progressing toward goals. 24 Pt tolerated treatment well without increased discomfort.

## 2024-02-16 ENCOUNTER — HOSPITAL ENCOUNTER (OUTPATIENT)
Age: 64
Setting detail: THERAPIES SERIES
Discharge: HOME | End: 2024-02-16
Payer: MEDICAID

## 2024-02-16 PROCEDURE — 97110 THERAPEUTIC EXERCISES: CPT

## 2024-02-16 PROCEDURE — 97112 NEUROMUSCULAR REEDUCATION: CPT

## 2024-02-16 NOTE — PROGRESS NOTES
[x] [facility]  Outpatient Rehabilitation &  Therapy  [address] [facility] [address]  P: [phone]  F: [phone]     Physical Therapy Progress Note    Date: 2024      Patient: Kathryn Rinaldi  : 1960  MRN: [id number]    Patient Name:  Kathryn Rinaldi               :  1960                       MRN: [id number]  Physician: history of L knee replacement, OA L knee                             Insurance: [facility] MEDICAID - 30 VISIT LIMIT - AUTH REQURED AFTER 30TH VISIT THROUGH [facility]  Medical Diagnosis: history of L knee replacement, OA L knee                   Rehab Codes: M25.561, M25.361, R 26.2  Onset date: 23   Visit# / total visits:                     Cancels/No Shows: 0/0  Date range of services: 24 to 24      Subjective:  Pain:  [] Yes  [x] No  Location: LEs N/A Pain Rating: (0-10 scale) 0/10  Pain altered Tx:  [x] No  [] Yes  Action:    Comments:pt noted L hip and ankle soreness and stiffness after last PT session    Objective:  Modalities:   Precautions:  Exercises: bold performed 24     Exercise Reps/ Time Weight/ Level Comments   Flex SLR 10 x 5\"       Abd/add SLR         Glut set std         LAQ 10 x 5\"    w/o  brace   Heel raises 20 x B        L heel raise 10 x 2       3 way well leg kick 10 x standing on L       Squats at stairs 12 x 2   W/o holding on   Close foot balance 50\" x 2   eyes closed   Close foot balance 60'  x1    Eyes open     tandem balance 60\" x 2 L foot bias        step up 6\" x 20        lat step up 6\" x 20                 Sit-to-stand 5 x each fr. 21\", 20\", 19\" bed   W/o brace   Lat walking 15' x 4       Seated Leg on stool SLR  X10    Pt to add to HEP   Seated hip add  10x  ball     Seated B legs on stool IR isos  10x 3\"  foam     Manual : PROM, STM  and gentle knee mobs  5'    To posterior knee    Retro TM 3'   fatigued                       Other:     Specific Instructions for next treatment: assess pts response to

## 2024-02-20 ENCOUNTER — HOSPITAL ENCOUNTER (OUTPATIENT)
Age: 64
Setting detail: THERAPIES SERIES
Discharge: HOME | End: 2024-02-20
Payer: MEDICAID

## 2024-02-20 PROCEDURE — 97112 NEUROMUSCULAR REEDUCATION: CPT

## 2024-02-20 PROCEDURE — 97110 THERAPEUTIC EXERCISES: CPT

## 2024-02-20 PROCEDURE — 97016 VASOPNEUMATIC DEVICE THERAPY: CPT

## 2024-02-20 NOTE — FLOWSHEET NOTE
[x] [facility]   Outpatient Rehabilitation & Therapy  [address] [facility] [address]  P: [phone]  F: [phone]    Physical Therapy Daily Treatment Note      Date:  2024  Patient Name:  Kathryn Rinaldi    :  1960  MRN: [id number]  Physician: history of L knee replacement, OA L knee                             Insurance: [facility] MEDICAID - 30 VISIT LIMIT - AUTH REQURED AFTER 30TH VISIT THROUGH [facility]  Medical Diagnosis: history of L knee replacement, OA L knee                   Rehab Codes: M25.561, M25.361, R 26.2  Onset date: 23   Visit# / total visits: 10/20  Cancels/No Shows: 0/0    Subjective:  24   Pain:  [] Yes  [x] No Location: L knee Pain Rating: (0-10 scale) 0/10  Pain altered Tx:  [x] No  [] Yes  Action:  Comments:  Pt report she might have overdone it yesterday in her garden working for quite a while. States she was enjoying the nice day and states her ankle is more swollen and stiff.   Objective:  Modalities:   Precautions:  Exercises: bold performed 24    Exercise Reps/ Time Weight/ Level Comments   Flex SLR 10 x 5\"       Abd/add SLR        Glut set std              LAQ 10 x 5\"    w/o  brace   Heel raises 20 x B        L heel raise 10 x 2       3 way well leg kick 10 x standingon L     Squats at stairs 10 x 2  W/o holding on   Close foot balance 50\" x 2  eyes closed   Close foot balance 60'  x1   Eyes open     tandem balance 60\" x 2 L foot bias        step up 6\" x 20        lat step up 6\" x 20       Leg press  25# x30  Bilateral      TKE L LE 20x   Purple    Sit-to-stand 5 x each fr. 21\", 20\", 19\" bed  W/o brace   Lat walking 15' x 4     Seated Leg on stool SLR  X10   Pt to add to HEP   Seated hip add  10x  ball    Seated B legs on stool IR isos  10x 3\"  faom    Manual : PROM, STM  and gentle knee mobs  5'   To posterior knee    Retro TM 2'30\"  fatigued   Fwd TM  3:30   Noted limp after 3 mins.          Vaso to L ankle/foot   L ankle x10'

## 2024-02-23 ENCOUNTER — HOSPITAL ENCOUNTER (OUTPATIENT)
Age: 64
Setting detail: THERAPIES SERIES
Discharge: HOME | End: 2024-02-23
Payer: MEDICAID

## 2024-02-23 PROCEDURE — 97016 VASOPNEUMATIC DEVICE THERAPY: CPT

## 2024-02-23 PROCEDURE — 97110 THERAPEUTIC EXERCISES: CPT

## 2024-02-23 NOTE — FLOWSHEET NOTE
[x] [facility]   Outpatient Rehabilitation & Therapy  [address] [facility] [address]  P: [phone]  F: [phone]    Physical Therapy Daily Treatment Note      Date:  2024  Patient Name:  Kathryn Rinaldi    :  1960  MRN: [id number]  Physician: history of L knee replacement, OA L knee                             Insurance: [facility] MEDICAID - 30 VISIT LIMIT - AUTH REQURED AFTER 30TH VISIT THROUGH [facility]  Medical Diagnosis: history of L knee replacement, OA L knee                   Rehab Codes: M25.561, M25.361, R 26.2  Onset date: 23   Visit# / total visits:   Cancels/No Shows: 0/0    Subjective:  24   Pain:  [x] Yes  [] No Location: lat L hip and L ankle Pain Rating: (0-10 scale) 5/10  Pain altered Tx:  [x] No  [] Yes  Action:  Comments:  pt states she began having L ankle pain and swelling 24 and she still has some pain and swelling in L ankle and L hip pain    Pt only wears her L knee brace when walking in yard/uneven ground    Objective:  Modalities:   Precautions:  Exercises: bold performed 24    Exercise Reps/ Time Weight/ Level Comments   Flex SLR 10 x 5\"       Abd/add SLR        Glut set std              LAQ 10 x 5\"    w/o  brace   Heel raises 20 x B        L heel raise 10 x 2       3 way well leg kick 10 x standingon L     Squats at stairs 10 x 2  W/o holding on   Close foot balance 60\"  eyes closed   Close foot balance 60'  x1   Eyes open     tandem balance 60\" x 2 L foot bias        step up 6\" x 20        lat step up 6\" x 20       Leg press  25# x30  Bilateral      TKE L LE 20x   Purple    Sit-to-stand 5 x each fr. 21\", 20\", 19\" bed  W/o brace   Lat walking 15' x 4     Seated Leg on stool SLR  X10   Pt to add to HEP   Seated hip add  10x  ball    Seated B legs on stool IR isos  10x 3\"  foam    Manual : PROM, STM  and gentle knee mobs  5'   To posterior knee    Retro TM 2'30\"  fatigued   Fwd TM  5 min  Noted limp after 3 mins.          Vaso to L

## 2024-03-05 ENCOUNTER — HOSPITAL ENCOUNTER (OUTPATIENT)
Age: 64
Setting detail: THERAPIES SERIES
Discharge: HOME | End: 2024-03-05
Payer: MEDICAID

## 2024-03-05 PROCEDURE — 97112 NEUROMUSCULAR REEDUCATION: CPT

## 2024-03-05 PROCEDURE — 97110 THERAPEUTIC EXERCISES: CPT

## 2024-03-05 PROCEDURE — 97016 VASOPNEUMATIC DEVICE THERAPY: CPT

## 2024-03-05 NOTE — FLOWSHEET NOTE
Verbalizes understanding.  [x] Demonstrates understanding.  [] Needs review.  [] Demonstrates/verbalizes HEP/Ed previously given.     Access Code: [email or web address]  URL: [email or web address]  Date: 02/14/2024  Prepared by: Harvey Ford    Exercises  - Seated Hip Adduction Isometrics with Ball  - 1 x daily - 7 x weekly - 3 sets - 10 reps  - Standing Marching  - 1 x daily - 7 x weekly - 3 sets - 10 reps    Plan: [x] Continue per plan of care.   [] Other:      Treatment Charges: Mins Units   []  Modalities     [x]  Ther Exercise 33 2   []  Manual Therapy     []  Ther Activities     []  Aquatics     [x]  Neuromuscular 5 1   [] Vasocompression     [] Gait Training     [] Dry needling        [] 1 or 2 muscles        [] 3 or more muscles     [x]  Other VASO 15 1   Total BILLABLE  time 53 4     Time In: 4:36 pm         Time Out: 5:35 pm    Electronically signed by:  Nell Crow, PT

## 2024-03-11 ENCOUNTER — HOSPITAL ENCOUNTER (OUTPATIENT)
Age: 64
Setting detail: THERAPIES SERIES
Discharge: HOME | End: 2024-03-11
Payer: MEDICAID

## 2024-03-11 PROCEDURE — 97140 MANUAL THERAPY 1/> REGIONS: CPT

## 2024-03-11 PROCEDURE — 97110 THERAPEUTIC EXERCISES: CPT

## 2024-03-11 NOTE — FLOWSHEET NOTE
[x] [facility]   Outpatient Rehabilitation & Therapy  [address] [facility] [address]  P: [phone]  F: [phone]    Physical Therapy Daily Treatment Note      Date:  3/11/2024  Patient Name:  Kathryn Rinaldi    :  1960  MRN: [id number]  Physician: history of L knee replacement, OA L knee                             Insurance: [facility] MEDICAID - 30 VISIT LIMIT - AUTH REQURED AFTER 30TH VISIT THROUGH [facility]  Medical Diagnosis: history of L knee replacement, OA L knee                   Rehab Codes: M25.561, M25.361, R 26.2  Onset date: 23   Visit# / total visits:   Cancels/No Shows: 0/0    Subjective:  24   Pain:  [x] Yes  [] No Location: lat L hip and L ankle Pain Rating: (0-10 scale) 2/10   Pain altered Tx:  [x] No  [] Yes      Pt States she worked in the yard for a while and is a little fitigued this pm. Reports she was raking without much pain.     Objective:  Modalities:   Precautions:  Exercises: bold performed 24    Exercise Reps/ Time Weight/ Level Comments   Flex SLR 10 x 5\"       Abd/add SLR        Glut set std              LAQ 10 x 5\"    w/o  brace   Heel raises 20 x B        L heel raise 10 x 2       3 way well leg kick 10 x standing on L     Squats at stairs 10 x 2  holding on   Close foot balance 2'  eyes closed   Close foot balance 60'  x1   Eyes open     tandem balance 60\" x 2 L foot bias        step up 6\" x 20        lat step up 6\" x 20       Leg press  40# x30  Bilateral      TKE L LE 20x   Purple    Step taps 20x shahana 8\"  CGA   Sit-to-stand 10x  From low chair   Lat walking 15' x 4     Seated Leg on stool SLR  X10   Pt to add to HEP   Seated hip add  10x  ball    Seated B legs on stool IR isos  10x 3\"  foam    Manual : PROM, STM  and gentle knee mobs  5'   To posterior knee    Retro TM 2'30\"  fatigued   Fwd TM  5 min           Vaso to L  ankle/foot and L knee   L ankle x15'   L knee x 15'           Manual STM to B gastrocs in sitting.  8 mins.

## 2024-03-13 ENCOUNTER — HOSPITAL ENCOUNTER (OUTPATIENT)
Age: 64
Setting detail: THERAPIES SERIES
Discharge: HOME | End: 2024-03-13
Payer: MEDICAID

## 2024-03-13 PROCEDURE — 97016 VASOPNEUMATIC DEVICE THERAPY: CPT

## 2024-03-13 PROCEDURE — 97110 THERAPEUTIC EXERCISES: CPT

## 2024-03-13 PROCEDURE — 97140 MANUAL THERAPY 1/> REGIONS: CPT

## 2024-03-13 NOTE — FLOWSHEET NOTE
[x] [facility]   Outpatient Rehabilitation & Therapy  [address] [facility] [facility] [address]  P: [phone]  F: [phone]    Physical Therapy Daily Treatment Note      Date:  3/13/2024  Patient Name:  Kathryn Rinaldi    :  1960  MRN: [id number]  Physician: history of L knee replacement, OA L knee                             Insurance: [facility] MEDICAID - 30 VISIT LIMIT - AUTH REQURED AFTER 30TH VISIT THROUGH [facility]  Medical Diagnosis: history of L knee replacement, OA L knee                   Rehab Codes: M25.561, M25.361, R 26.2  Onset date: 23   Visit# / total visits:   Cancels/No Shows: 0/0    Subjective:  24   Pain:  [x] Yes  [] No Location: lat L hip and L ankle Pain Rating: (0-10 scale) 2/10   Pain altered Tx:  [x] No  [] Yes      Pt States she was busy in her yard today and states she not as bad as her previous treatment.     Objective:  Modalities:   Precautions:  Exercises: bold performed 24    Exercise Reps/ Time Weight/ Level Comments   Flex SLR 10 x 5\"       Abd/add SLR        Glut set std              LAQ 10 x 5\"    w/o  brace   Heel raises 20 x B        L heel raise 10 x 2       3 way well leg kick 10 x standing on L     Squats at stairs 10 x 2  holding on   Close foot balance 4' with balance checks   eyes closed   Close foot balance 60'  x1   Eyes open     tandem balance 60\" x 2 L foot bias        step up 6\" x 20        lat step up 6\" x 20       Leg press  40# x30  Bilateral      TKE L LE 20x   Purple    Step taps 20x shahana 8\"  CGA   Sit-to-stand 10x  From low chair   Lat walking 15' x 4     Step through + stretch back and fwd.  15 x ea     Seated Leg on stool SLR  X10   Pt to add to HEP   Seated hip add  10x  ball    Seated B legs on stool IR isos  10x 3\"  foam    Manual : PROM, STM  and gentle knee mobs  5'   To posterior knee    Retro TM 2'30\"  fatigued   Fwd TM  5 min           Vaso to L  ankle/foot and L knee   L ankle x15'   L knee  ice pack

## 2024-03-18 ENCOUNTER — HOSPITAL ENCOUNTER (OUTPATIENT)
Age: 64
Setting detail: THERAPIES SERIES
Discharge: HOME | End: 2024-03-18
Payer: MEDICAID

## 2024-03-18 PROCEDURE — 97110 THERAPEUTIC EXERCISES: CPT

## 2024-03-18 PROCEDURE — 97112 NEUROMUSCULAR REEDUCATION: CPT

## 2024-03-18 NOTE — FLOWSHEET NOTE
[x] [facility]   Outpatient Rehabilitation & Therapy  [address]  P: [phone]  F: [phone]    Physical Therapy Daily Treatment Note      Date:  3/18/2024  Patient Name:  Kathryn iRnaldi    :  1960  MRN: [id number]  Physician: history of L knee replacement, OA L knee                             Insurance: [facility] MEDICAID - 30 VISIT LIMIT - AUTH REQURED AFTER 30TH VISIT THROUGH [facility]  Medical Diagnosis: history of L knee replacement, OA L knee                   Rehab Codes: M25.561, M25.361, R 26.2  Onset date: 23   Visit# / total visits: 15/20  Cancels/No Shows: 0/0    Subjective:  24   Pain:  [] Yes  [x] No Location: L knee Pain Rating: (0-10 scale) 0/10   Pain altered Tx:  [x] No  [] Yes      Pt states the only time she doesn't trust her L Knee  is when she is working in the yard (uneven ground) but it does not give out on her and she does not wear the knee brace    Objective:  Modalities:   Precautions:  Exercises: bold performed 24    Exercise Reps/ Time Weight/ Level Comments   Flex SLR 10 x 5\"       Abd/add SLR        Glut set std              LAQ 10 x 5\"    w/o  brace   Heel raises 20 x B        L heel raise 10 x 2       3 way well leg kick 15 x standing on L     3 way well leg kick L LE on foam 10 x     Squats at stairs 10 x 2  holding on   Close foot balance 4' with balance checks   eyes closed   2 foot rocker board  2 mins each Fwd/bkwd, med/lat                Close foot balance 60'  x1   Eyes open     tandem balance 60\" x 2 L foot bias        step up 6\" x 20        lat step up 6\" x 20       Leg press  40# x30  Bilateral      TKE L LE 20 x 5\"  Purple    Step taps 20x shahana 8\"  CGA   Sit-to-stand 10x  From low chair   Lat walking 15' x 4     Step through + stretch back and fwd.  15 x ea     Seated Leg on stool SLR  X10   Pt to add to HEP   Seated hip add  10x  ball    Seated B legs on stool IR isos  10x 3\"  foam    Manual : PROM, STM  and

## 2024-03-20 ENCOUNTER — HOSPITAL ENCOUNTER (OUTPATIENT)
Age: 64
Setting detail: THERAPIES SERIES
Discharge: HOME | End: 2024-03-20
Payer: MEDICAID

## 2024-03-20 PROCEDURE — 97110 THERAPEUTIC EXERCISES: CPT

## 2024-03-20 PROCEDURE — 97112 NEUROMUSCULAR REEDUCATION: CPT

## 2024-03-20 NOTE — FLOWSHEET NOTE
on stool IR isos  10x 3\"  foam    Manual : PROM, STM  and gentle knee mobs  5'   To posterior knee    Manual towel foot stretch  10\" x 6 ea foot     Retro TM 2'30\"  fatigued   Fwd TM  5 min           Vaso to B LE's   15 mins MOD perssure.            Manual PROM and mobs to L knee 8 mins.      Other:    Specific Instructions for next treatment: assess pts response to today's workout w/ a couple exercises w/o knee brace and adjust accordkingly         Assessment: [x] Progressing toward goals. 03/20/24 Good tolerance to treatment and added activity on rocker board. Also tolerated modified SLS at TM with minimal foot cramping / discomfort. Added manual B foot towel stretch with good tolerance. Ended with Vaso to B LE's for comfort.     [] No change.     [] Other:    [x] Patient would continue to benefit from skilled physical therapy services in order to: improve squatting, walking and transfers    GOALS:      STG: (to be met in 10 treatments)  Pt compliant w/ HEP to improve strength and stability of L knee to allow for improved gait tolerance  2.   Pt to note improved confidence w/ gait -2/5/24 pt walking w/o knee brace though she is still cautious due to fear of instability in knee        LTG: (to be met in 20 treatments)  Improve WOMAC score from 28% impaired 2/16/24 - WOMAC score: 21%  Improve L quad strength to 4/5 so pt can squat w/o pain or instability  2/16/24 - L quads MMT: 4/5  Pt able to amb in store 15 mins w/o holding onto cart and w/o feeling of instability in L knee 3/18/24 - pt able to walk 30 mins now on level surface (I.e. inside a store)  Pt able to rise to-and-from sitting w/o feeling of instability in L knee 2/5/24 - pt denies pain any longer when rising from sitting but she is cautious w/ her knee as she does not yet fully trust it                    Patient goals: strengthen legs       Pt. Education:  [x] Yes  [] No  [] Reviewed Prior HEP/Ed  Method of Education: [x] Verbal  [x] Demo  []

## 2024-03-25 ENCOUNTER — HOSPITAL ENCOUNTER (OUTPATIENT)
Age: 64
Setting detail: THERAPIES SERIES
Discharge: HOME | End: 2024-03-25
Payer: MEDICAID

## 2024-03-25 PROCEDURE — 97112 NEUROMUSCULAR REEDUCATION: CPT

## 2024-03-25 PROCEDURE — 97016 VASOPNEUMATIC DEVICE THERAPY: CPT

## 2024-03-25 PROCEDURE — 97110 THERAPEUTIC EXERCISES: CPT

## 2024-03-25 NOTE — FLOWSHEET NOTE
fwd.  15 x ea     Seated Leg on stool SLR  X10   Pt to add to HEP   Seated hip add  10x  ball    Seated B legs on stool IR isos  10x 3\"  foam    Manual : PROM, STM  and gentle knee mobs  5'   To posterior knee    Manual towel foot stretch  10\" x 6 ea foot     Retro TM 2'30\"  fatigued   Fwd TM  5 min           Vaso to B LE's   15 mins MOD perssure.            Manual PROM and mobs to L knee 8 mins.      Other:    Specific Instructions for next treatment:continue working on stability exercises         Assessment: [x] Progressing toward goals. 03/25/24 pt still notes instability at times in B knees when squatting, walking (especially on uneven ground) and climbing ladders    [] No change.     [] Other:    [x] Patient would continue to benefit from skilled physical therapy services in order to: improve squatting, walking and transfers    GOALS:      STG: (to be met in 10 treatments)  Pt compliant w/ HEP to improve strength and stability of L knee to allow for improved gait tolerance 3/25/24 GOAL MET  2.   Pt to note improved confidence w/ gait -3/25/24 GOAL MET        LTG: (to be met in 20 treatments)  Improve WOMAC score from 28% impaired 2/16/24 - WOMAC score: 21%  Improve L quad strength to 4/5 so pt can squat w/o pain or instability  2/16/24 - L quads MMT: 4/5  Pt able to amb in store 15 mins w/o holding onto cart and w/o feeling of instability in L knee 3/18/24 - pt able to walk 30 mins now on level surface (I.e. inside a store)  Pt able to rise to-and-from sitting w/o feeling of instability in L knee 2/5/24 - pt denies pain any longer when rising from sitting but she is cautious w/ her knee as she does not yet fully trust it                    Patient goals: strengthen legs       Pt. Education:  [x] Yes  [] No  [x] Reviewed Prior HEP/Ed  Method of Education: [x] Verbal  [x] Demo  [] Written  Comprehension of Education:  [x] Verbalizes understanding.  [x] Demonstrates understanding.  [] Needs review.  []

## 2024-03-27 ENCOUNTER — HOSPITAL ENCOUNTER (OUTPATIENT)
Age: 64
Setting detail: THERAPIES SERIES
Discharge: HOME | End: 2024-03-27
Payer: MEDICAID

## 2024-03-27 PROCEDURE — 97110 THERAPEUTIC EXERCISES: CPT

## 2024-03-27 NOTE — FLOWSHEET NOTE
[x] [facility]   Outpatient Rehabilitation & Therapy  [address]  P: [phone]  F: [phone]    Physical Therapy Daily Treatment Note      Date:  3/27/2024  Patient Name:  Kathryn Rinaldi    :  1960  MRN: [id number]  Physician: history of L knee replacement, OA L knee                             Insurance: [facility] MEDICAID - 30 VISIT LIMIT - AUTH REQURED AFTER 30TH VISIT THROUGH [facility]  Medical Diagnosis: history of L knee replacement, OA L knee                   Rehab Codes: M25.561, M25.361, R 26.2  Onset date: 23   Visit# / total visits:   Cancels/No Shows: 0/0    Subjective:  24   Pain:  [x] Yes  [] No Location: B ankles / LE Pain Rating: (0-10 scale) 1-2/10   Pain altered Tx:  [x] No  [] Yes      Comments:  pt reports she is feeling good today. States she took it easy today. States she feels normal dizzy      Objective:  Modalities:   Precautions:  Exercises: bold performed 24    Exercise Reps/ Time Weight/ Level Comments   Flex SLR 10 x 5\"       Abd/add SLR        Glut set std              LAQ 10 x 5\"    w/o  brace   Heel raises 20 x B        L heel raise 10 x 2       3 way well leg kick 15 x standing on L     3 way well leg kick L LE on foam 10 x     Squats at stairs 10 x 2  holding on   Sit>stand from low stool  10 x       Modified tandem balance 2' each with balance checks   eyes opened   2 foot rocker board  2 mins each    Balance w/ hands 1' ea Fwd/bkwd, med/lat      Squats on rocker board 10 x ea in frontal and sagittal planes           Mod SLS  1' x 2  1 foot on TM           Close foot balance 60'  x1   Eyes open     tandem balance 60\" x 2 L foot bias        step up 6\" x 20        lat step up 6\" x 20       Leg press  40# x30  Bilateral      TKE L LE 20 x 5\"  Purple    Step taps 10 x 2 shahana 8\"  CGA   Sit-to-stand 10x  From low chair   Lat walking 15' x 4     Step through + stretch back and fwd.  15 x ea     Seated Leg on stool SLR

## 2024-04-11 ENCOUNTER — HOSPITAL ENCOUNTER (OUTPATIENT)
Age: 64
Setting detail: THERAPIES SERIES
Discharge: HOME | End: 2024-04-11
Payer: MEDICAID

## 2024-04-11 PROCEDURE — 97016 VASOPNEUMATIC DEVICE THERAPY: CPT

## 2024-04-11 PROCEDURE — 97110 THERAPEUTIC EXERCISES: CPT

## 2024-04-11 NOTE — FLOWSHEET NOTE
[x] [facility]   Outpatient Rehabilitation & Therapy  [address]  P: [phone]  F: [phone]    Physical Therapy Daily Treatment Note      Date:  2024  Patient Name:  Kathryn Rinaldi    :  1960  MRN: [id number]  Physician: history of L knee replacement, OA L knee                             Insurance: [facility] MEDICAID - 30 VISIT LIMIT - AUTH REQURED AFTER 30TH VISIT THROUGH [facility]  Medical Diagnosis: history of L knee replacement, OA L knee                   Rehab Codes: M25.561, M25.361, R 26.2  Onset date: 23   Visit# / total visits:   Cancels/No Shows: 0/0    Subjective:  24   Pain:  [x] Yes  [] No Location: Lat L hip Pain Rating: (0-10 scale) 5/10   Pain altered Tx:  [x] No  [] Yes      Comments:  pt states she was  standing on stones yesterday and lifting bags of dirt when she lost her balance and fell against a step landing on her lat L hip and she still has some pain today in lat L hip but is able to walk w/o a limp    Objective:  Modalities:   Precautions:  Exercises: bold performed 24    Exercise Reps/ Time Weight/ Level Comments   Flex SLR 10 x 5\"       Abd/add SLR        Glut set std              LAQ 10 x 5\"    w/o  brace   Heel raises 20 x B        single  heel raise 10 x ea       3 way well leg kick 10 x standing on R     3 way well leg kick L LE on foam 10 x     Squats at stairs 10 x 2  holding on   Sit>stand from low stool  10 x       Modified tandem balance 2' each with balance checks   eyes opened   2 foot rocker board   1 mins each     Fwd/bkwd, med/lat      Squats on rocker board 10 x ea in frontal and sagittal planes           Mod SLS  1' x 2  1 foot on TM           Close foot balance 60'  x1   Eyes open     tandem balance 60\" x 2 L foot bias        step up 6\" x 20        lat step up 6\" x 20       Leg press  40# x30  Bilateral      TKE L LE 20 x 5\"  Purple    Step taps 10 x 12\" R  10 x 6\" L      Sit-to-stand 10x  From

## 2024-04-15 ENCOUNTER — HOSPITAL ENCOUNTER (OUTPATIENT)
Age: 64
Setting detail: THERAPIES SERIES
Discharge: HOME | End: 2024-04-15
Payer: MEDICAID

## 2024-04-15 PROCEDURE — 97110 THERAPEUTIC EXERCISES: CPT

## 2024-04-15 PROCEDURE — 97016 VASOPNEUMATIC DEVICE THERAPY: CPT

## 2024-04-15 NOTE — DISCHARGE SUMMARY
[x] [facility]  Outpatient Rehabilitation &  Therapy  [address]  [address]  P: [phone]  F: [phone]     Physical Therapy Discharge Note    Date: 4/15/2024      Patient: Kathryn Rinaldi  : 1960  MRN: [id number]    Physician: history of L knee replacement, OA L knee                             Insurance: [facility] MEDICAID - 30 VISIT LIMIT - AUTH REQURED AFTER  VISIT THROUGH [facility]  Medical Diagnosis: history of L knee replacement, OA L knee                   Rehab Codes: M25.561, M25.361, R 26.2  Onset date: 23   Visit# / total visits:                   Cancels/No Shows: 00  Date of initial visit: 24                Date of final visit: 24          Discharge Status:     [] Pt recovered from conditions. Treatment goals were met.    [] Pt received maximum benefit. No further therapy indicated at this time.    [] Pt to continue exercise/home instructions independently.    [] Therapy interrupted due to:    [] Pt has 2 or more no shows/cancels, is discontinued per our policy.    [] Pt has completed prescribed number of treatment sessions.    [x] Other: pt satisfied w/ progress and would like to DC PT at this time and continue w/ HEP on her own        Electronically signed by Yunier Villafuerte PT on 4/15/2024 at 12:48 PM      If you have any questions or concerns, please don't hesitate to call.  Thank you for your referral.

## 2024-04-15 NOTE — FLOWSHEET NOTE
[x] [facility]   Outpatient Rehabilitation & Therapy  [address]  P: [phone]  F: [phone]    Physical Therapy Daily Treatment Note      Date:  4/15/2024  Patient Name:  Kathryn Rinaldi    :  1960  MRN: [id number]  Physician: history of L knee replacement, OA L knee                             Insurance: [facility] MEDICAID - 30 VISIT LIMIT - AUTH REQURED AFTER 30TH VISIT THROUGH [facility]  Medical Diagnosis: history of L knee replacement, OA L knee                   Rehab Codes: M25.561, M25.361, R 26.2  Onset date: 23   Visit# / total visits:   Cancels/No Shows: 0/0    Subjective:  04/15/24   Pain:  [x] Yes  [] No Location: Lat L hip Pain Rating: (0-10 scale) 5/10   Pain altered Tx:  [x] No  [] Yes      Comments:  pt was working in garden all weekend and she notes pain from her low back down B Les     Objective:  Modalities:   Precautions:  Exercises: bold performed 04/15/24    Exercise Reps/ Time Weight/ Level Comments   Flex SLR 10 x 5\"       Abd/add SLR        Glut set std              LAQ 10 x 5\"    w/o  brace   Heel raises 20 x B        single  heel raise 10 x ea       3 way well leg kick 10 x standing on R     3 way well leg kick L LE on foam 10 x     Squats at stairs 10 x 2  holding on   Sit>stand from low stool  10 x       Modified tandem balance 2' each with balance checks   eyes opened   2 foot rocker board   1 mins each     Fwd/bkwd, med/lat      Squats on rocker board 10 x ea in frontal and sagittal planes           Mod SLS  1' x 2  1 foot on TM           Close foot balance 60'  x1   Eyes open     tandem balance 60\" x 2 L foot bias        step up 6\" x 20        lat step up 6\" x 20       Leg press  40# x30  Bilateral      TKE L LE 20 x 5\"  Purple    Step taps 10 x 12\" R  10 x 6\" L      Sit-to-stand 10x  From low chair   Lat walking 15' x 4     Step through + stretch back and fwd.  15 x ea     Seated Leg on stool SLR  X10   Pt to add to HEP

## 2024-04-29 ENCOUNTER — APPOINTMENT (OUTPATIENT)
Age: 64
End: 2024-04-29
Payer: MEDICAID

## 2024-09-06 ENCOUNTER — TELEPHONE (OUTPATIENT)
Dept: NEUROLOGY | Age: 64
End: 2024-09-06

## 2024-09-19 ENCOUNTER — HOSPITAL ENCOUNTER (OUTPATIENT)
Dept: MRI IMAGING | Age: 64
Discharge: HOME | End: 2024-09-21
Attending: PSYCHIATRY & NEUROLOGY
Payer: MEDICAID

## 2024-09-19 DIAGNOSIS — Z95.828 MRI-SAFE ENDOVASCULAR ANEURYSM COIL PRESENT: ICD-10-CM

## 2024-09-19 DIAGNOSIS — I67.1 ANTERIOR COMMUNICATING ARTERY ANEURYSM: ICD-10-CM

## 2024-09-19 PROCEDURE — 70544 MR ANGIOGRAPHY HEAD W/O DYE: CPT

## 2024-12-18 ENCOUNTER — OFFICE VISIT (OUTPATIENT)
Dept: NEUROLOGY | Age: 64
End: 2024-12-18
Payer: MEDICAID

## 2024-12-18 VITALS
DIASTOLIC BLOOD PRESSURE: 89 MMHG | HEART RATE: 83 BPM | WEIGHT: 149 LBS | SYSTOLIC BLOOD PRESSURE: 155 MMHG | BODY MASS INDEX: 25.44 KG/M2 | HEIGHT: 64 IN

## 2024-12-18 DIAGNOSIS — I67.1 ANEURYSM OF ANTERIOR COMMUNICATING ARTERY: ICD-10-CM

## 2024-12-18 DIAGNOSIS — R20.2 BILATERAL NUMBNESS AND TINGLING OF ARMS AND LEGS: ICD-10-CM

## 2024-12-18 DIAGNOSIS — M54.2 NECK PAIN: Primary | ICD-10-CM

## 2024-12-18 DIAGNOSIS — Z95.828 MRI-SAFE ENDOVASCULAR ANEURYSM COIL PRESENT: ICD-10-CM

## 2024-12-18 DIAGNOSIS — R20.0 BILATERAL NUMBNESS AND TINGLING OF ARMS AND LEGS: ICD-10-CM

## 2024-12-18 PROBLEM — I60.2: Status: RESOLVED | Noted: 2017-04-14 | Resolved: 2024-12-18

## 2024-12-18 PROBLEM — J96.00 ACUTE RESPIRATORY FAILURE WITHOUT HYPERCAPNIA: Status: RESOLVED | Noted: 2017-04-14 | Resolved: 2024-12-18

## 2024-12-18 PROCEDURE — 99215 OFFICE O/P EST HI 40 MIN: CPT | Performed by: PSYCHIATRY & NEUROLOGY

## 2024-12-18 RX ORDER — GABAPENTIN 300 MG/1
300 CAPSULE ORAL 3 TIMES DAILY
COMMUNITY

## 2024-12-18 RX ORDER — PSYLLIUM HUSK 0.4 G
5 CAPSULE ORAL 4 TIMES DAILY
COMMUNITY

## 2024-12-18 RX ORDER — VALACYCLOVIR HYDROCHLORIDE 1 G/1
TABLET, FILM COATED ORAL
COMMUNITY
Start: 2024-08-02

## 2024-12-18 ASSESSMENT — ENCOUNTER SYMPTOMS
COLOR CHANGE: 0
PHOTOPHOBIA: 0
SHORTNESS OF BREATH: 0
COUGH: 0
NAUSEA: 0
VOICE CHANGE: 0
VOMITING: 0

## 2024-12-18 NOTE — PROGRESS NOTES
Endovascular Neurosurgery - [place] [id number]  [place] [address]  P: [phone]  F: [phone]      Dear Dr. Umaña    HPI:     History of Present Illness  Kathryn Rinaldi is a 64-year-old woman, in office with her , who presents for evaluation of dizziness, bilateral arm paresthesias, and hypertension.    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 A successful Y-stent assisted coiling was performed on 10/31/2017. Her last cerebral angiogram on 05/07/2018 demonstrated a 1.5 x 3 mm residual aneurysm with patency of the stent. Since then, she has had annual MRA of the head, with the most recent ones on 03/14/2023 and 09/19/2024, showing no change in the residual area of filling.    She has been experiencing persistent dizziness for the past year, which is particularly noticeable upon standing and walking. She reports a sensation of imbalance rather than vertigo. She has been managing this symptom with aspirin.    She is not currently taking Fioricet but uses it as needed for migraines.     She is on Rituxan infusions for her rheumatoid arthritis. She recently consulted her rheumatologist regarding the possibility of her infusion therapy contributing to her dizziness, but he advised her to seek further evaluation from a neurologist.    She also experiences numbness on her right side, which she manages with a brace. She reports constant numbness, tingling, and paresthesias in both legs and feet, with the right side being more affected. She describes the sensation as similar to hitting her funny bone when someone touches her. She occasionally experiences weakness in her , resulting in dropping objects. She also reports neck pain but does not believe it warrants surgical intervention. She has been taking gabapentin for sciatica, which also aids her

## 2024-12-27 ENCOUNTER — HOSPITAL ENCOUNTER (OUTPATIENT)
Dept: MRI IMAGING | Age: 64
Discharge: HOME | End: 2024-12-29
Attending: PSYCHIATRY & NEUROLOGY
Payer: MEDICAID

## 2024-12-27 DIAGNOSIS — M54.2 NECK PAIN: ICD-10-CM

## 2024-12-27 DIAGNOSIS — R20.2 BILATERAL NUMBNESS AND TINGLING OF ARMS AND LEGS: ICD-10-CM

## 2024-12-27 DIAGNOSIS — R20.0 BILATERAL NUMBNESS AND TINGLING OF ARMS AND LEGS: ICD-10-CM

## 2024-12-27 PROCEDURE — 72141 MRI NECK SPINE W/O DYE: CPT

## 2025-01-15 ENCOUNTER — TELEPHONE (OUTPATIENT)
Dept: NEUROLOGY | Age: 65
End: 2025-01-15

## 2025-01-29 ENCOUNTER — HOSPITAL ENCOUNTER (OUTPATIENT)
Age: 65
Setting detail: THERAPIES SERIES
Discharge: HOME | End: 2025-01-29
Payer: MEDICAID

## 2025-01-29 PROCEDURE — 97162 PT EVAL MOD COMPLEX 30 MIN: CPT

## 2025-01-29 NOTE — CONSULTS
[x] [facility]  Outpatient Rehabilitation &  Therapy  [address]  [address]  [phone]  F: [phone]     Physical Therapy Spine Evaluation    Date:  2025  Patient: Kathryn Rinaldi  : 1960  MRN: [id number]  Physician: DEON Gale- CNP   Insurance: [facility] Medicaid - 30 VISIT LIMIT - AUTH AFTER 30 VISITS - ** NO E-STIM ALLOWED**  Medical Diagnosis: M54.16 (ICD-10-CM) - Radiculopathy, lumbar region   Rehab Codes: M54.16  Onset Date: 24  Next 's appt.: TBD    Subjective:     CC: pt c/o intermittent R LBP and post hip pain w/ numbness down R LE; pain and numbness worsen w/ sitting 30 mins and it takes 5 hrs in the AM to get the pain and numbness under control    HPI: (24) pt awoke w/ severe weakness in R LE and difficulty walking and LBP; she saw ortho the  week  who diagnosed her w/ a lumbar condition; she has been sent to PT    PMHx: [] Unremarkable [] Diabetes [] HTN  [] Pacemaker   [] MI/Heart Problems [] Cancer [] Arthritis [x] Other: pt gets infusions for RA every 6 months              [x] Refer to full medical chart  In EPIC ;  periodic numbness in B LES since cerebral aneurysm 2017; Bilat TKA, R LUKASZ      Comorbidities:   [] Obesity [] Dialysis  [] N/A   [] Asthma/COPD [] Dementia [x] Other:  multiple joint replacements   [] Stroke [] Sleep apnea [x] Other: history of cerebral aneurysm   [] Vascular disease [] Rheumatic disease [] Other:     Tests: [] X-Ray: [] MRI:  [] Other:    Medications: [x] Refer to full medical record [] None [] Other:  Allergies:      [x] Refer to full medical record [] None [] Other:    Function:  Hand Dominance  [] Right  [] Left  Patient lives with: spouse   In what type of home [x]  One story   [] Two story   [] Split level   Number of stairs to enter 2   With handrail on the []  Right to enter   [] Left to enter   Bathroom has a []  Tub only  [] Tub/shower combo   [x] Walk in shower    []  Grab bars

## 2025-01-31 ENCOUNTER — HOSPITAL ENCOUNTER (OUTPATIENT)
Age: 65
Setting detail: THERAPIES SERIES
Discharge: HOME | End: 2025-01-31
Payer: MEDICAID

## 2025-01-31 PROCEDURE — 97110 THERAPEUTIC EXERCISES: CPT

## 2025-01-31 NOTE — FLOWSHEET NOTE
[facility] Outpatient Physical Therapy   [address], [place] [address]   Phone: [phone]   Fax: [phone]    Physical Therapy Daily Treatment Note      Date:  2025  Patient Name:  Kathryn Rinaldi    :  1960  MRN: [id number]  Physician: DEON Gale- SHEYLA                          Insurance: [facility] Medicaid - 30 VISIT LIMIT - AUTH AFTER 30 VISITS - ** NO E-STIM ALLOWED**  Medical Diagnosis: M54.16 (ICD-10-CM) - Radiculopathy, lumbar region                         Rehab Codes: M54.16  Onset Date: 24             Next 's appt.: TBD       Visit# / total visits:   Cancels/No Shows: 0/0    Precautions: none    Subjective:  Pt reports 3/10 pain today across her LB today. States she has some numbness on the front of her shin today.        Pain:  [x] Yes  [] No Pain Rating: (0-10 scale) 3/10  Location: LB   Pain altered Tx:  [x] No  [] Yes  Action:     Objective:  INTERVENTIONS  Exercise Reps/ Time Weight/ Level Comments               Manual gentle L hip shifts    Decreased R LE pain  25 pt wished to avoid supine.          SEATED      Marching  20x bilat      Sm march  10 x PEACH T-band      Fwd flexion LB stretch  10 x 5\"     Hip add / abd  Ball x 20   PURP x 15      HS stretching  20\" x 3 Bilat     Gentle STM to calf/quad  5 mins            Standing       Standing push-up position   Abdominal  breathing with int ab activation  20 x     Standing T-tube shifting  10x all directions  For abdominal / hip strength    L hip shifts @ wall 5 x      Standing trunk ext @ bed 10 x      Standing glut sets 15 x 5\"                       Other:  HEP:        Pt. Education:  [x] Yes  [] No  [] Reviewed Prior HEP/Ed  Method of Education: [x] Verbal  [x] Demo  [x] Written  Comprehension of Education:  [x] Verbalizes understanding.  [x] Demonstrates understanding.  [] Needs review.  [x] Demonstrates/verbalizes HEP/Ed previously given.  Access Code:  NEPHROLOGY DAILY PROGRESS NOTE     Follow up Reason:  Hyponatremia     SUBJECTIVE:    Sitting comfortably in chair not in acute distress at this time.     OBJECTIVE:    Total Intake/Output:    Intake/Output Summary (Last 24 hours) at 4/20/2020 1009  Last da packet 17 g, 17 g, Oral, Daily PRN  magnesium hydroxide (MILK OF MAGNESIA) 400 MG/5ML suspension 30 mL, 30 mL, Oral, Daily PRN  bisacodyl (DULCOLAX) rectal suppository 10 mg, 10 mg, Rectal, Daily PRN  Fleet Enema (FLEET) 7-19 GM/118ML enema 133 mL, 1 enema by mouth nightly. [DISCONTINUED] methotrexate 2.5 MG Oral Tab, Take 2.5 mg by mouth.  Hold for 2 weeks, resume 4/13  Takes 8 tablets every Saturday but not to take 4/18/2020   [DISCONTINUED] DAPTOmycin 50 mg/mL in Sterile Water for Injection, Inject 6 mL would try to minimize NSAID use (celebrex), which can worsen hyponatremia   - ok to continue fluphenazine, as she is on this chronically   - monitor strict I/Os   - Sodium levels dropped overnight likely from increased water intake. - 4/19/2020:  Will repe

## 2025-02-04 ENCOUNTER — HOSPITAL ENCOUNTER (OUTPATIENT)
Age: 65
Setting detail: THERAPIES SERIES
Discharge: HOME | End: 2025-02-04
Payer: MEDICAID

## 2025-02-04 PROCEDURE — 97110 THERAPEUTIC EXERCISES: CPT

## 2025-02-04 NOTE — FLOWSHEET NOTE
[facility] Outpatient Physical Therapy   [address], [place] [address]   Phone: [phone]   Fax: [phone]    Physical Therapy Daily Treatment Note      Date:  2025  Patient Name:  Kathryn Rinaldi    :  1960  MRN: [id number]  Physician: DEON Gale- SHEYLA                          Insurance: On license of [facility] Medicaid - 30 VISIT LIMIT - AUTH AFTER 30 VISITS - ** NO E-STIM ALLOWED**  Medical Diagnosis: M54.16 (ICD-10-CM) - Radiculopathy, lumbar region                         Rehab Codes: M54.16  Onset Date: 24             Next 's appt.: TBD       Visit# / total visits: 3 / 20  Cancels/No Shows: 0/0    Precautions: none    Subjective:  Pt reports she is muscularly sore in her abdominals.  States no increased back pain or increased numbness or paresthesia since last appointment.  Reports she continues to sleep on her side. Reports he pain is better in her back. Also reports her meds have changed. States continued 3/10 pain today.       Pain:  [x] Yes  [] No Pain Rating: (0-10 scale)  3/10  Location: LB   Pain altered Tx:  [x] No  [] Yes  Action:     Objective:  INTERVENTIONS  Exercise Reps/ Time Weight/ Level Comments               Manual gentle L hip shifts    Decreased R LE pain  25 pt wished to avoid supine.          SEATED      Marching  20x bilat      Sm march  10 x RED hip T-band      Fwd flexion LB stretch  10 x 5\"     Hip add / abd  Ball x 20   PURP x 20     HS stretching  20\" x 3 Bilat     Gentle STM to calf/quad  5 mins            Standing       Standing push-up position   Abdominal  breathing with int ab activation  20 x     Standing T-tube shifting  10x all directions  For abdominal / hip strength    Lat weight shifting  1 min      L hip shifts @ wall 5 x      Standing trunk ext @ bed 10 x      Standing glut sets 15 x 5\"                       Other:  HEP:        Pt. Education:  [x] Yes  [] No  [] Reviewed Prior HEP/Ed  Method of Education: [x]

## 2025-02-07 ENCOUNTER — HOSPITAL ENCOUNTER (OUTPATIENT)
Age: 65
Setting detail: THERAPIES SERIES
Discharge: HOME | End: 2025-02-07
Payer: MEDICAID

## 2025-02-07 PROCEDURE — 97110 THERAPEUTIC EXERCISES: CPT

## 2025-02-07 NOTE — FLOWSHEET NOTE
Other:    [x] Patient would benefit from skilled physical therapy services in order to: improve pain, sitting, and walking tolerance.       GOALS:   STG: (to be met in 10 treatments)        1. Decrease pain to allow pt to perform extension exercises to decrease tension on lumbar nerves        2. Pt able to note improved functional activity tolerance 3 hrs after rising in the AM v.s. 5 hrs        3. Pt compliant w/ use of towel roll lumbar support when sitting to support back and improved sitting tolerance     LTG: (to be met in 20 treatments)  Improve ASAY score from 46% impaired to less than 15% impaired  Pt able to tolerate sitting 30 mins w/o pain limiting her  Pain not waking pt more than 2 x/week  Pt able to walk in house w/o LE pain or numbness limiting her  Pt independent w/ maintenance HEP to prevent return of symptoms        Patient goals:to be able to move more freely       Plan: [x] Continue per plan of care.   [] Other:      Treatment Charges: Mins Units Time In-Time Out   []  Modalities      [x]  Ther Exercise 40 3 1:34 - 2:14   []  Manual Therapy      []  Ther Activities      []  Aquatics      []  Neuromuscular      [] Vasocompression      [] Gait Training      [] Dry needling        [] 1 or 2 muscles        [] 3 or more muscles      []  Other      Total Billable time 45 3      Time In: 1:34 PM            Time Out: 2:15 PM    Electronically signed by:  Harvey Ford PTA

## 2025-02-11 ENCOUNTER — HOSPITAL ENCOUNTER (OUTPATIENT)
Age: 65
Setting detail: THERAPIES SERIES
Discharge: HOME | End: 2025-02-11
Payer: MEDICAID

## 2025-02-11 PROCEDURE — 97110 THERAPEUTIC EXERCISES: CPT

## 2025-02-11 NOTE — FLOWSHEET NOTE
[facility] Outpatient Physical Therapy   [address], [place] [address]   Phone: [phone]   Fax: [phone]    Physical Therapy Daily Treatment Note      Date:  2025  Patient Name:  Kathryn Rinaldi    :  1960  MRN: [id number]  Physician: DEON Gale- SHEYLA                          Insurance: [facility] Medicaid - 30 VISIT LIMIT - AUTH AFTER 30 VISITS - ** NO E-STIM ALLOWED**  Medical Diagnosis: M54.16 (ICD-10-CM) - Radiculopathy, lumbar region                         Rehab Codes: M54.16  Onset Date: 24             Next 's appt.: TBD       Visit# / total visits:   Cancels/No Shows: 0/0    Precautions: none    Subjective:  Pt reports feeling good today. States she continues to feel sore after her exercises. Mostly in her abdominals. Reports she is also pushing her lateral hip shifts to the Left that causes a little pinching sensation in her LB. States she also was able to tolerate laying on her back flat for ~ 5 mins.       Pain:  [x] Yes  [] No Pain Rating: (0-10 scale)  1 /10   Location: LB   Pain altered Tx:  [x] No  [] Yes  Action:     Objective:  INTERVENTIONS  Exercise Reps/ Time Weight/ Level Comments               Manual gentle L hip shifts    Decreased R LE pain           SEATED        20x bilat      Sm march  20x RED hip T-band      Fwd flexion LB stretch  10 x 5\"     Hip add / abd  Ball x 20   PURP x 20     HS stretching  20\" x 3 Bilat     Gentle STM to calf/quad       Manual calf stretch  30\" x 3 ea      Sit<stand  X 8 From std chair           Standing             Standing push-up position   Abdominal  breathing with int ab activation  20 x     Standing T-tube shifting  15 x all directions  For abdominal / hip strength Little discomfort to L    Lat weight shifting  2 mins      A/P weight shifting  2 mins     L hip shifts @ wall 5 x      Standing trunk ext @ bed 10 x      Standing glut sets 15 x 5\"                       Other:

## 2025-02-14 ENCOUNTER — HOSPITAL ENCOUNTER (OUTPATIENT)
Age: 65
Setting detail: THERAPIES SERIES
Discharge: HOME | End: 2025-02-14
Payer: MEDICAID

## 2025-02-14 PROCEDURE — 97110 THERAPEUTIC EXERCISES: CPT

## 2025-02-14 PROCEDURE — 97140 MANUAL THERAPY 1/> REGIONS: CPT

## 2025-02-14 NOTE — FLOWSHEET NOTE
[facility] Outpatient Physical Therapy   [address], [place] [address]   Phone: [phone]   Fax: [phone]    Physical Therapy Daily Treatment Note      Date:  2025  Patient Name:  Kathryn Rinaldi    :  1960  MRN: [id number]  Physician: DEON Gale- SHEYLA                          Insurance: [facility] Medicaid - 30 VISIT LIMIT - AUTH AFTER 30 VISITS - ** NO E-STIM ALLOWED**  Medical Diagnosis: M54.16 (ICD-10-CM) - Radiculopathy, lumbar region                         Rehab Codes: M54.16  Onset Date: 24             Next 's appt.: late March       Visit# / total visits:   Cancels/No Shows: 0/0    Precautions: none    Subjective:  Pt reports she is sore today from doing a lot of walking while running errands all morning. Noted that she made an appointment with \"a back doctor\" to hopefully obtain some imaging and find out some answers regarding her pain. Pt also requested to cut down PT to 1x/week until she sees MD.      Pain:  [x] Yes  [] No Pain Rating: (0-10 scale)  5 /10   Location: [facility]   Pain altered Tx:  [x] No  [] Yes  Action:     Objective:  INTERVENTIONS  Exercise Reps/ Time Weight/ Level Comments               Manual gentle L hip shifts    Decreased R LE pain           SEATED      Marching  20x bilat      Sm march  20x RED hip T-band      Fwd flexion LB stretch  10 x 5\"     Hip add / abd  Ball x 20   PURP x 20     HS stretching  20\" x 3 Bilat     Gentle STM to calf/quad  5' to left calf with pt in hooklying     Gentle lumbar oscillations in hooklying 5'  Decreased pain initially, but slowly increased   Manual calf stretch  30\" x 3 ea      Sit<>stand  X 5 From std chair     90/90 positioning 3'           Standing             Standing push-up position   Abdominal  breathing with int ab activation  20 x     Standing T-tube shifting  15 x all directions  For abdominal / hip strength Little discomfort to L    Lat weight shifting  2 mins      A/P

## 2025-02-19 ENCOUNTER — HOSPITAL ENCOUNTER (OUTPATIENT)
Age: 65
Setting detail: THERAPIES SERIES
Discharge: HOME | End: 2025-02-19
Payer: MEDICAID

## 2025-02-19 PROCEDURE — 97110 THERAPEUTIC EXERCISES: CPT

## 2025-02-19 NOTE — PROGRESS NOTES
[x] [facility]  Outpatient Rehabilitation &  Therapy  [address]  [address]  P: [phone]  F: [phone]     Physical Therapy Progress Note    Date: 2025      Patient: Kathryn Rinaldi  : 1960  MRN: [id number]    Physician: DEON Gale- CNP                          Insurance: [facility] Medicaid - 30 VISIT LIMIT - AUTH AFTER 30 VISITS - ** NO E-STIM ALLOWED**  Medical Diagnosis: M54.16 (ICD-10-CM) - Radiculopathy, lumbar region                         Rehab Codes: M54.16  Onset Date: 24             Next 's appt.:  spinal surgeon late March        Visit# / total visits:                    Cancels/No Shows: 0/0  Date range of services: 25 to 25      Subjective:  Pain:  [x] Yes  [] No  Location: low back and R hip   Pain Rating: (0-10 scale) 2/10  Pain altered Tx:  [x] No  [] Yes  Action:    Comments: pt states her low back pain is lower: she notes numbness in R knee and lower leg to foot    Objective:    Modalities  Precautions: none     INTERVENTIONS  Exercise Reps/ Time Weight/ Level Comments                       Manual gentle L hip shifts     Decreased R LE pain                SEATED           20x bilat        Sm march  20x RED hip T-band        Fwd flexion LB stretch  10 x 5\"       Hip add / abd  Ball x 20   PURP x 20      HS stretching  20\" x 3 Bilat       Gentle STM to calf/quad  5' to left calf with pt in hooklying  NP     Gentle lumbar oscillations in hooklying 5'  attempted supine Decreased pain initially, but slowly increased   Manual calf stretch  30\" x 3 ea        Sit<>stand  X 5 From std chair      90/90 positioning 3'                 Standing                    Standing push-up position   Abdominal  breathing with int ab activation  20 x       Standing T-tube shifting  15 x all directions  For abdominal / hip strength Little discomfort to L    Lat weight shifting  2 mins        A/P weight shifting  2 mins       L hip

## 2025-02-26 ENCOUNTER — HOSPITAL ENCOUNTER (OUTPATIENT)
Age: 65
Setting detail: THERAPIES SERIES
Discharge: HOME | End: 2025-02-26
Payer: MEDICAID

## 2025-02-26 NOTE — FLOWSHEET NOTE
[facility] Outpatient Physical Therapy              [address] [place] [address]              Phone: [phone]              Fax: [phone]    Physical Therapy Cancel/No Show note    Date: 2025  Patient: Kathryn Rinaldi  : 1960  MRN: [id number]      Cancels/No Shows to date:     For today's appointment patient:    [x]  Cancelled    [] Rescheduled appointment    [] No-show     Reason given by patient:    [x]  Patient ill    []  Conflicting appointment    [] No transportation      [] Conflict with work    [] No reason given    [] Weather related    [] COVID-19    [] Other:      Comments:        [] Next appointment was confirmed    Electronically signed by: Harvey Ford PTA

## 2025-03-05 ENCOUNTER — HOSPITAL ENCOUNTER (OUTPATIENT)
Age: 65
Setting detail: THERAPIES SERIES
Discharge: HOME | End: 2025-03-05
Payer: MEDICAID

## 2025-03-05 PROCEDURE — 97110 THERAPEUTIC EXERCISES: CPT

## 2025-03-05 NOTE — FLOWSHEET NOTE
Education:  [x] Verbalizes understanding.  [x] Demonstrates understanding.  [] Needs review.  [] Demonstrates/verbalizes HEP/Ed previously given.     Plan: [] Continue per plan of care.   [] Other:      Treatment Charges: Mins Units   [x]  Modalities MHP 10    [x]  Ther Exercise 25 2   [x]  Manual Therapy 5    []  Ther Activities     []  Aquatics     []  Neuromuscular     [] Vasocompression     [] Gait Training     [] Dry needling        [] 1 or 2 muscles        [] 3 or more muscles     []  Other     Total BILLABLE  time 40 2     Time In:11:05            Time Out: 11:48    Electronically signed by:  Yunier Villafuerte, PT

## (undated) DEVICE — SVMMC HD AND NK PK

## (undated) DEVICE — SUTURE VCRL SZ 2-0 L18IN ABSRB VLT L26MM SH 1/2 CIR J775D

## (undated) DEVICE — BINDER ABD UNIV H9IN WAIST 30-45IN E SFT COT PREM 3 PNL

## (undated) DEVICE — GLOVE ORANGE PI 8   MSG9080

## (undated) DEVICE — 3M™ STERI-STRIP™ REINFORCED ADHESIVE SKIN CLOSURES, R1547, 1/2 IN X 4 IN (12 MM X 100 MM), 6 STRIPS/ENVELOPE: Brand: 3M™ STERI-STRIP™

## (undated) DEVICE — Z CONVERTED USE 2271043 CONTAINER SPEC COLL 4OZ SCR ON LID PEEL PCH

## (undated) DEVICE — PREP SOL PVP IODINE 4%  4 OZ/BTL

## (undated) DEVICE — CONNECTOR TBNG WHT PLAS SUCT STR 5IN1 LTWT W/ M CONN

## (undated) DEVICE — SUTURE PROL SZ 2-0 L30IN NONABSORBABLE BLU L26MM CT-2 1/2 8411H

## (undated) DEVICE — Z DISCONTINUED BY MEDLINE USE 2280062 TUBE TRACH SZ 8 L79MM OD12.2MM ID7.6MM CUF DISP INNR CANN

## (undated) DEVICE — CATHETER EVD L35CM LUMN ID1.5MM DEPTH MRK 3-15CM FOR EXT

## (undated) DEVICE — SUTURE PERMAHAND SZ 2-0 L12X18IN NONABSORBABLE BLK SILK A185H

## (undated) DEVICE — SUTURE ABSORBABLE BRAIDED 3-0 12X18 IN COAT UD VICRYL + VCP110G

## (undated) DEVICE — SUTURE VCRL SZ 3-0 L18IN ABSRB UD W/O NDL POLYGLACTIN 910 J110T

## (undated) DEVICE — GOWN,AURORA,NONREINFORCED,LARGE: Brand: MEDLINE

## (undated) DEVICE — SPONGE: SPECIALTY PEANUT XR 100/CS: Brand: MEDICAL ACTION INDUSTRIES

## (undated) DEVICE — GLOVE,EXAM,NITRL,PF,SELECT,TXT FNGR,M: Brand: MEDLINE

## (undated) DEVICE — GARMENT COMPR STD FOR 17IN CALF UNIF THER FLOTRN

## (undated) DEVICE — SUTURE NONABSORBABLE MONOFILAMENT 3-0 PS-1 18 IN BLK ETHILON 1663H

## (undated) DEVICE — TOWEL SURG W16XL26IN WHT NONFENESTRATED ST 4 PER PK

## (undated) DEVICE — KIT PEG 20FR STD PUL EN ACCS DEV ENDOVIVE

## (undated) DEVICE — Device

## (undated) DEVICE — SUTURE VCRL SZ 2-0 L27IN ABSRB UD L26MM SH 1/2 CIR J417H

## (undated) DEVICE — 3M™ STERI-STRIP™ COMPOUND BENZOIN TINCTURE 40 BAGS/CARTON 4 CARTONS/CASE C1544: Brand: 3M™ STERI-STRIP™

## (undated) DEVICE — Z DISCONTINUED APPLICATOR SURG PREP 0.35OZ 2% CHG 70% ISO ALC W/ HI LT

## (undated) DEVICE — GLOVE ORANGE PI 7   MSG9070

## (undated) DEVICE — SUTURE VCRL SZ 3-0 L27IN ABSRB UD L26MM SH 1/2 CIR J416H

## (undated) DEVICE — GLOVE SURG SZ 65 THK91MIL LTX FREE SYN POLYISOPRENE

## (undated) DEVICE — MEDI-VAC NON-CONDUCTIVE SUCTION TUBING 7MM X 6.1M (20 FT.) L: Brand: CARDINAL HEALTH

## (undated) DEVICE — 3M™ IOBAN™ 2 ANTIMICROBIAL INCISE DRAPE 6650EZ: Brand: IOBAN™ 2

## (undated) DEVICE — ACCUDRAIN® EXTERNAL CSF DRAINAGE SYSTEM WITH ANTI-REFLUX VALVE: Brand: ACCUDRAIN®

## (undated) DEVICE — 3M™ TEGADERM™ TRANSPARENT FILM DRESSING FRAME STYLE, 1626W, 4 IN X 4-3/4 IN (10 CM X 12 CM), 50/CT 4CT/CASE: Brand: 3M™ TEGADERM™

## (undated) DEVICE — SPONGE DRN W4XL4IN RAYON/POLYESTER 6 PLY NONWOVEN PRECUT

## (undated) DEVICE — SOLUTION SURG PREP POV IOD 7.5% 4 OZ

## (undated) DEVICE — PEN: MARKING STD 100/CS: Brand: MEDICAL ACTION INDUSTRIES

## (undated) DEVICE — GLOVE ORANGE PI 7 1/2   MSG9075